# Patient Record
Sex: MALE | Race: WHITE | NOT HISPANIC OR LATINO | URBAN - METROPOLITAN AREA
[De-identification: names, ages, dates, MRNs, and addresses within clinical notes are randomized per-mention and may not be internally consistent; named-entity substitution may affect disease eponyms.]

---

## 2017-09-06 ENCOUNTER — INPATIENT (INPATIENT)
Facility: HOSPITAL | Age: 37
LOS: 4 days | Discharge: HOME | DRG: 439 | End: 2017-09-11
Attending: INTERNAL MEDICINE | Admitting: INTERNAL MEDICINE
Payer: COMMERCIAL

## 2017-09-06 VITALS
WEIGHT: 166.01 LBS | RESPIRATION RATE: 18 BRPM | DIASTOLIC BLOOD PRESSURE: 110 MMHG | HEART RATE: 91 BPM | TEMPERATURE: 98 F | OXYGEN SATURATION: 98 % | SYSTOLIC BLOOD PRESSURE: 162 MMHG

## 2017-09-06 LAB
ALBUMIN SERPL ELPH-MCNC: 5.4 G/DL — HIGH (ref 3.3–5)
ALP SERPL-CCNC: 47 U/L — SIGNIFICANT CHANGE UP (ref 40–120)
ALT FLD-CCNC: 146 U/L RC — HIGH (ref 10–45)
AMYLASE P1 CFR SERPL: 85 U/L — SIGNIFICANT CHANGE UP (ref 25–125)
ANION GAP SERPL CALC-SCNC: 19 MMOL/L — HIGH (ref 5–17)
AST SERPL-CCNC: 94 U/L — HIGH (ref 10–40)
BASOPHILS # BLD AUTO: 0.1 K/UL — SIGNIFICANT CHANGE UP (ref 0–0.2)
BASOPHILS NFR BLD AUTO: 1.3 % — SIGNIFICANT CHANGE UP (ref 0–2)
BILIRUB SERPL-MCNC: 1.5 MG/DL — HIGH (ref 0.2–1.2)
BUN SERPL-MCNC: 18 MG/DL — SIGNIFICANT CHANGE UP (ref 7–23)
CALCIUM SERPL-MCNC: 10.7 MG/DL — HIGH (ref 8.4–10.5)
CHLORIDE SERPL-SCNC: 96 MMOL/L — SIGNIFICANT CHANGE UP (ref 96–108)
CO2 SERPL-SCNC: 27 MMOL/L — SIGNIFICANT CHANGE UP (ref 22–31)
CREAT SERPL-MCNC: 0.79 MG/DL — SIGNIFICANT CHANGE UP (ref 0.5–1.3)
EOSINOPHIL # BLD AUTO: 0.1 K/UL — SIGNIFICANT CHANGE UP (ref 0–0.5)
EOSINOPHIL NFR BLD AUTO: 0.9 % — SIGNIFICANT CHANGE UP (ref 0–6)
ETHANOL SERPL-MCNC: SIGNIFICANT CHANGE UP MG/DL (ref 0–10)
GLUCOSE SERPL-MCNC: 90 MG/DL — SIGNIFICANT CHANGE UP (ref 70–99)
HCT VFR BLD CALC: 52.6 % — HIGH (ref 39–50)
HGB BLD-MCNC: 18.7 G/DL — HIGH (ref 13–17)
INR BLD: 1.12 RATIO — SIGNIFICANT CHANGE UP (ref 0.88–1.16)
LIDOCAIN IGE QN: 197 U/L — HIGH (ref 7–60)
LYMPHOCYTES # BLD AUTO: 1.2 K/UL — SIGNIFICANT CHANGE UP (ref 1–3.3)
LYMPHOCYTES # BLD AUTO: 18.3 % — SIGNIFICANT CHANGE UP (ref 13–44)
MCHC RBC-ENTMCNC: 33.4 PG — SIGNIFICANT CHANGE UP (ref 27–34)
MCHC RBC-ENTMCNC: 35.5 GM/DL — SIGNIFICANT CHANGE UP (ref 32–36)
MCV RBC AUTO: 94.2 FL — SIGNIFICANT CHANGE UP (ref 80–100)
MONOCYTES # BLD AUTO: 0.6 K/UL — SIGNIFICANT CHANGE UP (ref 0–0.9)
MONOCYTES NFR BLD AUTO: 8.9 % — SIGNIFICANT CHANGE UP (ref 2–14)
NEUTROPHILS # BLD AUTO: 4.6 K/UL — SIGNIFICANT CHANGE UP (ref 1.8–7.4)
NEUTROPHILS NFR BLD AUTO: 70.6 % — SIGNIFICANT CHANGE UP (ref 43–77)
PCP SPEC-MCNC: SIGNIFICANT CHANGE UP
PLATELET # BLD AUTO: 189 K/UL — SIGNIFICANT CHANGE UP (ref 150–400)
POTASSIUM SERPL-MCNC: 4.3 MMOL/L — SIGNIFICANT CHANGE UP (ref 3.5–5.3)
POTASSIUM SERPL-SCNC: 4.3 MMOL/L — SIGNIFICANT CHANGE UP (ref 3.5–5.3)
PROT SERPL-MCNC: 8.5 G/DL — HIGH (ref 6–8.3)
PROTHROM AB SERPL-ACNC: 12.2 SEC — SIGNIFICANT CHANGE UP (ref 9.8–12.7)
RBC # BLD: 5.58 M/UL — SIGNIFICANT CHANGE UP (ref 4.2–5.8)
RBC # FLD: 11.3 % — SIGNIFICANT CHANGE UP (ref 10.3–14.5)
SODIUM SERPL-SCNC: 142 MMOL/L — SIGNIFICANT CHANGE UP (ref 135–145)
WBC # BLD: 6.5 K/UL — SIGNIFICANT CHANGE UP (ref 3.8–10.5)
WBC # FLD AUTO: 6.5 K/UL — SIGNIFICANT CHANGE UP (ref 3.8–10.5)

## 2017-09-06 PROCEDURE — 99285 EMERGENCY DEPT VISIT HI MDM: CPT

## 2017-09-06 RX ORDER — HYDROMORPHONE HYDROCHLORIDE 2 MG/ML
2 INJECTION INTRAMUSCULAR; INTRAVENOUS; SUBCUTANEOUS ONCE
Qty: 0 | Refills: 0 | Status: DISCONTINUED | OUTPATIENT
Start: 2017-09-06 | End: 2017-09-07

## 2017-09-06 RX ORDER — SODIUM CHLORIDE 9 MG/ML
1000 INJECTION, SOLUTION INTRAVENOUS ONCE
Qty: 0 | Refills: 0 | Status: COMPLETED | OUTPATIENT
Start: 2017-09-06 | End: 2017-09-06

## 2017-09-06 RX ORDER — HYDROMORPHONE HYDROCHLORIDE 2 MG/ML
2 INJECTION INTRAMUSCULAR; INTRAVENOUS; SUBCUTANEOUS EVERY 4 HOURS
Qty: 0 | Refills: 0 | Status: DISCONTINUED | OUTPATIENT
Start: 2017-09-06 | End: 2017-09-06

## 2017-09-06 RX ORDER — ONDANSETRON 8 MG/1
4 TABLET, FILM COATED ORAL EVERY 4 HOURS
Qty: 0 | Refills: 0 | Status: DISCONTINUED | OUTPATIENT
Start: 2017-09-06 | End: 2017-09-11

## 2017-09-06 RX ORDER — SODIUM CHLORIDE 9 MG/ML
1000 INJECTION, SOLUTION INTRAVENOUS
Qty: 0 | Refills: 0 | Status: DISCONTINUED | OUTPATIENT
Start: 2017-09-06 | End: 2017-09-11

## 2017-09-06 RX ORDER — HYDROMORPHONE HYDROCHLORIDE 2 MG/ML
1 INJECTION INTRAMUSCULAR; INTRAVENOUS; SUBCUTANEOUS ONCE
Qty: 0 | Refills: 0 | Status: DISCONTINUED | OUTPATIENT
Start: 2017-09-06 | End: 2017-09-06

## 2017-09-06 RX ADMIN — HYDROMORPHONE HYDROCHLORIDE 2 MILLIGRAM(S): 2 INJECTION INTRAMUSCULAR; INTRAVENOUS; SUBCUTANEOUS at 23:25

## 2017-09-06 RX ADMIN — ONDANSETRON 4 MILLIGRAM(S): 8 TABLET, FILM COATED ORAL at 23:25

## 2017-09-06 RX ADMIN — SODIUM CHLORIDE 2000 MILLILITER(S): 9 INJECTION, SOLUTION INTRAVENOUS at 23:25

## 2017-09-06 RX ADMIN — SODIUM CHLORIDE 2000 MILLILITER(S): 9 INJECTION, SOLUTION INTRAVENOUS at 23:38

## 2017-09-06 NOTE — ED PROVIDER NOTE - ABDOMINAL EXAM
Was the patient seen in the last year in this department? Yes     Does patient have an active prescription for medications requested? No     Received Request Via: Pharmacy       tender.../soft

## 2017-09-06 NOTE — ED ADULT NURSE NOTE - OBJECTIVE STATEMENT
36 y/o M presents to the ED c/o abdominal pain.  Patient states he has a hx of pancreatitis, and was hospitalized 2 times in May for pancreatitis.  Pt states he is having pain that is the same from May, pt describes it at diffuses abdominal pain that feels like "pressure".  Pt states he drinks "at least a pint of gin per day".  Pt states he was cutting back a little, but since he had off all weekend he drank a lot and that's why he is having the pain again.  Pt also has hx HTN, but has never followed up and takes no medication daily.  Pt endorses + nausea, but denies vomiting.  Pt also mentions he has a lack of appetite.  Patient is A&Ox4. Face is symmetrical. PERRL 3mmB. Speech is clear. Patient is moving all extremities with 5/5 strength and walks with steady gait.

## 2017-09-06 NOTE — ED PROVIDER NOTE - PROGRESS NOTE DETAILS
Chica CORONA: Patient signed out to me by Dr. Hyman. Patient is a 38 yo M with history of etoh pancreatitis p/w similar epigastric pain radiating to the back. Last episode was May 2017 at an outside hospital. + nausea and 1 episode of vomiting yesterday. No fevers. No signs of withdrawal. Last drink was 36 hours ago. Exam significant for ttp in epigastric area. Patient is pending labs/ CT abd/ pelvis.

## 2017-09-06 NOTE — ED ADULT NURSE NOTE - ED STAT RN HANDOFF DETAILS
Report given to LEYDI Summers, Patient is A&Ox4. Face is symmetrical. PERRL 3mmB. Speech is clear. Patient is moving all extremities with 5/5 strength and walks with steady gait.  Pt denies pain.  Patient awaiting med surg bed.

## 2017-09-06 NOTE — ED PROVIDER NOTE - OBJECTIVE STATEMENT
Dr. Hyman Note: 37M with acute ab pain and nausea that feels like his previous pancreatitis, worse with time, better with nothing, last drink 36hrs ago, no feelings for withdrawal.  2 prior episodes like this.  No fever.  Radiating pain to back.

## 2017-09-06 NOTE — ED PROVIDER NOTE - MEDICAL DECISION MAKING DETAILS
Dr. Hyman Note: concern for alcoholic pancreatitis 36 yo M w hx of alcoholism presenting with abdominal pain w radiation to back concerning for alcoholic pancreatitis  will check cbc, cmp, lipase, amylase, utox, start LR bolus, CT A/P   Dr. Hyman Note: concern for alcoholic pancreatitis

## 2017-09-07 DIAGNOSIS — Z98.890 OTHER SPECIFIED POSTPROCEDURAL STATES: Chronic | ICD-10-CM

## 2017-09-07 DIAGNOSIS — I10 ESSENTIAL (PRIMARY) HYPERTENSION: ICD-10-CM

## 2017-09-07 DIAGNOSIS — K85.20 ALCOHOL INDUCED ACUTE PANCREATITIS WITHOUT NECROSIS OR INFECTION: ICD-10-CM

## 2017-09-07 DIAGNOSIS — F10.10 ALCOHOL ABUSE, UNCOMPLICATED: ICD-10-CM

## 2017-09-07 DIAGNOSIS — Z87.09 PERSONAL HISTORY OF OTHER DISEASES OF THE RESPIRATORY SYSTEM: Chronic | ICD-10-CM

## 2017-09-07 LAB
ALBUMIN SERPL ELPH-MCNC: 4.5 G/DL — SIGNIFICANT CHANGE UP (ref 3.3–5)
ALBUMIN SERPL ELPH-MCNC: 4.7 G/DL — SIGNIFICANT CHANGE UP (ref 3.3–5)
ALP SERPL-CCNC: 38 U/L — LOW (ref 40–120)
ALP SERPL-CCNC: 40 U/L — SIGNIFICANT CHANGE UP (ref 40–120)
ALT FLD-CCNC: 115 U/L — HIGH (ref 10–45)
ALT FLD-CCNC: 120 U/L RC — HIGH (ref 10–45)
ANION GAP SERPL CALC-SCNC: 15 MMOL/L — SIGNIFICANT CHANGE UP (ref 5–17)
ANION GAP SERPL CALC-SCNC: 19 MMOL/L — HIGH (ref 5–17)
APAP SERPL-MCNC: <15 UG/ML — SIGNIFICANT CHANGE UP (ref 10–30)
APTT BLD: 34.7 SEC — SIGNIFICANT CHANGE UP (ref 27.5–37.4)
AST SERPL-CCNC: 70 U/L — HIGH (ref 10–40)
AST SERPL-CCNC: 70 U/L — HIGH (ref 10–40)
BILIRUB DIRECT SERPL-MCNC: 0.4 MG/DL — HIGH (ref 0–0.2)
BILIRUB INDIRECT FLD-MCNC: 0.7 MG/DL — SIGNIFICANT CHANGE UP (ref 0.2–1)
BILIRUB SERPL-MCNC: 1.1 MG/DL — SIGNIFICANT CHANGE UP (ref 0.2–1.2)
BILIRUB SERPL-MCNC: 1.1 MG/DL — SIGNIFICANT CHANGE UP (ref 0.2–1.2)
BUN SERPL-MCNC: 13 MG/DL — SIGNIFICANT CHANGE UP (ref 7–23)
BUN SERPL-MCNC: 14 MG/DL — SIGNIFICANT CHANGE UP (ref 7–23)
CALCIUM SERPL-MCNC: 9.7 MG/DL — SIGNIFICANT CHANGE UP (ref 8.4–10.5)
CALCIUM SERPL-MCNC: 9.8 MG/DL — SIGNIFICANT CHANGE UP (ref 8.4–10.5)
CHLORIDE SERPL-SCNC: 95 MMOL/L — LOW (ref 96–108)
CHLORIDE SERPL-SCNC: 98 MMOL/L — SIGNIFICANT CHANGE UP (ref 96–108)
CO2 SERPL-SCNC: 24 MMOL/L — SIGNIFICANT CHANGE UP (ref 22–31)
CO2 SERPL-SCNC: 26 MMOL/L — SIGNIFICANT CHANGE UP (ref 22–31)
CREAT SERPL-MCNC: 0.64 MG/DL — SIGNIFICANT CHANGE UP (ref 0.5–1.3)
CREAT SERPL-MCNC: 0.69 MG/DL — SIGNIFICANT CHANGE UP (ref 0.5–1.3)
GLUCOSE SERPL-MCNC: 94 MG/DL — SIGNIFICANT CHANGE UP (ref 70–99)
GLUCOSE SERPL-MCNC: 97 MG/DL — SIGNIFICANT CHANGE UP (ref 70–99)
HCT VFR BLD CALC: 44 % — SIGNIFICANT CHANGE UP (ref 39–50)
HGB BLD-MCNC: 15.7 G/DL — SIGNIFICANT CHANGE UP (ref 13–17)
LIDOCAIN IGE QN: 246 U/L — HIGH (ref 7–60)
MAGNESIUM SERPL-MCNC: 1.5 MG/DL — LOW (ref 1.6–2.6)
MCHC RBC-ENTMCNC: 31.2 PG — SIGNIFICANT CHANGE UP (ref 27–34)
MCHC RBC-ENTMCNC: 35.7 GM/DL — SIGNIFICANT CHANGE UP (ref 32–36)
MCV RBC AUTO: 87.5 FL — SIGNIFICANT CHANGE UP (ref 80–100)
PHOSPHATE SERPL-MCNC: 3.1 MG/DL — SIGNIFICANT CHANGE UP (ref 2.5–4.5)
PLATELET # BLD AUTO: 160 K/UL — SIGNIFICANT CHANGE UP (ref 150–400)
POTASSIUM SERPL-MCNC: 3.9 MMOL/L — SIGNIFICANT CHANGE UP (ref 3.5–5.3)
POTASSIUM SERPL-MCNC: 4 MMOL/L — SIGNIFICANT CHANGE UP (ref 3.5–5.3)
POTASSIUM SERPL-SCNC: 3.9 MMOL/L — SIGNIFICANT CHANGE UP (ref 3.5–5.3)
POTASSIUM SERPL-SCNC: 4 MMOL/L — SIGNIFICANT CHANGE UP (ref 3.5–5.3)
PROT SERPL-MCNC: 7.1 G/DL — SIGNIFICANT CHANGE UP (ref 6–8.3)
PROT SERPL-MCNC: 7.3 G/DL — SIGNIFICANT CHANGE UP (ref 6–8.3)
RBC # BLD: 5.03 M/UL — SIGNIFICANT CHANGE UP (ref 4.2–5.8)
RBC # FLD: 12.2 % — SIGNIFICANT CHANGE UP (ref 10.3–14.5)
SODIUM SERPL-SCNC: 138 MMOL/L — SIGNIFICANT CHANGE UP (ref 135–145)
SODIUM SERPL-SCNC: 139 MMOL/L — SIGNIFICANT CHANGE UP (ref 135–145)
WBC # BLD: 5.78 K/UL — SIGNIFICANT CHANGE UP (ref 3.8–10.5)
WBC # FLD AUTO: 5.78 K/UL — SIGNIFICANT CHANGE UP (ref 3.8–10.5)

## 2017-09-07 PROCEDURE — 99223 1ST HOSP IP/OBS HIGH 75: CPT

## 2017-09-07 PROCEDURE — 76705 ECHO EXAM OF ABDOMEN: CPT | Mod: 26,RT

## 2017-09-07 PROCEDURE — 74177 CT ABD & PELVIS W/CONTRAST: CPT | Mod: 26

## 2017-09-07 RX ORDER — METOPROLOL TARTRATE 50 MG
25 TABLET ORAL
Qty: 0 | Refills: 0 | Status: DISCONTINUED | OUTPATIENT
Start: 2017-09-07 | End: 2017-09-08

## 2017-09-07 RX ORDER — HYDROMORPHONE HYDROCHLORIDE 2 MG/ML
1 INJECTION INTRAMUSCULAR; INTRAVENOUS; SUBCUTANEOUS
Qty: 0 | Refills: 0 | Status: DISCONTINUED | OUTPATIENT
Start: 2017-09-07 | End: 2017-09-07

## 2017-09-07 RX ORDER — ONDANSETRON 8 MG/1
4 TABLET, FILM COATED ORAL ONCE
Qty: 0 | Refills: 0 | Status: COMPLETED | OUTPATIENT
Start: 2017-09-07 | End: 2017-09-07

## 2017-09-07 RX ORDER — HYDROMORPHONE HYDROCHLORIDE 2 MG/ML
1 INJECTION INTRAMUSCULAR; INTRAVENOUS; SUBCUTANEOUS ONCE
Qty: 0 | Refills: 0 | Status: DISCONTINUED | OUTPATIENT
Start: 2017-09-07 | End: 2017-09-07

## 2017-09-07 RX ORDER — METOPROLOL TARTRATE 50 MG
1 TABLET ORAL
Qty: 0 | Refills: 0 | COMMUNITY

## 2017-09-07 RX ORDER — HYDRALAZINE HCL 50 MG
10 TABLET ORAL ONCE
Qty: 0 | Refills: 0 | Status: COMPLETED | OUTPATIENT
Start: 2017-09-07 | End: 2017-09-07

## 2017-09-07 RX ORDER — MAGNESIUM SULFATE 500 MG/ML
2 VIAL (ML) INJECTION ONCE
Qty: 0 | Refills: 0 | Status: COMPLETED | OUTPATIENT
Start: 2017-09-07 | End: 2017-09-07

## 2017-09-07 RX ORDER — THIAMINE MONONITRATE (VIT B1) 100 MG
100 TABLET ORAL DAILY
Qty: 0 | Refills: 0 | Status: COMPLETED | OUTPATIENT
Start: 2017-09-07 | End: 2017-09-09

## 2017-09-07 RX ORDER — HYDROMORPHONE HYDROCHLORIDE 2 MG/ML
2 INJECTION INTRAMUSCULAR; INTRAVENOUS; SUBCUTANEOUS EVERY 4 HOURS
Qty: 0 | Refills: 0 | Status: DISCONTINUED | OUTPATIENT
Start: 2017-09-07 | End: 2017-09-08

## 2017-09-07 RX ORDER — HYDROMORPHONE HYDROCHLORIDE 2 MG/ML
0.5 INJECTION INTRAMUSCULAR; INTRAVENOUS; SUBCUTANEOUS ONCE
Qty: 0 | Refills: 0 | Status: DISCONTINUED | OUTPATIENT
Start: 2017-09-07 | End: 2017-09-07

## 2017-09-07 RX ORDER — FOLIC ACID 0.8 MG
1 TABLET ORAL DAILY
Qty: 0 | Refills: 0 | Status: DISCONTINUED | OUTPATIENT
Start: 2017-09-07 | End: 2017-09-11

## 2017-09-07 RX ORDER — SODIUM CHLORIDE 9 MG/ML
1000 INJECTION, SOLUTION INTRAVENOUS ONCE
Qty: 0 | Refills: 0 | Status: COMPLETED | OUTPATIENT
Start: 2017-09-07 | End: 2017-09-07

## 2017-09-07 RX ADMIN — ONDANSETRON 4 MILLIGRAM(S): 8 TABLET, FILM COATED ORAL at 07:49

## 2017-09-07 RX ADMIN — Medication 2 MILLIGRAM(S): at 10:44

## 2017-09-07 RX ADMIN — Medication 100 MILLIGRAM(S): at 14:14

## 2017-09-07 RX ADMIN — HYDROMORPHONE HYDROCHLORIDE 2 MILLIGRAM(S): 2 INJECTION INTRAMUSCULAR; INTRAVENOUS; SUBCUTANEOUS at 21:25

## 2017-09-07 RX ADMIN — ONDANSETRON 4 MILLIGRAM(S): 8 TABLET, FILM COATED ORAL at 03:26

## 2017-09-07 RX ADMIN — HYDROMORPHONE HYDROCHLORIDE 1 MILLIGRAM(S): 2 INJECTION INTRAMUSCULAR; INTRAVENOUS; SUBCUTANEOUS at 14:44

## 2017-09-07 RX ADMIN — Medication 25 MILLIGRAM(S): at 14:16

## 2017-09-07 RX ADMIN — Medication 10 MILLIGRAM(S): at 02:02

## 2017-09-07 RX ADMIN — Medication 2 MILLIGRAM(S): at 11:39

## 2017-09-07 RX ADMIN — Medication 2 MILLIGRAM(S): at 14:14

## 2017-09-07 RX ADMIN — HYDROMORPHONE HYDROCHLORIDE 1 MILLIGRAM(S): 2 INJECTION INTRAMUSCULAR; INTRAVENOUS; SUBCUTANEOUS at 03:56

## 2017-09-07 RX ADMIN — Medication 2 MILLIGRAM(S): at 21:20

## 2017-09-07 RX ADMIN — HYDROMORPHONE HYDROCHLORIDE 1 MILLIGRAM(S): 2 INJECTION INTRAMUSCULAR; INTRAVENOUS; SUBCUTANEOUS at 14:14

## 2017-09-07 RX ADMIN — HYDROMORPHONE HYDROCHLORIDE 1 MILLIGRAM(S): 2 INJECTION INTRAMUSCULAR; INTRAVENOUS; SUBCUTANEOUS at 10:49

## 2017-09-07 RX ADMIN — Medication 25 MILLIGRAM(S): at 21:20

## 2017-09-07 RX ADMIN — Medication 10 MILLIGRAM(S): at 06:16

## 2017-09-07 RX ADMIN — HYDROMORPHONE HYDROCHLORIDE 2 MILLIGRAM(S): 2 INJECTION INTRAMUSCULAR; INTRAVENOUS; SUBCUTANEOUS at 00:00

## 2017-09-07 RX ADMIN — ONDANSETRON 4 MILLIGRAM(S): 8 TABLET, FILM COATED ORAL at 21:26

## 2017-09-07 RX ADMIN — Medication 1 TABLET(S): at 11:42

## 2017-09-07 RX ADMIN — SODIUM CHLORIDE 200 MILLILITER(S): 9 INJECTION, SOLUTION INTRAVENOUS at 06:15

## 2017-09-07 RX ADMIN — HYDROMORPHONE HYDROCHLORIDE 2 MILLIGRAM(S): 2 INJECTION INTRAMUSCULAR; INTRAVENOUS; SUBCUTANEOUS at 21:55

## 2017-09-07 RX ADMIN — HYDROMORPHONE HYDROCHLORIDE 1 MILLIGRAM(S): 2 INJECTION INTRAMUSCULAR; INTRAVENOUS; SUBCUTANEOUS at 03:26

## 2017-09-07 RX ADMIN — SODIUM CHLORIDE 2000 MILLILITER(S): 9 INJECTION, SOLUTION INTRAVENOUS at 01:59

## 2017-09-07 RX ADMIN — SODIUM CHLORIDE 200 MILLILITER(S): 9 INJECTION, SOLUTION INTRAVENOUS at 23:34

## 2017-09-07 RX ADMIN — Medication 1 MILLIGRAM(S): at 11:42

## 2017-09-07 RX ADMIN — HYDROMORPHONE HYDROCHLORIDE 0.5 MILLIGRAM(S): 2 INJECTION INTRAMUSCULAR; INTRAVENOUS; SUBCUTANEOUS at 07:49

## 2017-09-07 RX ADMIN — Medication 2 MILLIGRAM(S): at 10:42

## 2017-09-07 RX ADMIN — Medication 2 MILLIGRAM(S): at 17:19

## 2017-09-07 RX ADMIN — HYDROMORPHONE HYDROCHLORIDE 0.5 MILLIGRAM(S): 2 INJECTION INTRAMUSCULAR; INTRAVENOUS; SUBCUTANEOUS at 09:01

## 2017-09-07 RX ADMIN — HYDROMORPHONE HYDROCHLORIDE 1 MILLIGRAM(S): 2 INJECTION INTRAMUSCULAR; INTRAVENOUS; SUBCUTANEOUS at 17:19

## 2017-09-07 RX ADMIN — Medication 50 GRAM(S): at 14:15

## 2017-09-07 NOTE — H&P ADULT - NSHPLABSRESULTS_GEN_ALL_CORE
Labs reviewed: no leukocytosis, labs overall hemoconcentrated on admission, ALP 40, AST 94, , lipase elevated 197, Utox THC+    CT Abdomen personally reviewed: acute pancreatitis

## 2017-09-07 NOTE — H&P ADULT - NSHPSOCIALHISTORY_GEN_ALL_CORE
The patient works as a gym .  Denies smoking tobacco.   Abuses alcohol: Gin, 1 pint, daily  Uses marijuana.

## 2017-09-07 NOTE — H&P ADULT - FAMILY HISTORY
Mother  Still living? Unknown  Family history of hypertension, Age at diagnosis: Age Unknown     Father  Still living? Unknown  Family history of alcoholism, Age at diagnosis: Age Unknown  Family history of lymphoma, Age at diagnosis: Age Unknown

## 2017-09-07 NOTE — H&P ADULT - PROBLEM SELECTOR PLAN 2
CIWA monitoring  Start High dose CIWA  Ativan taper, with breakthrough PRN ativan  Likely explains the mild elevation in LFTs (transaminitis)   Check LFTs in AM.  SW consult
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
## 2017-09-07 NOTE — H&P ADULT - PROBLEM SELECTOR PLAN 3
Was taking metoprolol 25mg PO BID in the recent past, but ran out of the prescription 1-2 months ago. Will resume it and monitor BP.   The BP is elevated on admission partly due to acute abdominal pain, and partly due to alcohol withdrawal.

## 2017-09-08 DIAGNOSIS — R45.1 RESTLESSNESS AND AGITATION: ICD-10-CM

## 2017-09-08 DIAGNOSIS — F10.230 ALCOHOL DEPENDENCE WITH WITHDRAWAL, UNCOMPLICATED: ICD-10-CM

## 2017-09-08 LAB
ALBUMIN SERPL ELPH-MCNC: 4.9 G/DL — SIGNIFICANT CHANGE UP (ref 3.3–5)
ALP SERPL-CCNC: 40 U/L — SIGNIFICANT CHANGE UP (ref 40–120)
ALT FLD-CCNC: 97 U/L RC — HIGH (ref 10–45)
ANION GAP SERPL CALC-SCNC: 16 MMOL/L — SIGNIFICANT CHANGE UP (ref 5–17)
AST SERPL-CCNC: 50 U/L — HIGH (ref 10–40)
BASOPHILS # BLD AUTO: 0 K/UL — SIGNIFICANT CHANGE UP (ref 0–0.2)
BASOPHILS NFR BLD AUTO: 0.2 % — SIGNIFICANT CHANGE UP (ref 0–2)
BILIRUB SERPL-MCNC: 1.8 MG/DL — HIGH (ref 0.2–1.2)
BUN SERPL-MCNC: 12 MG/DL — SIGNIFICANT CHANGE UP (ref 7–23)
CALCIUM SERPL-MCNC: 10.1 MG/DL — SIGNIFICANT CHANGE UP (ref 8.4–10.5)
CHLORIDE SERPL-SCNC: 96 MMOL/L — SIGNIFICANT CHANGE UP (ref 96–108)
CO2 SERPL-SCNC: 24 MMOL/L — SIGNIFICANT CHANGE UP (ref 22–31)
CREAT SERPL-MCNC: 0.66 MG/DL — SIGNIFICANT CHANGE UP (ref 0.5–1.3)
EOSINOPHIL # BLD AUTO: 0.1 K/UL — SIGNIFICANT CHANGE UP (ref 0–0.5)
EOSINOPHIL NFR BLD AUTO: 1 % — SIGNIFICANT CHANGE UP (ref 0–6)
GLUCOSE SERPL-MCNC: 94 MG/DL — SIGNIFICANT CHANGE UP (ref 70–99)
HBA1C BLD-MCNC: 5.2 % — SIGNIFICANT CHANGE UP (ref 4–5.6)
HCT VFR BLD CALC: 45.8 % — SIGNIFICANT CHANGE UP (ref 39–50)
HGB BLD-MCNC: 16.4 G/DL — SIGNIFICANT CHANGE UP (ref 13–17)
HIV 1+2 AB+HIV1 P24 AG SERPL QL IA: SIGNIFICANT CHANGE UP
LYMPHOCYTES # BLD AUTO: 1 K/UL — SIGNIFICANT CHANGE UP (ref 1–3.3)
LYMPHOCYTES # BLD AUTO: 10.6 % — LOW (ref 13–44)
MAGNESIUM SERPL-MCNC: 1.8 MG/DL — SIGNIFICANT CHANGE UP (ref 1.6–2.6)
MCHC RBC-ENTMCNC: 33.4 PG — SIGNIFICANT CHANGE UP (ref 27–34)
MCHC RBC-ENTMCNC: 35.7 GM/DL — SIGNIFICANT CHANGE UP (ref 32–36)
MCV RBC AUTO: 93.6 FL — SIGNIFICANT CHANGE UP (ref 80–100)
MONOCYTES # BLD AUTO: 0.7 K/UL — SIGNIFICANT CHANGE UP (ref 0–0.9)
MONOCYTES NFR BLD AUTO: 7.3 % — SIGNIFICANT CHANGE UP (ref 2–14)
NEUTROPHILS # BLD AUTO: 7.9 K/UL — HIGH (ref 1.8–7.4)
NEUTROPHILS NFR BLD AUTO: 80.9 % — HIGH (ref 43–77)
PHOSPHATE SERPL-MCNC: 3.2 MG/DL — SIGNIFICANT CHANGE UP (ref 2.5–4.5)
PLATELET # BLD AUTO: 166 K/UL — SIGNIFICANT CHANGE UP (ref 150–400)
POTASSIUM SERPL-MCNC: 4.2 MMOL/L — SIGNIFICANT CHANGE UP (ref 3.5–5.3)
POTASSIUM SERPL-SCNC: 4.2 MMOL/L — SIGNIFICANT CHANGE UP (ref 3.5–5.3)
PROT SERPL-MCNC: 7.5 G/DL — SIGNIFICANT CHANGE UP (ref 6–8.3)
RBC # BLD: 4.89 M/UL — SIGNIFICANT CHANGE UP (ref 4.2–5.8)
RBC # FLD: 11.3 % — SIGNIFICANT CHANGE UP (ref 10.3–14.5)
SODIUM SERPL-SCNC: 136 MMOL/L — SIGNIFICANT CHANGE UP (ref 135–145)
TSH SERPL-MCNC: 2.91 UIU/ML — SIGNIFICANT CHANGE UP (ref 0.27–4.2)
WBC # BLD: 9.8 K/UL — SIGNIFICANT CHANGE UP (ref 3.8–10.5)
WBC # FLD AUTO: 9.8 K/UL — SIGNIFICANT CHANGE UP (ref 3.8–10.5)

## 2017-09-08 PROCEDURE — 93010 ELECTROCARDIOGRAM REPORT: CPT

## 2017-09-08 PROCEDURE — 99223 1ST HOSP IP/OBS HIGH 75: CPT

## 2017-09-08 RX ORDER — HYDROMORPHONE HYDROCHLORIDE 2 MG/ML
6 INJECTION INTRAMUSCULAR; INTRAVENOUS; SUBCUTANEOUS
Qty: 0 | Refills: 0 | Status: DISCONTINUED | OUTPATIENT
Start: 2017-09-08 | End: 2017-09-09

## 2017-09-08 RX ORDER — HYDROMORPHONE HYDROCHLORIDE 2 MG/ML
2 INJECTION INTRAMUSCULAR; INTRAVENOUS; SUBCUTANEOUS
Qty: 0 | Refills: 0 | Status: DISCONTINUED | OUTPATIENT
Start: 2017-09-08 | End: 2017-09-09

## 2017-09-08 RX ORDER — METOPROLOL TARTRATE 50 MG
50 TABLET ORAL THREE TIMES A DAY
Qty: 0 | Refills: 0 | Status: DISCONTINUED | OUTPATIENT
Start: 2017-09-08 | End: 2017-09-11

## 2017-09-08 RX ADMIN — HYDROMORPHONE HYDROCHLORIDE 2 MILLIGRAM(S): 2 INJECTION INTRAMUSCULAR; INTRAVENOUS; SUBCUTANEOUS at 06:00

## 2017-09-08 RX ADMIN — Medication 1 MILLIGRAM(S): at 11:20

## 2017-09-08 RX ADMIN — HYDROMORPHONE HYDROCHLORIDE 2 MILLIGRAM(S): 2 INJECTION INTRAMUSCULAR; INTRAVENOUS; SUBCUTANEOUS at 16:37

## 2017-09-08 RX ADMIN — HYDROMORPHONE HYDROCHLORIDE 2 MILLIGRAM(S): 2 INJECTION INTRAMUSCULAR; INTRAVENOUS; SUBCUTANEOUS at 09:57

## 2017-09-08 RX ADMIN — ONDANSETRON 4 MILLIGRAM(S): 8 TABLET, FILM COATED ORAL at 05:26

## 2017-09-08 RX ADMIN — HYDROMORPHONE HYDROCHLORIDE 2 MILLIGRAM(S): 2 INJECTION INTRAMUSCULAR; INTRAVENOUS; SUBCUTANEOUS at 05:25

## 2017-09-08 RX ADMIN — Medication 2 MILLIGRAM(S): at 23:06

## 2017-09-08 RX ADMIN — HYDROMORPHONE HYDROCHLORIDE 2 MILLIGRAM(S): 2 INJECTION INTRAMUSCULAR; INTRAVENOUS; SUBCUTANEOUS at 19:54

## 2017-09-08 RX ADMIN — SODIUM CHLORIDE 200 MILLILITER(S): 9 INJECTION, SOLUTION INTRAVENOUS at 17:31

## 2017-09-08 RX ADMIN — Medication 2 MILLIGRAM(S): at 01:05

## 2017-09-08 RX ADMIN — Medication 50 MILLIGRAM(S): at 16:29

## 2017-09-08 RX ADMIN — Medication 1 MILLIGRAM(S): at 05:04

## 2017-09-08 RX ADMIN — Medication 3 MILLIGRAM(S): at 20:30

## 2017-09-08 RX ADMIN — Medication 100 MILLIGRAM(S): at 11:20

## 2017-09-08 RX ADMIN — HYDROMORPHONE HYDROCHLORIDE 2 MILLIGRAM(S): 2 INJECTION INTRAMUSCULAR; INTRAVENOUS; SUBCUTANEOUS at 01:50

## 2017-09-08 RX ADMIN — Medication 25 MILLIGRAM(S): at 04:58

## 2017-09-08 RX ADMIN — Medication 2 MILLIGRAM(S): at 21:44

## 2017-09-08 RX ADMIN — Medication 3 MILLIGRAM(S): at 17:22

## 2017-09-08 RX ADMIN — HYDROMORPHONE HYDROCHLORIDE 2 MILLIGRAM(S): 2 INJECTION INTRAMUSCULAR; INTRAVENOUS; SUBCUTANEOUS at 02:20

## 2017-09-08 RX ADMIN — Medication 2 MILLIGRAM(S): at 05:25

## 2017-09-08 RX ADMIN — HYDROMORPHONE HYDROCHLORIDE 2 MILLIGRAM(S): 2 INJECTION INTRAMUSCULAR; INTRAVENOUS; SUBCUTANEOUS at 09:26

## 2017-09-08 RX ADMIN — HYDROMORPHONE HYDROCHLORIDE 2 MILLIGRAM(S): 2 INJECTION INTRAMUSCULAR; INTRAVENOUS; SUBCUTANEOUS at 16:18

## 2017-09-08 RX ADMIN — Medication 1.5 MILLIGRAM(S): at 10:07

## 2017-09-08 RX ADMIN — Medication 2 MILLIGRAM(S): at 12:57

## 2017-09-08 RX ADMIN — Medication 1 TABLET(S): at 11:20

## 2017-09-08 NOTE — BEHAVIORAL HEALTH ASSESSMENT NOTE - NSBHCONSULTMEDS_PSY_A_CORE FT
1. Recommend increasing Ativan taper as follows:  Ativan 3mg PO q4h x6 doses, 2mg PO q4h x6 doses, 1mg PO q4h x6 doses, 1mg PO q12h x2 doses, 0.5mg PO q12h x2 dose --> off    2. CIWA, thiamine/MVI/folic acid.  Thiamine 500mg IV tid x9 doses.    3. PRN: Ativan 2mg PO q2h PRN sx-triggered CIWA.  Check EKG; if QTc <500, can use Haldol 2.5mg IV q6h PRN agitation.    4. SW for substance abuse referral resources.  OP psych f/u at [facility]: [phone].    5. At this time, pt has capacity to leave AMA as he understands the risks of both untreated pancreatitis and alcohol withdrawal; however, he is agreeable to stay and present time and no longer requesting to leave. 1. Recommend increasing Ativan taper as follows:  Ativan 3mg PO q4h x6 doses, 2mg PO q4h x6 doses, 1mg PO q4h x6 doses, 1mg PO q12h x2 doses, 0.5mg PO q12h x2 dose --> off    2. CIWA, thiamine/MVI/folic acid.  Thiamine 500mg IV tid x9 doses.    3. PRN: Ativan 2mg PO q2h PRN sx-triggered CIWA.  Check EKG; if QTc <500, can use Haldol 2.5mg IV q6h PRN agitation.    4. SW for substance abuse referral resources.  OP psych f/u at [facility]: [phone].    5. At this time, pt has capacity to leave AMA as he understands the risks of both untreated pancreatitis and alcohol withdrawal; however, he is agreeable to stay at present time and no longer requesting to leave.
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
## 2017-09-08 NOTE — BEHAVIORAL HEALTH ASSESSMENT NOTE - NSBHCHARTREVIEWVS_PSY_A_CORE FT
T(C): 36.9 (09-08-17 @ 09:52), Max: 36.9 (09-08-17 @ 09:52)  HR: 94 (09-08-17 @ 09:52) (78 - 94)  BP: 154/105 (09-08-17 @ 09:52) (146/94 - 170/100)  RR: 18 (09-08-17 @ 09:52) (16 - 18)  SpO2: 95% (09-08-17 @ 09:52) (95% - 98%)  Wt(kg): --    CIWA 8
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
## 2017-09-08 NOTE — BEHAVIORAL HEALTH ASSESSMENT NOTE - NSBHSUICPROTECTFACT_PSY_A_CORE
Fear of death or dying due to pain/suffering/Identifies reasons for living/Engaged in work or school/Future oriented/Supportive social network or family

## 2017-09-08 NOTE — BEHAVIORAL HEALTH ASSESSMENT NOTE - NSBHCHARTREVIEWLAB_PSY_A_CORE FT
16.4   9.8   )-----------( 166      ( 08 Sep 2017 11:20 )             45.8     09-08    136  |  96  |  12  ----------------------------<  94  4.2   |  24  |  0.66    Ca    10.1      08 Sep 2017 11:20  Phos  3.2     09-08  Mg     1.8     09-08    TPro  7.5  /  Alb  4.9  /  TBili  1.8<H>  /  DBili  x   /  AST  50<H>  /  ALT  97<H>  /  AlkPhos  40  09-08
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
## 2017-09-08 NOTE — PROVIDER CONTACT NOTE (OTHER) - BACKGROUND
PT. dx Pancreatitis, on CIWA scale.
PT. admitted with Pancreatitis. Pt. on CIWA scale.
Pt. +Pancreatitis + CIWA protocol.

## 2017-09-09 DIAGNOSIS — K85.20 ALCOHOL INDUCED ACUTE PANCREATITIS WITHOUT NECROSIS OR INFECTION: ICD-10-CM

## 2017-09-09 LAB
ALBUMIN SERPL ELPH-MCNC: 4.5 G/DL — SIGNIFICANT CHANGE UP (ref 3.3–5)
ALP SERPL-CCNC: 38 U/L — LOW (ref 40–120)
ALT FLD-CCNC: 68 U/L — HIGH (ref 10–45)
ANION GAP SERPL CALC-SCNC: 17 MMOL/L — SIGNIFICANT CHANGE UP (ref 5–17)
ANION GAP SERPL CALC-SCNC: 19 MMOL/L — HIGH (ref 5–17)
AST SERPL-CCNC: 38 U/L — SIGNIFICANT CHANGE UP (ref 10–40)
BASOPHILS # BLD AUTO: 0.01 K/UL — SIGNIFICANT CHANGE UP (ref 0–0.2)
BASOPHILS NFR BLD AUTO: 0.1 % — SIGNIFICANT CHANGE UP (ref 0–2)
BILIRUB SERPL-MCNC: 1.6 MG/DL — HIGH (ref 0.2–1.2)
BUN SERPL-MCNC: 12 MG/DL — SIGNIFICANT CHANGE UP (ref 7–23)
BUN SERPL-MCNC: 14 MG/DL — SIGNIFICANT CHANGE UP (ref 7–23)
CALCIUM SERPL-MCNC: 10.1 MG/DL — SIGNIFICANT CHANGE UP (ref 8.4–10.5)
CALCIUM SERPL-MCNC: 9.9 MG/DL — SIGNIFICANT CHANGE UP (ref 8.4–10.5)
CHLORIDE SERPL-SCNC: 96 MMOL/L — SIGNIFICANT CHANGE UP (ref 96–108)
CHLORIDE SERPL-SCNC: 97 MMOL/L — SIGNIFICANT CHANGE UP (ref 96–108)
CO2 SERPL-SCNC: 23 MMOL/L — SIGNIFICANT CHANGE UP (ref 22–31)
CO2 SERPL-SCNC: 23 MMOL/L — SIGNIFICANT CHANGE UP (ref 22–31)
CREAT SERPL-MCNC: 0.76 MG/DL — SIGNIFICANT CHANGE UP (ref 0.5–1.3)
CREAT SERPL-MCNC: 0.77 MG/DL — SIGNIFICANT CHANGE UP (ref 0.5–1.3)
EOSINOPHIL # BLD AUTO: 0.17 K/UL — SIGNIFICANT CHANGE UP (ref 0–0.5)
EOSINOPHIL NFR BLD AUTO: 2.1 % — SIGNIFICANT CHANGE UP (ref 0–6)
GLUCOSE SERPL-MCNC: 68 MG/DL — LOW (ref 70–99)
GLUCOSE SERPL-MCNC: 78 MG/DL — SIGNIFICANT CHANGE UP (ref 70–99)
HCT VFR BLD CALC: 41.3 % — SIGNIFICANT CHANGE UP (ref 39–50)
HGB BLD-MCNC: 14.7 G/DL — SIGNIFICANT CHANGE UP (ref 13–17)
IMM GRANULOCYTES NFR BLD AUTO: 0.2 % — SIGNIFICANT CHANGE UP (ref 0–1.5)
LIDOCAIN IGE QN: 195 U/L — HIGH (ref 7–60)
LYMPHOCYTES # BLD AUTO: 1.31 K/UL — SIGNIFICANT CHANGE UP (ref 1–3.3)
LYMPHOCYTES # BLD AUTO: 15.9 % — SIGNIFICANT CHANGE UP (ref 13–44)
MAGNESIUM SERPL-MCNC: 1.7 MG/DL — SIGNIFICANT CHANGE UP (ref 1.6–2.6)
MCHC RBC-ENTMCNC: 32.2 PG — SIGNIFICANT CHANGE UP (ref 27–34)
MCHC RBC-ENTMCNC: 35.6 GM/DL — SIGNIFICANT CHANGE UP (ref 32–36)
MCV RBC AUTO: 90.6 FL — SIGNIFICANT CHANGE UP (ref 80–100)
MONOCYTES # BLD AUTO: 0.73 K/UL — SIGNIFICANT CHANGE UP (ref 0–0.9)
MONOCYTES NFR BLD AUTO: 8.9 % — SIGNIFICANT CHANGE UP (ref 2–14)
NEUTROPHILS # BLD AUTO: 5.98 K/UL — SIGNIFICANT CHANGE UP (ref 1.8–7.4)
NEUTROPHILS NFR BLD AUTO: 72.8 % — SIGNIFICANT CHANGE UP (ref 43–77)
PHOSPHATE SERPL-MCNC: 3.4 MG/DL — SIGNIFICANT CHANGE UP (ref 2.5–4.5)
PLATELET # BLD AUTO: 147 K/UL — LOW (ref 150–400)
POTASSIUM SERPL-MCNC: 4 MMOL/L — SIGNIFICANT CHANGE UP (ref 3.5–5.3)
POTASSIUM SERPL-MCNC: 4.3 MMOL/L — SIGNIFICANT CHANGE UP (ref 3.5–5.3)
POTASSIUM SERPL-SCNC: 4 MMOL/L — SIGNIFICANT CHANGE UP (ref 3.5–5.3)
POTASSIUM SERPL-SCNC: 4.3 MMOL/L — SIGNIFICANT CHANGE UP (ref 3.5–5.3)
PROT SERPL-MCNC: 7.2 G/DL — SIGNIFICANT CHANGE UP (ref 6–8.3)
RBC # BLD: 4.56 M/UL — SIGNIFICANT CHANGE UP (ref 4.2–5.8)
RBC # FLD: 12.4 % — SIGNIFICANT CHANGE UP (ref 10.3–14.5)
SODIUM SERPL-SCNC: 137 MMOL/L — SIGNIFICANT CHANGE UP (ref 135–145)
SODIUM SERPL-SCNC: 138 MMOL/L — SIGNIFICANT CHANGE UP (ref 135–145)
WBC # BLD: 8.22 K/UL — SIGNIFICANT CHANGE UP (ref 3.8–10.5)
WBC # FLD AUTO: 8.22 K/UL — SIGNIFICANT CHANGE UP (ref 3.8–10.5)

## 2017-09-09 PROCEDURE — 99233 SBSQ HOSP IP/OBS HIGH 50: CPT

## 2017-09-09 RX ORDER — OLANZAPINE 15 MG/1
5 TABLET, FILM COATED ORAL EVERY 8 HOURS
Qty: 0 | Refills: 0 | Status: DISCONTINUED | OUTPATIENT
Start: 2017-09-09 | End: 2017-09-11

## 2017-09-09 RX ORDER — HYDROMORPHONE HYDROCHLORIDE 2 MG/ML
1 INJECTION INTRAMUSCULAR; INTRAVENOUS; SUBCUTANEOUS EVERY 6 HOURS
Qty: 0 | Refills: 0 | Status: DISCONTINUED | OUTPATIENT
Start: 2017-09-09 | End: 2017-09-11

## 2017-09-09 RX ADMIN — Medication 50 MILLIGRAM(S): at 15:44

## 2017-09-09 RX ADMIN — HYDROMORPHONE HYDROCHLORIDE 2 MILLIGRAM(S): 2 INJECTION INTRAMUSCULAR; INTRAVENOUS; SUBCUTANEOUS at 01:48

## 2017-09-09 RX ADMIN — HYDROMORPHONE HYDROCHLORIDE 2 MILLIGRAM(S): 2 INJECTION INTRAMUSCULAR; INTRAVENOUS; SUBCUTANEOUS at 15:56

## 2017-09-09 RX ADMIN — SODIUM CHLORIDE 200 MILLILITER(S): 9 INJECTION, SOLUTION INTRAVENOUS at 21:37

## 2017-09-09 RX ADMIN — HYDROMORPHONE HYDROCHLORIDE 2 MILLIGRAM(S): 2 INJECTION INTRAMUSCULAR; INTRAVENOUS; SUBCUTANEOUS at 06:35

## 2017-09-09 RX ADMIN — Medication 3 MILLIGRAM(S): at 12:39

## 2017-09-09 RX ADMIN — Medication 100 MILLIGRAM(S): at 12:40

## 2017-09-09 RX ADMIN — HYDROMORPHONE HYDROCHLORIDE 2 MILLIGRAM(S): 2 INJECTION INTRAMUSCULAR; INTRAVENOUS; SUBCUTANEOUS at 09:07

## 2017-09-09 RX ADMIN — HYDROMORPHONE HYDROCHLORIDE 2 MILLIGRAM(S): 2 INJECTION INTRAMUSCULAR; INTRAVENOUS; SUBCUTANEOUS at 06:05

## 2017-09-09 RX ADMIN — HYDROMORPHONE HYDROCHLORIDE 2 MILLIGRAM(S): 2 INJECTION INTRAMUSCULAR; INTRAVENOUS; SUBCUTANEOUS at 20:14

## 2017-09-09 RX ADMIN — Medication 3 MILLIGRAM(S): at 06:00

## 2017-09-09 RX ADMIN — Medication 50 MILLIGRAM(S): at 14:40

## 2017-09-09 RX ADMIN — HYDROMORPHONE HYDROCHLORIDE 2 MILLIGRAM(S): 2 INJECTION INTRAMUSCULAR; INTRAVENOUS; SUBCUTANEOUS at 12:55

## 2017-09-09 RX ADMIN — Medication 3 MILLIGRAM(S): at 14:44

## 2017-09-09 RX ADMIN — Medication 3 MILLIGRAM(S): at 01:38

## 2017-09-09 RX ADMIN — HYDROMORPHONE HYDROCHLORIDE 2 MILLIGRAM(S): 2 INJECTION INTRAMUSCULAR; INTRAVENOUS; SUBCUTANEOUS at 12:39

## 2017-09-09 RX ADMIN — Medication 1 TABLET(S): at 12:40

## 2017-09-09 RX ADMIN — HYDROMORPHONE HYDROCHLORIDE 1 MILLIGRAM(S): 2 INJECTION INTRAMUSCULAR; INTRAVENOUS; SUBCUTANEOUS at 23:56

## 2017-09-09 RX ADMIN — HYDROMORPHONE HYDROCHLORIDE 2 MILLIGRAM(S): 2 INJECTION INTRAMUSCULAR; INTRAVENOUS; SUBCUTANEOUS at 19:44

## 2017-09-09 RX ADMIN — HYDROMORPHONE HYDROCHLORIDE 2 MILLIGRAM(S): 2 INJECTION INTRAMUSCULAR; INTRAVENOUS; SUBCUTANEOUS at 03:05

## 2017-09-09 RX ADMIN — Medication 1 MILLIGRAM(S): at 12:40

## 2017-09-09 RX ADMIN — HYDROMORPHONE HYDROCHLORIDE 2 MILLIGRAM(S): 2 INJECTION INTRAMUSCULAR; INTRAVENOUS; SUBCUTANEOUS at 09:22

## 2017-09-09 RX ADMIN — Medication 50 MILLIGRAM(S): at 22:04

## 2017-09-09 RX ADMIN — ONDANSETRON 4 MILLIGRAM(S): 8 TABLET, FILM COATED ORAL at 06:00

## 2017-09-09 RX ADMIN — Medication 2 MILLIGRAM(S): at 22:05

## 2017-09-09 RX ADMIN — ONDANSETRON 4 MILLIGRAM(S): 8 TABLET, FILM COATED ORAL at 12:45

## 2017-09-09 RX ADMIN — SODIUM CHLORIDE 200 MILLILITER(S): 9 INJECTION, SOLUTION INTRAVENOUS at 12:41

## 2017-09-09 RX ADMIN — HYDROMORPHONE HYDROCHLORIDE 2 MILLIGRAM(S): 2 INJECTION INTRAMUSCULAR; INTRAVENOUS; SUBCUTANEOUS at 16:11

## 2017-09-09 RX ADMIN — HYDROMORPHONE HYDROCHLORIDE 2 MILLIGRAM(S): 2 INJECTION INTRAMUSCULAR; INTRAVENOUS; SUBCUTANEOUS at 03:35

## 2017-09-09 RX ADMIN — HYDROMORPHONE HYDROCHLORIDE 2 MILLIGRAM(S): 2 INJECTION INTRAMUSCULAR; INTRAVENOUS; SUBCUTANEOUS at 00:07

## 2017-09-09 RX ADMIN — SODIUM CHLORIDE 200 MILLILITER(S): 9 INJECTION, SOLUTION INTRAVENOUS at 15:45

## 2017-09-09 RX ADMIN — Medication 2 MILLIGRAM(S): at 15:44
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
## 2017-09-09 NOTE — CHART NOTE - NSCHARTNOTEFT_GEN_A_CORE
Discussed with Dr. Menard pain management orders. Per Dr. Menard discontinued standing dilaudid and PO prn. Started dilaudid 1mg IV q6hrs prn for severe pain. Continue with Ativan taper and zyprexa prn.    Jeri Valdivia NP  [id number]
Notified by RN approx 04:30 AM, pt is in hallway requesting to leave so he can speak to his doctors. Pt seen in hallway, spoke with him about his concerns. Pt is worried about why he is here and wants to go to the ED to speak to Dr. Menard. Explained to patient it is unsafe for him to leave and he is not able to leave at this time without us speaking to his doctor. Security was called as precaution but did not have to intervene. Pt was able to be convinced to stay in his room if he is able to speak to Dr. Choi or Dr. Menard. Called Dr. Menard who spoke to patient and he agreed to stay. Pt Ciwa score increased to 6, due to receive PO Ativan but will give IV due to situation. Pt agreed to receiving IV Ativan 1 mg and continuing IVF. Will have primary team in AM call psych consult to see patient in AM per Dr. Menard for further management and mental capacity. Patient is now calmly sitting in bed. Will continue to monitor patient.    Kody Chen PA-C  Department of Medicine  #[id number]
Notified by RN that patient is pacing the hallway, anxious, unsteady gait, already pulled out abdelrahman x2 during the day.  Patient alert and oriented, NAD, no complaints to offer at this time.   Psych following. Continue CIWA protocol. 1: 1 observation ordered overnight for risk of elopement and fall.  Re-evaluate in am.  Follow up with Attending in AM.    Jeri Valdivia NP  #[id number]

## 2017-09-10 LAB — LIDOCAIN IGE QN: 98 U/L — HIGH (ref 7–60)

## 2017-09-10 PROCEDURE — 99233 SBSQ HOSP IP/OBS HIGH 50: CPT

## 2017-09-10 RX ORDER — POLYETHYLENE GLYCOL 3350 17 G/17G
17 POWDER, FOR SOLUTION ORAL DAILY
Qty: 0 | Refills: 0 | Status: DISCONTINUED | OUTPATIENT
Start: 2017-09-10 | End: 2017-09-11

## 2017-09-10 RX ORDER — SENNA PLUS 8.6 MG/1
2 TABLET ORAL AT BEDTIME
Qty: 0 | Refills: 0 | Status: DISCONTINUED | OUTPATIENT
Start: 2017-09-10 | End: 2017-09-11

## 2017-09-10 RX ORDER — DOCUSATE SODIUM 100 MG
100 CAPSULE ORAL THREE TIMES A DAY
Qty: 0 | Refills: 0 | Status: DISCONTINUED | OUTPATIENT
Start: 2017-09-10 | End: 2017-09-11

## 2017-09-10 RX ADMIN — Medication 50 MILLIGRAM(S): at 21:55

## 2017-09-10 RX ADMIN — HYDROMORPHONE HYDROCHLORIDE 1 MILLIGRAM(S): 2 INJECTION INTRAMUSCULAR; INTRAVENOUS; SUBCUTANEOUS at 06:45

## 2017-09-10 RX ADMIN — Medication 2 MILLIGRAM(S): at 06:47

## 2017-09-10 RX ADMIN — Medication 2 MILLIGRAM(S): at 09:54

## 2017-09-10 RX ADMIN — Medication 1 MILLIGRAM(S): at 12:10

## 2017-09-10 RX ADMIN — HYDROMORPHONE HYDROCHLORIDE 1 MILLIGRAM(S): 2 INJECTION INTRAMUSCULAR; INTRAVENOUS; SUBCUTANEOUS at 18:03

## 2017-09-10 RX ADMIN — Medication 1 MILLIGRAM(S): at 18:03

## 2017-09-10 RX ADMIN — Medication 1 MILLIGRAM(S): at 14:19

## 2017-09-10 RX ADMIN — Medication 1 MILLIGRAM(S): at 21:56

## 2017-09-10 RX ADMIN — Medication 50 MILLIGRAM(S): at 14:20

## 2017-09-10 RX ADMIN — Medication 50 MILLIGRAM(S): at 06:01

## 2017-09-10 RX ADMIN — POLYETHYLENE GLYCOL 3350 17 GRAM(S): 17 POWDER, FOR SOLUTION ORAL at 12:12

## 2017-09-10 RX ADMIN — HYDROMORPHONE HYDROCHLORIDE 1 MILLIGRAM(S): 2 INJECTION INTRAMUSCULAR; INTRAVENOUS; SUBCUTANEOUS at 06:16

## 2017-09-10 RX ADMIN — Medication 2 MILLIGRAM(S): at 02:08

## 2017-09-10 RX ADMIN — HYDROMORPHONE HYDROCHLORIDE 1 MILLIGRAM(S): 2 INJECTION INTRAMUSCULAR; INTRAVENOUS; SUBCUTANEOUS at 00:25

## 2017-09-10 RX ADMIN — SODIUM CHLORIDE 200 MILLILITER(S): 9 INJECTION, SOLUTION INTRAVENOUS at 02:08

## 2017-09-10 RX ADMIN — Medication 1 TABLET(S): at 12:11

## 2017-09-10 RX ADMIN — SODIUM CHLORIDE 100 MILLILITER(S): 9 INJECTION, SOLUTION INTRAVENOUS at 21:59

## 2017-09-10 RX ADMIN — ONDANSETRON 4 MILLIGRAM(S): 8 TABLET, FILM COATED ORAL at 06:09

## 2017-09-10 RX ADMIN — Medication 100 MILLIGRAM(S): at 12:11

## 2017-09-10 RX ADMIN — HYDROMORPHONE HYDROCHLORIDE 1 MILLIGRAM(S): 2 INJECTION INTRAMUSCULAR; INTRAVENOUS; SUBCUTANEOUS at 18:45

## 2017-09-10 RX ADMIN — HYDROMORPHONE HYDROCHLORIDE 1 MILLIGRAM(S): 2 INJECTION INTRAMUSCULAR; INTRAVENOUS; SUBCUTANEOUS at 12:12

## 2017-09-10 RX ADMIN — HYDROMORPHONE HYDROCHLORIDE 1 MILLIGRAM(S): 2 INJECTION INTRAMUSCULAR; INTRAVENOUS; SUBCUTANEOUS at 13:26

## 2017-09-10 NOTE — PROGRESS NOTE ADULT - SUBJECTIVE AND OBJECTIVE BOX
Patient is a 37y old  Male who presents with a chief complaint of abdominal pain, nausea. found to have ETOH induced pancreatitis. Stay complicated by ETOH withdrawal. less agitated today        MEDICATIONS  (STANDING):  lactated ringers. 1000 milliLiter(s) (100 mL/Hr) IV Continuous <Continuous>  folic acid 1 milliGRAM(s) Oral daily  multivitamin 1 Tablet(s) Oral daily  LORazepam     Tablet  milliGRAM(s) Oral   LORazepam     Tablet 1 milliGRAM(s) Oral every 4 hours  metoprolol 50 milliGRAM(s) Oral three times a day  senna 2 Tablet(s) Oral at bedtime  docusate sodium 100 milliGRAM(s) Oral three times a day  polyethylene glycol 3350 17 Gram(s) Oral daily    MEDICATIONS  (PRN):  ondansetron Injectable 4 milliGRAM(s) IV Push every 4 hours PRN Nausea and/or Vomiting  LORazepam   Injectable 2 milliGRAM(s) IV Push every 6 hours PRN Symptom-triggered: each CIWA -Ar score 8 or GREATER  LORazepam     Tablet 2 milliGRAM(s) Oral every 2 hours PRN Symptom-triggered 2 point increase in CIWA-Ar  OLANZapine Injectable 5 milliGRAM(s) IntraMuscular every 8 hours PRN severe agitation  HYDROmorphone  Injectable 1 milliGRAM(s) IV Push every 6 hours PRN Severe Pain (7 - 10)  bisacodyl Suppository 10 milliGRAM(s) Rectal daily PRN Constipation          VITALS:   T(C): 36.7 (09-10-17 @ 20:15), Max: 36.8 (09-10-17 @ 02:02)  HR: 79 (09-10-17 @ 20:15) (69 - 81)  BP: 148/108 (09-10-17 @ 20:15) (144/94 - 172/103)  RR: 18 (09-10-17 @ 20:15) (17 - 18)  SpO2: 98% (09-10-17 @ 20:15) (95% - 98%)  Wt(kg): --        LABS:        CBC Full  -  ( 09 Sep 2017 11:32 )  WBC Count : 8.22 K/uL  Hemoglobin : 14.7 g/dL  Hematocrit : 41.3 %  Platelet Count - Automated : 147 K/uL  Mean Cell Volume : 90.6 fl  Mean Cell Hemoglobin : 32.2 pg  Mean Cell Hemoglobin Concentration : 35.6 gm/dL  Auto Neutrophil # : 5.98 K/uL  Auto Lymphocyte # : 1.31 K/uL  Auto Monocyte # : 0.73 K/uL  Auto Eosinophil # : 0.17 K/uL  Auto Basophil # : 0.01 K/uL  Auto Neutrophil % : 72.8 %  Auto Lymphocyte % : 15.9 %  Auto Monocyte % : 8.9 %  Auto Eosinophil % : 2.1 %  Auto Basophil % : 0.1 %    09-09    138  |  96  |  14  ----------------------------<  68<L>  4.0   |  23  |  0.76    Ca    9.9      09 Sep 2017 18:32  Phos  3.4     09-09  Mg     1.7     09-09    TPro  7.2  /  Alb  4.5  /  TBili  1.6<H>  /  DBili  x   /  AST  38  /  ALT  68<H>  /  AlkPhos  38<L>  09-09    LIVER FUNCTIONS - ( 09 Sep 2017 11:32 )  Alb: 4.5 g/dL / Pro: 7.2 g/dL / ALK PHOS: 38 U/L / ALT: 68 U/L / AST: 38 U/L / GGT: x               CAPILLARY BLOOD GLUCOSE          RADIOLOGY & ADDITIONAL TESTS:

## 2017-09-11 ENCOUNTER — TRANSCRIPTION ENCOUNTER (OUTPATIENT)
Age: 37
End: 2017-09-11

## 2017-09-11 VITALS
TEMPERATURE: 98 F | OXYGEN SATURATION: 95 % | RESPIRATION RATE: 20 BRPM | DIASTOLIC BLOOD PRESSURE: 90 MMHG | SYSTOLIC BLOOD PRESSURE: 163 MMHG | HEART RATE: 79 BPM

## 2017-09-11 LAB
ALBUMIN SERPL ELPH-MCNC: 4.7 G/DL — SIGNIFICANT CHANGE UP (ref 3.3–5)
ALP SERPL-CCNC: 38 U/L — LOW (ref 40–120)
ALT FLD-CCNC: 48 U/L RC — HIGH (ref 10–45)
ANION GAP SERPL CALC-SCNC: 13 MMOL/L — SIGNIFICANT CHANGE UP (ref 5–17)
AST SERPL-CCNC: 27 U/L — SIGNIFICANT CHANGE UP (ref 10–40)
BILIRUB SERPL-MCNC: 1.4 MG/DL — HIGH (ref 0.2–1.2)
BUN SERPL-MCNC: 8 MG/DL — SIGNIFICANT CHANGE UP (ref 7–23)
CALCIUM SERPL-MCNC: 10.3 MG/DL — SIGNIFICANT CHANGE UP (ref 8.4–10.5)
CHLORIDE SERPL-SCNC: 96 MMOL/L — SIGNIFICANT CHANGE UP (ref 96–108)
CO2 SERPL-SCNC: 32 MMOL/L — HIGH (ref 22–31)
CREAT SERPL-MCNC: 0.88 MG/DL — SIGNIFICANT CHANGE UP (ref 0.5–1.3)
GLUCOSE SERPL-MCNC: 129 MG/DL — HIGH (ref 70–99)
LIDOCAIN IGE QN: 100 U/L — HIGH (ref 7–60)
POTASSIUM SERPL-MCNC: 4 MMOL/L — SIGNIFICANT CHANGE UP (ref 3.5–5.3)
POTASSIUM SERPL-SCNC: 4 MMOL/L — SIGNIFICANT CHANGE UP (ref 3.5–5.3)
PROT SERPL-MCNC: 7.6 G/DL — SIGNIFICANT CHANGE UP (ref 6–8.3)
SODIUM SERPL-SCNC: 141 MMOL/L — SIGNIFICANT CHANGE UP (ref 135–145)

## 2017-09-11 PROCEDURE — 82150 ASSAY OF AMYLASE: CPT

## 2017-09-11 PROCEDURE — 83735 ASSAY OF MAGNESIUM: CPT

## 2017-09-11 PROCEDURE — 85027 COMPLETE CBC AUTOMATED: CPT

## 2017-09-11 PROCEDURE — 99233 SBSQ HOSP IP/OBS HIGH 50: CPT

## 2017-09-11 PROCEDURE — 80053 COMPREHEN METABOLIC PANEL: CPT

## 2017-09-11 PROCEDURE — 76705 ECHO EXAM OF ABDOMEN: CPT

## 2017-09-11 PROCEDURE — 80307 DRUG TEST PRSMV CHEM ANLYZR: CPT

## 2017-09-11 PROCEDURE — 85610 PROTHROMBIN TIME: CPT

## 2017-09-11 PROCEDURE — 84100 ASSAY OF PHOSPHORUS: CPT

## 2017-09-11 PROCEDURE — 96375 TX/PRO/DX INJ NEW DRUG ADDON: CPT

## 2017-09-11 PROCEDURE — 96374 THER/PROPH/DIAG INJ IV PUSH: CPT | Mod: XU

## 2017-09-11 PROCEDURE — 93005 ELECTROCARDIOGRAM TRACING: CPT

## 2017-09-11 PROCEDURE — 80076 HEPATIC FUNCTION PANEL: CPT

## 2017-09-11 PROCEDURE — 84443 ASSAY THYROID STIM HORMONE: CPT

## 2017-09-11 PROCEDURE — 83036 HEMOGLOBIN GLYCOSYLATED A1C: CPT

## 2017-09-11 PROCEDURE — 87389 HIV-1 AG W/HIV-1&-2 AB AG IA: CPT

## 2017-09-11 PROCEDURE — 74177 CT ABD & PELVIS W/CONTRAST: CPT

## 2017-09-11 PROCEDURE — 80048 BASIC METABOLIC PNL TOTAL CA: CPT

## 2017-09-11 PROCEDURE — 99285 EMERGENCY DEPT VISIT HI MDM: CPT | Mod: 25

## 2017-09-11 PROCEDURE — 83690 ASSAY OF LIPASE: CPT

## 2017-09-11 PROCEDURE — 85730 THROMBOPLASTIN TIME PARTIAL: CPT

## 2017-09-11 RX ORDER — METOPROLOL TARTRATE 50 MG
1 TABLET ORAL
Qty: 90 | Refills: 0 | OUTPATIENT
Start: 2017-09-11 | End: 2017-10-11

## 2017-09-11 RX ORDER — FOLIC ACID 0.8 MG
1 TABLET ORAL
Qty: 30 | Refills: 0
Start: 2017-09-11 | End: 2017-10-11

## 2017-09-11 RX ADMIN — HYDROMORPHONE HYDROCHLORIDE 1 MILLIGRAM(S): 2 INJECTION INTRAMUSCULAR; INTRAVENOUS; SUBCUTANEOUS at 00:09

## 2017-09-11 RX ADMIN — Medication 1 TABLET(S): at 13:21

## 2017-09-11 RX ADMIN — Medication 1 MILLIGRAM(S): at 02:11

## 2017-09-11 RX ADMIN — Medication 1 MILLIGRAM(S): at 06:14

## 2017-09-11 RX ADMIN — SODIUM CHLORIDE 100 MILLILITER(S): 9 INJECTION, SOLUTION INTRAVENOUS at 13:21

## 2017-09-11 RX ADMIN — HYDROMORPHONE HYDROCHLORIDE 1 MILLIGRAM(S): 2 INJECTION INTRAMUSCULAR; INTRAVENOUS; SUBCUTANEOUS at 12:21

## 2017-09-11 RX ADMIN — Medication 100 MILLIGRAM(S): at 05:00

## 2017-09-11 RX ADMIN — Medication 1 MILLIGRAM(S): at 12:22

## 2017-09-11 RX ADMIN — SODIUM CHLORIDE 100 MILLILITER(S): 9 INJECTION, SOLUTION INTRAVENOUS at 00:16

## 2017-09-11 RX ADMIN — Medication 1 MILLIGRAM(S): at 13:21

## 2017-09-11 RX ADMIN — HYDROMORPHONE HYDROCHLORIDE 1 MILLIGRAM(S): 2 INJECTION INTRAMUSCULAR; INTRAVENOUS; SUBCUTANEOUS at 05:10

## 2017-09-11 RX ADMIN — Medication 50 MILLIGRAM(S): at 13:21

## 2017-09-11 RX ADMIN — HYDROMORPHONE HYDROCHLORIDE 1 MILLIGRAM(S): 2 INJECTION INTRAMUSCULAR; INTRAVENOUS; SUBCUTANEOUS at 04:52

## 2017-09-11 RX ADMIN — HYDROMORPHONE HYDROCHLORIDE 1 MILLIGRAM(S): 2 INJECTION INTRAMUSCULAR; INTRAVENOUS; SUBCUTANEOUS at 12:36

## 2017-09-11 RX ADMIN — Medication 50 MILLIGRAM(S): at 05:00

## 2017-09-11 RX ADMIN — HYDROMORPHONE HYDROCHLORIDE 1 MILLIGRAM(S): 2 INJECTION INTRAMUSCULAR; INTRAVENOUS; SUBCUTANEOUS at 00:25

## 2017-09-11 NOTE — DISCHARGE NOTE ADULT - CARE PLAN
Principal Discharge DX:	Alcohol-induced acute pancreatitis without infection or necrosis  Goal:	Resolved.  Instructions for follow-up, activity and diet:	Follow up with your Internist, Dr. Menard in 1 week.  Secondary Diagnosis:	Essential hypertension  Instructions for follow-up, activity and diet:	Take your medication as prescribed.  Follow up with your medical doctor for routine blood pressure monitoring, and to establish long term blood pressure treatment goals.  Low salt diet  Activity as tolerated.  Notify your doctor if you have any of the following symptoms:   Dizziness, Lightheadedness, Blurry vision, Headache, Chest pain, Shortness of breath  Secondary Diagnosis:	Alcohol dependence with uncomplicated withdrawal  Instructions for follow-up, activity and diet:	Follow up with  Secondary Diagnosis:	Agitation requiring sedation protocol  Instructions for follow-up, activity and diet:	Follow up with your Internist, Dr. Menard in 1 week.

## 2017-09-11 NOTE — DISCHARGE NOTE ADULT - PROVIDER TOKENS
TOKEN:'[id number]',FREE:[LAST:[[facility]],PHONE:[phone]],FAX:[(   [phone]    -],ADDRESS:[[facility].]]

## 2017-09-11 NOTE — PROGRESS NOTE BEHAVIORAL HEALTH - NSBHFUPINTERVALCCFT_PSY_A_CORE
I feel better today, I was not very nice yesterday
I would like to leave
"I don't feel like talking."

## 2017-09-11 NOTE — PROGRESS NOTE BEHAVIORAL HEALTH - NSBHCHARTREVIEWLAB_PSY_A_CORE FT
14.7   8.22  )-----------( 147      ( 09 Sep 2017 11:32 )             41.3     09-09    137  |  97  |  12  ----------------------------<  78  4.3   |  23  |  0.77    Ca    10.1      09 Sep 2017 11:32  Phos  3.2     09-08  Mg     1.8     09-08    TPro  7.2  /  Alb  4.5  /  TBili  1.6<H>  /  DBili  x   /  AST  38  /  ALT  68<H>  /  AlkPhos  38<L>  09-09
14.7   8.22  )-----------( 147      ( 09 Sep 2017 11:32 )             41.3     09-09    138  |  96  |  14  ----------------------------<  68<L>  4.0   |  23  |  0.76    Ca    9.9      09 Sep 2017 18:32  Phos  3.4     09-09  Mg     1.7     09-09    TPro  7.2  /  Alb  4.5  /  TBili  1.6<H>  /  DBili  x   /  AST  38  /  ALT  68<H>  /  AlkPhos  38<L>  09-09
09-09    138  |  96  |  14  ----------------------------<  68<L>  4.0   |  23  |  0.76    Ca    9.9      09 Sep 2017 18:32  Phos  3.4     09-09  Mg     1.7     09-09

## 2017-09-11 NOTE — PROGRESS NOTE BEHAVIORAL HEALTH - RISK ASSESSMENT
No e/o suicidality or homicidality

## 2017-09-11 NOTE — DISCHARGE NOTE ADULT - PLAN OF CARE
Resolved. Follow up with your Internist, Dr. Menard in 1 week. Take your medication as prescribed.  Follow up with your medical doctor for routine blood pressure monitoring, and to establish long term blood pressure treatment goals.  Low salt diet  Activity as tolerated.  Notify your doctor if you have any of the following symptoms:   Dizziness, Lightheadedness, Blurry vision, Headache, Chest pain, Shortness of breath Follow up with

## 2017-09-11 NOTE — PROGRESS NOTE BEHAVIORAL HEALTH - NSBHCONSULTFOLLOWAFTERCARE_PSY_A_CORE FT
OP psych f/u at [facility]: [phone].
OP psych f/u at [facility]: [phone].
OP psych f/u at [facility]: [phone].

## 2017-09-11 NOTE — PROGRESS NOTE BEHAVIORAL HEALTH - PRIMARY DX
Alcohol dependence with uncomplicated withdrawal
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
## 2017-09-11 NOTE — PROVIDER CONTACT NOTE (OTHER) - ASSESSMENT
Pt. w/ no c/o SOB, palpitations, or chest pain.
Pt sleepy and on 1:1 observation
PT. not compliant, refusing to listen to RN and MD.
Pt. stable no c/o chest pain no c/o palpitations, no SOB.

## 2017-09-11 NOTE — PROGRESS NOTE ADULT - PROBLEM SELECTOR PLAN 1
continue clear liquids  will advance diet as tolerated  follow amylase and lipase
Patient on CIWA protocol with agitation and acute pancreatitis. Psyc called to see patient
continue clear liquids   follow amylase and lipase
continue clear liquids  will advance diet as tolerated  follow amylase and lipase

## 2017-09-11 NOTE — PROGRESS NOTE BEHAVIORAL HEALTH - NSBHCHARTREVIEWVS_PSY_A_CORE FT
Vital Signs Last 24 Hrs  T(C): 36.4 (08 Sep 2017 21:33), Max: 36.8 (08 Sep 2017 15:38)  T(F): 97.5 (08 Sep 2017 21:33), Max: 98.3 (08 Sep 2017 15:38)  HR: 90 (08 Sep 2017 21:33) (87 - 109)  BP: 150/105 (08 Sep 2017 21:33) (150/105 - 172/107)  BP(mean): --  RR: 18 (08 Sep 2017 21:33) (18 - 18)  SpO2: 96% (08 Sep 2017 20:07) (95% - 97%)
Vital Signs Last 24 Hrs  T(C): 36.6 (10 Sep 2017 05:54), Max: 36.8 (09 Sep 2017 19:17)  T(F): 97.8 (10 Sep 2017 05:54), Max: 98.2 (09 Sep 2017 19:17)  HR: 77 (10 Sep 2017 05:54) (74 - 96)  BP: 168/104 (10 Sep 2017 05:54) (144/94 - 168/104)  BP(mean): --  RR: 18 (10 Sep 2017 05:54) (18 - 18)  SpO2: 98% (10 Sep 2017 05:54) (95% - 98%)
T(C): 36.4 (09-11-17 @ 13:29), Max: 37.1 (09-11-17 @ 08:00)  HR: 71 (09-11-17 @ 13:29) (63 - 79)  BP: 145/97 (09-11-17 @ 13:29) (145/97 - 177/100)  RR: 18 (09-11-17 @ 13:29) (18 - 18)  SpO2: 95% (09-11-17 @ 13:29) (95% - 99%)  Wt(kg): --    CIWA 0

## 2017-09-11 NOTE — PROGRESS NOTE BEHAVIORAL HEALTH - NSBHCONSULTMEDS_PSY_A_CORE FT
1. C/w Ativan taper as currently ordered    2. c/w CIWA, thiamine/MVI/folic acid    3. PRN: Ativan 2mg PO q2h PRN sx-triggered CIWA.  Haldol 2.5mg IV q6h PRN agitation    4. SW for substance abuse referral resources.  OP psych f/u at [facility]: [phone].
1. Recommend increasing Ativan taper as follows:  Ativan 3mg PO q4h x6 doses, 2mg PO q4h x6 doses, 1mg PO q4h x6 doses, 1mg PO q12h x2 doses, 0.5mg PO q12h x2 dose --> off    2. CIWA, thiamine/MVI/folic acid.  Thiamine 500mg IV tid x9 doses.    3. PRN: Ativan 2mg PO q2h PRN sx-triggered CIWA.  Check EKG; if QTc <500, can use Haldol 2.5mg IV q6h PRN agitation.    4. SW for substance abuse referral resources.  OP psych f/u at [facility]: [phone].
1. Complete Ativan taper as recommended:  Ativan 3mg PO q4h x6 doses, 2mg PO q4h x6 doses, 1mg PO q4h x6 doses, 1mg PO q12h x2 doses, 0.5mg PO q12h x2 dose --> off    2. CIWA, thiamine/MVI/folic acid.  Thiamine 500mg IV tid x9 doses.    3. PRN: Ativan 2mg PO q2h PRN sx-triggered CIWA.  Check EKG; if QTc <500, can use Haldol 2.5mg IV q6h PRN agitation.    4. SW for substance abuse referral resources.  OP psych f/u at [facility]: [phone].

## 2017-09-11 NOTE — PROGRESS NOTE ADULT - PROBLEM SELECTOR PLAN 3
appreciate psychiatry's input continue ativan taper with breakthrough doses for agitation
Alprazolam as per CIWA protocol
appreciate psychiatry's input continue ativan taper with breakthrough doses for agitation
appreciate psychiatry's input continue ativan taper with breakthrough doses for agitation

## 2017-09-11 NOTE — PROGRESS NOTE BEHAVIORAL HEALTH - NSBHFUPINTERVALHXFT_PSY_A_CORE
Patient seen and evaluated covering for Dr. Khan. Chart, labs, meds reviewed, original consult appreciated. Staff consulted, mother and step-father interviewed. Patient seen pacing in the hallway, but agrees to have a conversation in his room. He is borderline belligerent, but remains composed throughout the conversation. He once again elaborates he knows why he is admitted to the hospital, what and how is he being treated for, and what are the risks and benefits of tx. and foregoing the same, including the risks should he continue drinking. During the interview, however, patient nods off a few times, closes his eyes and becomes unresponsive for 10-15 seconds. He then comes about, asks "Huh, what did you say?" and, when asked about the event, states "I was just being bored". He appears unable to process information during these episodes or to recall what was said during those times.    Given these multiple, short episodes of AMS during the interview, patient likely has impaired cognition and is unable to refuse critical care at this time, including to leave AMA.    Patient will be re-evaluated in the AM for further determination.
Patient seen and evaluated covering for Dr. Khan. Chart, labs, meds reviewed, staff consulted. Patient seen sitting on his bed, calm, appropriate, collected. He is cooperative, with more pleasant demeanor. He recounts the conversation from yesterday and tells the PCA "I was nodding off while talking to this angela". He again demonstrated the understanding of his condition and how is he being treated for it. He is concerned about his pain and would like to have it resolved before he leaves the hospital. He demonstrated none of the episodes of impaired sensorium that were observed yesterday. He is now much closer to his baseline.    In light of improved, stable mental status and no clouding of sensorium, patient has decisional capacity to make decisions concerning his care, including to leave AMA.    Patient will be re-evaluated in the AM for further determination.
Pt seemed, calm but uncooperative, irritable.  States he is not interested in speaking right now, but denies sx of ETOH w/d, denies SIIP/HIIP, AVH, or paranoia.  States pain is fairly well-controlled.  CIWA 0, VS with normal HR but still with HTN.

## 2017-09-11 NOTE — PROGRESS NOTE ADULT - PROBLEM SELECTOR PROBLEM 1
Alcoholic pancreatitis
Alcohol dependence with uncomplicated withdrawal

## 2017-09-11 NOTE — PROGRESS NOTE ADULT - PROBLEM SELECTOR PROBLEM 3
Agitation requiring sedation protocol
Alcohol abuse

## 2017-09-11 NOTE — DISCHARGE NOTE ADULT - CARE PROVIDER_API CALL
Moe Menard (DO), Medicine  [address] [place], [place] [address]  Phone: [phone]  Fax: [phone]    [facility],   [facility] Patient [facility].  Phone: [phone]  Fax: (   )    -
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
## 2017-09-11 NOTE — PROVIDER CONTACT NOTE (OTHER) - ACTION/TREATMENT ORDERED:
Security notified. Bibi CORONA notified. Dr Menard notified.
Continue to monitor as per CIWA scale.
Pt alseep PA aware. Continue to monitor.
Ativan 2mg IVP to be given. PT. Psych consult to be done. EKG to be done.
Continue to monitor.

## 2017-09-11 NOTE — DISCHARGE NOTE ADULT - PATIENT PORTAL LINK FT
“You can access the Follow[facility] Patient Portal, offered by [facility], by registering with the following website: [email or web address]”

## 2017-09-11 NOTE — PROGRESS NOTE BEHAVIORAL HEALTH - NSBHCHARTREVIEWINVESTIGATE_PSY_A_CORE FT
< from: 12 Lead ECG (09.08.17 @ 22:43) >      Ventricular Rate 84 BPM    Atrial Rate 84 BPM    P-R Interval 136 ms    QRS Duration 98 ms     ms    QTc 418 ms    P Axis 36 degrees    R Axis 26 degrees    T Axis 21 degrees    Diagnosis Line NORMAL SINUS RHYTHM  NORMAL ECG    < end of copied text >
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
## 2017-09-11 NOTE — PROGRESS NOTE ADULT - PROBLEM SELECTOR PROBLEM 4
Essential hypertension
Alcohol-induced acute pancreatitis without infection or necrosis

## 2017-09-11 NOTE — PROGRESS NOTE ADULT - PROBLEM SELECTOR PLAN 4
elevated BP may be due to pain will continue to follow dosing
Follow Lipase  Pain management  - Use Dilaudid 3 mg ivp q3h  Reassess in am If patient pain free decrease to 3 mg q4 hours
elevated BP may be due to pain will continue to follow dosing
elevated BP may be due to pain will continue to follow dosing
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
## 2017-09-11 NOTE — PROVIDER CONTACT NOTE (OTHER) - SITUATION
Pt. refusing Vitals, and EKG.
Pt. B/P elevated. Pulse elevated.
Pt. very aggitated. PT. stating, "he's going to leave the hospital." States, "he's going to call 911."
see above

## 2017-09-11 NOTE — DISCHARGE NOTE ADULT - HOSPITAL COURSE
36 yo male hx of  Hypertension, and ETOH abuse admitted  with pancreatitis. Treated medically for Alcohol withdrawal and  Pancreatitis resolved. Able to tolerate Regular diet, and without  sign of Alcohol withdrawal. Now stable and medically cleared for discharge home.   Pt to follow up with in PMD's Office and [facility] next week for continued management.

## 2017-09-11 NOTE — PROGRESS NOTE ADULT - PROBLEM SELECTOR PLAN 2
continue cwar protocol  continue adivan taper
Increase metoprolol to 50 mg po tid
continue cwar protocol
finished ativan taper

## 2017-10-08 ENCOUNTER — EMERGENCY (EMERGENCY)
Facility: HOSPITAL | Age: 37
LOS: 1 days | Discharge: HOME | End: 2017-10-08
Admitting: EMERGENCY MEDICINE
Payer: COMMERCIAL

## 2017-10-08 VITALS
WEIGHT: 156.97 LBS | HEART RATE: 80 BPM | RESPIRATION RATE: 16 BRPM | HEIGHT: 66 IN | TEMPERATURE: 99 F | DIASTOLIC BLOOD PRESSURE: 85 MMHG | SYSTOLIC BLOOD PRESSURE: 174 MMHG | OXYGEN SATURATION: 97 %

## 2017-10-08 VITALS
TEMPERATURE: 98 F | DIASTOLIC BLOOD PRESSURE: 97 MMHG | OXYGEN SATURATION: 98 % | SYSTOLIC BLOOD PRESSURE: 170 MMHG | HEART RATE: 98 BPM | RESPIRATION RATE: 18 BRPM

## 2017-10-08 DIAGNOSIS — Z87.09 PERSONAL HISTORY OF OTHER DISEASES OF THE RESPIRATORY SYSTEM: Chronic | ICD-10-CM

## 2017-10-08 DIAGNOSIS — Z98.890 OTHER SPECIFIED POSTPROCEDURAL STATES: Chronic | ICD-10-CM

## 2017-10-08 LAB
ALBUMIN SERPL ELPH-MCNC: 5.4 G/DL — HIGH (ref 3.3–5)
ALP SERPL-CCNC: 40 U/L — SIGNIFICANT CHANGE UP (ref 40–120)
ALT FLD-CCNC: 66 U/L RC — HIGH (ref 10–45)
ANION GAP SERPL CALC-SCNC: 16 MMOL/L — SIGNIFICANT CHANGE UP (ref 5–17)
AST SERPL-CCNC: 53 U/L — HIGH (ref 10–40)
BASOPHILS # BLD AUTO: 0.1 K/UL — SIGNIFICANT CHANGE UP (ref 0–0.2)
BASOPHILS NFR BLD AUTO: 0.8 % — SIGNIFICANT CHANGE UP (ref 0–2)
BILIRUB SERPL-MCNC: 1.4 MG/DL — HIGH (ref 0.2–1.2)
BUN SERPL-MCNC: 18 MG/DL — SIGNIFICANT CHANGE UP (ref 7–23)
CALCIUM SERPL-MCNC: 10.6 MG/DL — HIGH (ref 8.4–10.5)
CHLORIDE SERPL-SCNC: 96 MMOL/L — SIGNIFICANT CHANGE UP (ref 96–108)
CO2 SERPL-SCNC: 32 MMOL/L — HIGH (ref 22–31)
CREAT SERPL-MCNC: 0.88 MG/DL — SIGNIFICANT CHANGE UP (ref 0.5–1.3)
EOSINOPHIL # BLD AUTO: 0.1 K/UL — SIGNIFICANT CHANGE UP (ref 0–0.5)
EOSINOPHIL NFR BLD AUTO: 0.7 % — SIGNIFICANT CHANGE UP (ref 0–6)
GLUCOSE SERPL-MCNC: 97 MG/DL — SIGNIFICANT CHANGE UP (ref 70–99)
HCT VFR BLD CALC: 49.5 % — SIGNIFICANT CHANGE UP (ref 39–50)
HGB BLD-MCNC: 17.4 G/DL — HIGH (ref 13–17)
LIDOCAIN IGE QN: 49 U/L — SIGNIFICANT CHANGE UP (ref 7–60)
LYMPHOCYTES # BLD AUTO: 1.2 K/UL — SIGNIFICANT CHANGE UP (ref 1–3.3)
LYMPHOCYTES # BLD AUTO: 15.6 % — SIGNIFICANT CHANGE UP (ref 13–44)
MAGNESIUM SERPL-MCNC: 1.7 MG/DL — SIGNIFICANT CHANGE UP (ref 1.6–2.6)
MCHC RBC-ENTMCNC: 33.8 PG — SIGNIFICANT CHANGE UP (ref 27–34)
MCHC RBC-ENTMCNC: 35.1 GM/DL — SIGNIFICANT CHANGE UP (ref 32–36)
MCV RBC AUTO: 96.2 FL — SIGNIFICANT CHANGE UP (ref 80–100)
MONOCYTES # BLD AUTO: 0.8 K/UL — SIGNIFICANT CHANGE UP (ref 0–0.9)
MONOCYTES NFR BLD AUTO: 11 % — SIGNIFICANT CHANGE UP (ref 2–14)
NEUTROPHILS # BLD AUTO: 5.5 K/UL — SIGNIFICANT CHANGE UP (ref 1.8–7.4)
NEUTROPHILS NFR BLD AUTO: 71.8 % — SIGNIFICANT CHANGE UP (ref 43–77)
PHOSPHATE SERPL-MCNC: 3.4 MG/DL — SIGNIFICANT CHANGE UP (ref 2.5–4.5)
PLATELET # BLD AUTO: 182 K/UL — SIGNIFICANT CHANGE UP (ref 150–400)
POTASSIUM SERPL-MCNC: 4.4 MMOL/L — SIGNIFICANT CHANGE UP (ref 3.5–5.3)
POTASSIUM SERPL-SCNC: 4.4 MMOL/L — SIGNIFICANT CHANGE UP (ref 3.5–5.3)
PROT SERPL-MCNC: 8.4 G/DL — HIGH (ref 6–8.3)
RBC # BLD: 5.15 M/UL — SIGNIFICANT CHANGE UP (ref 4.2–5.8)
RBC # FLD: 12.1 % — SIGNIFICANT CHANGE UP (ref 10.3–14.5)
SODIUM SERPL-SCNC: 144 MMOL/L — SIGNIFICANT CHANGE UP (ref 135–145)
WBC # BLD: 7.6 K/UL — SIGNIFICANT CHANGE UP (ref 3.8–10.5)
WBC # FLD AUTO: 7.6 K/UL — SIGNIFICANT CHANGE UP (ref 3.8–10.5)

## 2017-10-08 PROCEDURE — 71046 X-RAY EXAM CHEST 2 VIEWS: CPT

## 2017-10-08 PROCEDURE — 83690 ASSAY OF LIPASE: CPT

## 2017-10-08 PROCEDURE — 74177 CT ABD & PELVIS W/CONTRAST: CPT | Mod: 26

## 2017-10-08 PROCEDURE — 71020: CPT | Mod: 26

## 2017-10-08 PROCEDURE — 85027 COMPLETE CBC AUTOMATED: CPT

## 2017-10-08 PROCEDURE — 99284 EMERGENCY DEPT VISIT MOD MDM: CPT | Mod: 25

## 2017-10-08 PROCEDURE — 74177 CT ABD & PELVIS W/CONTRAST: CPT

## 2017-10-08 PROCEDURE — 96375 TX/PRO/DX INJ NEW DRUG ADDON: CPT

## 2017-10-08 PROCEDURE — 83735 ASSAY OF MAGNESIUM: CPT

## 2017-10-08 PROCEDURE — 96374 THER/PROPH/DIAG INJ IV PUSH: CPT | Mod: XU

## 2017-10-08 PROCEDURE — 84100 ASSAY OF PHOSPHORUS: CPT

## 2017-10-08 PROCEDURE — 99285 EMERGENCY DEPT VISIT HI MDM: CPT

## 2017-10-08 PROCEDURE — 80053 COMPREHEN METABOLIC PANEL: CPT

## 2017-10-08 RX ORDER — HYDROMORPHONE HYDROCHLORIDE 2 MG/ML
1 INJECTION INTRAMUSCULAR; INTRAVENOUS; SUBCUTANEOUS ONCE
Qty: 0 | Refills: 0 | Status: DISCONTINUED | OUTPATIENT
Start: 2017-10-08 | End: 2017-10-08

## 2017-10-08 RX ORDER — SODIUM CHLORIDE 9 MG/ML
2000 INJECTION INTRAMUSCULAR; INTRAVENOUS; SUBCUTANEOUS ONCE
Qty: 0 | Refills: 0 | Status: COMPLETED | OUTPATIENT
Start: 2017-10-08 | End: 2017-10-08

## 2017-10-08 RX ORDER — ONDANSETRON 8 MG/1
4 TABLET, FILM COATED ORAL ONCE
Qty: 0 | Refills: 0 | Status: COMPLETED | OUTPATIENT
Start: 2017-10-08 | End: 2017-10-08

## 2017-10-08 RX ADMIN — HYDROMORPHONE HYDROCHLORIDE 1 MILLIGRAM(S): 2 INJECTION INTRAMUSCULAR; INTRAVENOUS; SUBCUTANEOUS at 16:17

## 2017-10-08 RX ADMIN — SODIUM CHLORIDE 2000 MILLILITER(S): 9 INJECTION INTRAMUSCULAR; INTRAVENOUS; SUBCUTANEOUS at 16:17

## 2017-10-08 RX ADMIN — ONDANSETRON 4 MILLIGRAM(S): 8 TABLET, FILM COATED ORAL at 16:17

## 2017-10-08 NOTE — ED PROVIDER NOTE - NS ED ROS FT
CONST: no fevers, no chills  EYES: no pain  ENT: no sore throat   CV: no chest pain  RESP: no shortness of breath  ABD: 10/0 abdominal pain   : no dysuria  MSK: no back pain  NEURO: no headache or additional neurologic complaints  HEME: no easy bleeding  SKIN:  no rash

## 2017-10-08 NOTE — ED PROVIDER NOTE - PROGRESS NOTE DETAILS
Patient with improved pain, now minimal , ct with acute pathology, lipase normal, no nausea and vomiting in ED, tolerating PO, abdomen ssnt  Discussed labwork, dstrict return precautions, no signs of alcohol withdrawal, he feels well,

## 2017-10-08 NOTE — ED PROVIDER NOTE - NS ED MD SHIFT CHANGE PLANNED DISPOSITION
pending test results documented on template/clinical impression documented on template/admit to hospital for observation

## 2017-10-08 NOTE — ED PROVIDER NOTE - SHIFT CHANGE DETAILS
Pt w/ history of ETOH pancreatitis, p/w AP consistent with past episodes. No prior history of pseudocyst. Will likely need admit Echo Massey MD - Attending Physician: Pt w/ history of ETOH pancreatitis, p/w AP consistent with past episodes. No prior history of pseudocyst. Labs and dispo pending at time of signout

## 2017-10-08 NOTE — ED ADULT NURSE NOTE - OBJECTIVE STATEMENT
37 y.o male pmh chronic pancreatitis and chronic alcoholism with multiple admissions to hospital for tx presenting to ED accompanied by his family c/o upper abdominal pain and vomiting x 3 days and unable to tolerate PO. pt states his last drink was friday-3 days ago d/t being unable to tolerate PO from a flare up with his pancreatitis. pt. describes the abdominal pain as sharp and stabbing, no vomiting since arrival in the ED, calm and cooperative with no other associated withdrawal s/s present upon exam. VS stable. denies any fever, diarrhea, weakness, diaphoresis, hallucinations, blurred vison, dizziness, cp, or sob. labs and line obtained, fluids infusing with zofran and 1 mg of Dilaudid administered IVP, tolerated well. results pending. pt pending ct scan of abdomen. safety and fall precautions maintained.

## 2017-10-08 NOTE — ED PROVIDER NOTE - PLAN OF CARE
1) Take Tylenol 325mg tablet, take 2 tablets every 6-8 hours as needed for pain   2) Drink plenty of fluids   3) Please follow up with your primary medical doctor in 1-2 days for reevaluation. If you do not have pmd please call the general medicine clinic for an appointment at [phone].   4) Also follow p with GI call [phone] if you cannot see a private doctor   5) You were given a copy of your results, please show them to your doctor for review.   6) Return to the ED for worsening pain, nausea, vomiting, dizziness, lightheadedness, chest pain, shortness of breath, abdominal pain, fever greater than 100.4, or if you have any other new, worsening, or concerning symptoms.

## 2017-10-08 NOTE — ED PROVIDER NOTE - OBJECTIVE STATEMENT
37 year old, presenting with abdominal pain and vomiting, history of pancreatitis 3x admissions since May 2017, etoh abuse history last drink was Friday. denies withdrawal symptoms during previous admissions for pancreatitis. eating makes symptoms worse, difficulty tolerating liquids. yesterday 25x vomit, today only 1x. Patient states some red streaks mixed with bile. pain in abdomen radiates to back. mild dysuria, dark urine.    PMD: Derian/Steph

## 2017-10-08 NOTE — ED PROVIDER NOTE - CARE PLAN
Principal Discharge DX:	Epigastric pain  Instructions for follow-up, activity and diet:	1) Take Tylenol 325mg tablet, take 2 tablets every 6-8 hours as needed for pain   2) Drink plenty of fluids   3) Please follow up with your primary medical doctor in 1-2 days for reevaluation. If you do not have pmd please call the general medicine clinic for an appointment at [phone].   4) Also follow p with GI call [phone] if you cannot see a private doctor   5) You were given a copy of your results, please show them to your doctor for review.   6) Return to the ED for worsening pain, nausea, vomiting, dizziness, lightheadedness, chest pain, shortness of breath, abdominal pain, fever greater than 100.4, or if you have any other new, worsening, or concerning symptoms.

## 2017-10-08 NOTE — ED PROVIDER NOTE - ATTENDING CONTRIBUTION TO CARE
MD El:  patient seen and evaluated with the resident.  I was present for key portions of the History & Physical, and I agree with the Impression & Plan.  MD El: 38 yo M, bib family for upper abdominal pain x 48 hrs; ever since ETOH binge 2d ago.  +MHx ETOH pancreatitis.  No back pain.  +N/V.  Emesis mostly bile with red streaks.  Intensity:  8/10.  Location:  epigastrium/RUQ.  VS - HTN.  Afebrile.  Physical Exam: adult M, anxious, NCAT, PERRL, EOMI, Neck supple, CTA B, RRR, Abd:  s/nd +epigastric TTP.  Ext:  no edema.  Impression:  likely recurrent ETOH-pancreatitis.  Plan:  labs, hydrate, no CT at this time.

## 2017-10-08 NOTE — ED PROVIDER NOTE - PHYSICAL EXAMINATION
Carlyle: A & O x 3, NAD, HEENT WNL and no facial asymmetry; lungs CTAB, heart with reg rhythm; abdomen with voluntary guarding and epigastric tenderness, extremities with no edema; skin with numerous tattoos no rashes, neuro exam non focal with no motor or sensory deficits

## 2017-11-01 NOTE — ED PROVIDER NOTE - NS ED ATTENDING STATEMENT MOD
I have personally seen and examined this patient.  I have fully participated in the care of this patient. I have reviewed all pertinent clinical information, including history, physical exam, plan and the Resident’s note and agree except as noted.
no enlarged lymph nodes/no tender lymph nodes

## 2018-08-17 ENCOUNTER — INPATIENT (INPATIENT)
Facility: HOSPITAL | Age: 38
LOS: 13 days | Discharge: HOME | DRG: 438 | End: 2018-08-31
Attending: INTERNAL MEDICINE | Admitting: INTERNAL MEDICINE
Payer: COMMERCIAL

## 2018-08-17 VITALS
HEART RATE: 105 BPM | SYSTOLIC BLOOD PRESSURE: 159 MMHG | HEIGHT: 67 IN | TEMPERATURE: 98 F | RESPIRATION RATE: 20 BRPM | OXYGEN SATURATION: 98 % | WEIGHT: 160.06 LBS | DIASTOLIC BLOOD PRESSURE: 109 MMHG

## 2018-08-17 DIAGNOSIS — Z98.890 OTHER SPECIFIED POSTPROCEDURAL STATES: Chronic | ICD-10-CM

## 2018-08-17 DIAGNOSIS — K85.20 ALCOHOL INDUCED ACUTE PANCREATITIS WITHOUT NECROSIS OR INFECTION: ICD-10-CM

## 2018-08-17 DIAGNOSIS — I10 ESSENTIAL (PRIMARY) HYPERTENSION: ICD-10-CM

## 2018-08-17 DIAGNOSIS — Z87.09 PERSONAL HISTORY OF OTHER DISEASES OF THE RESPIRATORY SYSTEM: Chronic | ICD-10-CM

## 2018-08-17 DIAGNOSIS — F10.10 ALCOHOL ABUSE, UNCOMPLICATED: ICD-10-CM

## 2018-08-17 LAB
ALBUMIN SERPL ELPH-MCNC: 5.8 G/DL — HIGH (ref 3.3–5)
ALP SERPL-CCNC: 45 U/L — SIGNIFICANT CHANGE UP (ref 40–120)
ALT FLD-CCNC: 45 U/L — SIGNIFICANT CHANGE UP (ref 10–45)
ANION GAP SERPL CALC-SCNC: 22 MMOL/L — HIGH (ref 5–17)
APTT BLD: 30.3 SEC — SIGNIFICANT CHANGE UP (ref 27.5–37.4)
AST SERPL-CCNC: 48 U/L — HIGH (ref 10–40)
BASE EXCESS BLDV CALC-SCNC: 0.1 MMOL/L — SIGNIFICANT CHANGE UP (ref -2–2)
BASE EXCESS BLDV CALC-SCNC: 2.9 MMOL/L — HIGH (ref -2–2)
BASOPHILS # BLD AUTO: 0.1 K/UL — SIGNIFICANT CHANGE UP (ref 0–0.2)
BASOPHILS NFR BLD AUTO: 0.9 % — SIGNIFICANT CHANGE UP (ref 0–2)
BILIRUB SERPL-MCNC: 1.4 MG/DL — HIGH (ref 0.2–1.2)
BUN SERPL-MCNC: 19 MG/DL — SIGNIFICANT CHANGE UP (ref 7–23)
CA-I SERPL-SCNC: 1.04 MMOL/L — LOW (ref 1.12–1.3)
CA-I SERPL-SCNC: 1.21 MMOL/L — SIGNIFICANT CHANGE UP (ref 1.12–1.3)
CALCIUM SERPL-MCNC: 11.1 MG/DL — HIGH (ref 8.4–10.5)
CHLORIDE BLDV-SCNC: 107 MMOL/L — SIGNIFICANT CHANGE UP (ref 96–108)
CHLORIDE BLDV-SCNC: 99 MMOL/L — SIGNIFICANT CHANGE UP (ref 96–108)
CHLORIDE SERPL-SCNC: 91 MMOL/L — LOW (ref 96–108)
CO2 BLDV-SCNC: 27 MMOL/L — SIGNIFICANT CHANGE UP (ref 22–30)
CO2 BLDV-SCNC: 28 MMOL/L — SIGNIFICANT CHANGE UP (ref 22–30)
CO2 SERPL-SCNC: 23 MMOL/L — SIGNIFICANT CHANGE UP (ref 22–31)
CREAT SERPL-MCNC: 0.74 MG/DL — SIGNIFICANT CHANGE UP (ref 0.5–1.3)
EOSINOPHIL # BLD AUTO: 0 K/UL — SIGNIFICANT CHANGE UP (ref 0–0.5)
EOSINOPHIL NFR BLD AUTO: 0.5 % — SIGNIFICANT CHANGE UP (ref 0–6)
GAS PNL BLDV: 135 MMOL/L — LOW (ref 136–145)
GAS PNL BLDV: 137 MMOL/L — SIGNIFICANT CHANGE UP (ref 136–145)
GAS PNL BLDV: SIGNIFICANT CHANGE UP
GLUCOSE BLDV-MCNC: 102 MG/DL — HIGH (ref 70–99)
GLUCOSE BLDV-MCNC: 93 MG/DL — SIGNIFICANT CHANGE UP (ref 70–99)
GLUCOSE SERPL-MCNC: 108 MG/DL — HIGH (ref 70–99)
HCO3 BLDV-SCNC: 25 MMOL/L — SIGNIFICANT CHANGE UP (ref 21–29)
HCO3 BLDV-SCNC: 27 MMOL/L — SIGNIFICANT CHANGE UP (ref 21–29)
HCT VFR BLD CALC: 52.1 % — HIGH (ref 39–50)
HCT VFR BLDA CALC: 47 % — SIGNIFICANT CHANGE UP (ref 39–50)
HCT VFR BLDA CALC: 64 % — HIGH (ref 39–50)
HGB BLD CALC-MCNC: 15.4 G/DL — SIGNIFICANT CHANGE UP (ref 13–17)
HGB BLD CALC-MCNC: 20.9 G/DL — CRITICAL HIGH (ref 13–17)
HGB BLD-MCNC: 18.5 G/DL — HIGH (ref 13–17)
INR BLD: 1.09 RATIO — SIGNIFICANT CHANGE UP (ref 0.88–1.16)
LACTATE BLDV-MCNC: 1.1 MMOL/L — SIGNIFICANT CHANGE UP (ref 0.7–2)
LACTATE BLDV-MCNC: 2.9 MMOL/L — HIGH (ref 0.7–2)
LIDOCAIN IGE QN: 258 U/L — HIGH (ref 7–60)
LYMPHOCYTES # BLD AUTO: 1.1 K/UL — SIGNIFICANT CHANGE UP (ref 1–3.3)
LYMPHOCYTES # BLD AUTO: 12.9 % — LOW (ref 13–44)
MCHC RBC-ENTMCNC: 33.3 PG — SIGNIFICANT CHANGE UP (ref 27–34)
MCHC RBC-ENTMCNC: 35.6 GM/DL — SIGNIFICANT CHANGE UP (ref 32–36)
MCV RBC AUTO: 93.5 FL — SIGNIFICANT CHANGE UP (ref 80–100)
MONOCYTES # BLD AUTO: 0.7 K/UL — SIGNIFICANT CHANGE UP (ref 0–0.9)
MONOCYTES NFR BLD AUTO: 8.4 % — SIGNIFICANT CHANGE UP (ref 2–14)
NEUTROPHILS # BLD AUTO: 6.4 K/UL — SIGNIFICANT CHANGE UP (ref 1.8–7.4)
NEUTROPHILS NFR BLD AUTO: 77.3 % — HIGH (ref 43–77)
PCO2 BLDV: 42 MMHG — SIGNIFICANT CHANGE UP (ref 35–50)
PCO2 BLDV: 45 MMHG — SIGNIFICANT CHANGE UP (ref 35–50)
PH BLDV: 7.37 — SIGNIFICANT CHANGE UP (ref 7.35–7.45)
PH BLDV: 7.43 — SIGNIFICANT CHANGE UP (ref 7.35–7.45)
PLATELET # BLD AUTO: 162 K/UL — SIGNIFICANT CHANGE UP (ref 150–400)
PO2 BLDV: 32 MMHG — SIGNIFICANT CHANGE UP (ref 25–45)
PO2 BLDV: 52 MMHG — HIGH (ref 25–45)
POTASSIUM BLDV-SCNC: 4.3 MMOL/L — SIGNIFICANT CHANGE UP (ref 3.5–5.3)
POTASSIUM BLDV-SCNC: 4.4 MMOL/L — SIGNIFICANT CHANGE UP (ref 3.5–5.3)
POTASSIUM SERPL-MCNC: 4 MMOL/L — SIGNIFICANT CHANGE UP (ref 3.5–5.3)
POTASSIUM SERPL-SCNC: 4 MMOL/L — SIGNIFICANT CHANGE UP (ref 3.5–5.3)
PROT SERPL-MCNC: 8.8 G/DL — HIGH (ref 6–8.3)
PROTHROM AB SERPL-ACNC: 11.8 SEC — SIGNIFICANT CHANGE UP (ref 9.8–12.7)
RBC # BLD: 5.58 M/UL — SIGNIFICANT CHANGE UP (ref 4.2–5.8)
RBC # FLD: 12.7 % — SIGNIFICANT CHANGE UP (ref 10.3–14.5)
SAO2 % BLDV: 52 % — LOW (ref 67–88)
SAO2 % BLDV: 85 % — SIGNIFICANT CHANGE UP (ref 67–88)
SODIUM SERPL-SCNC: 136 MMOL/L — SIGNIFICANT CHANGE UP (ref 135–145)
WBC # BLD: 8.3 K/UL — SIGNIFICANT CHANGE UP (ref 3.8–10.5)
WBC # FLD AUTO: 8.3 K/UL — SIGNIFICANT CHANGE UP (ref 3.8–10.5)

## 2018-08-17 PROCEDURE — 73630 X-RAY EXAM OF FOOT: CPT | Mod: 26,LT

## 2018-08-17 PROCEDURE — 99285 EMERGENCY DEPT VISIT HI MDM: CPT

## 2018-08-17 PROCEDURE — 74177 CT ABD & PELVIS W/CONTRAST: CPT | Mod: 26

## 2018-08-17 RX ORDER — SODIUM CHLORIDE 9 MG/ML
1000 INJECTION INTRAMUSCULAR; INTRAVENOUS; SUBCUTANEOUS ONCE
Qty: 0 | Refills: 0 | Status: COMPLETED | OUTPATIENT
Start: 2018-08-17 | End: 2018-08-17

## 2018-08-17 RX ORDER — FOLIC ACID 0.8 MG
1 TABLET ORAL DAILY
Qty: 0 | Refills: 0 | Status: DISCONTINUED | OUTPATIENT
Start: 2018-08-17 | End: 2018-08-21

## 2018-08-17 RX ORDER — ONDANSETRON 8 MG/1
4 TABLET, FILM COATED ORAL EVERY 8 HOURS
Qty: 0 | Refills: 0 | Status: DISCONTINUED | OUTPATIENT
Start: 2018-08-17 | End: 2018-08-27

## 2018-08-17 RX ORDER — SODIUM CHLORIDE 9 MG/ML
1800 INJECTION, SOLUTION INTRAVENOUS
Qty: 0 | Refills: 0 | Status: DISCONTINUED | OUTPATIENT
Start: 2018-08-17 | End: 2018-08-18

## 2018-08-17 RX ORDER — HYDROMORPHONE HYDROCHLORIDE 2 MG/ML
2 INJECTION INTRAMUSCULAR; INTRAVENOUS; SUBCUTANEOUS EVERY 4 HOURS
Qty: 0 | Refills: 0 | Status: DISCONTINUED | OUTPATIENT
Start: 2018-08-17 | End: 2018-08-18

## 2018-08-17 RX ORDER — HYDRALAZINE HCL 50 MG
5 TABLET ORAL ONCE
Qty: 0 | Refills: 0 | Status: COMPLETED | OUTPATIENT
Start: 2018-08-17 | End: 2018-08-17

## 2018-08-17 RX ORDER — METOPROLOL TARTRATE 50 MG
50 TABLET ORAL THREE TIMES A DAY
Qty: 0 | Refills: 0 | Status: DISCONTINUED | OUTPATIENT
Start: 2018-08-17 | End: 2018-08-20

## 2018-08-17 RX ORDER — MAGNESIUM OXIDE 400 MG ORAL TABLET 241.3 MG
200 TABLET ORAL
Qty: 0 | Refills: 0 | Status: DISCONTINUED | OUTPATIENT
Start: 2018-08-17 | End: 2018-08-31

## 2018-08-17 RX ORDER — ONDANSETRON 8 MG/1
4 TABLET, FILM COATED ORAL ONCE
Qty: 0 | Refills: 0 | Status: DISCONTINUED | OUTPATIENT
Start: 2018-08-17 | End: 2018-08-17

## 2018-08-17 RX ORDER — PANTOPRAZOLE SODIUM 20 MG/1
40 TABLET, DELAYED RELEASE ORAL
Qty: 0 | Refills: 0 | Status: DISCONTINUED | OUTPATIENT
Start: 2018-08-17 | End: 2018-08-26

## 2018-08-17 RX ORDER — ONDANSETRON 8 MG/1
4 TABLET, FILM COATED ORAL ONCE
Qty: 0 | Refills: 0 | Status: COMPLETED | OUTPATIENT
Start: 2018-08-17 | End: 2018-08-17

## 2018-08-17 RX ORDER — HYDROMORPHONE HYDROCHLORIDE 2 MG/ML
0.5 INJECTION INTRAMUSCULAR; INTRAVENOUS; SUBCUTANEOUS ONCE
Qty: 0 | Refills: 0 | Status: DISCONTINUED | OUTPATIENT
Start: 2018-08-17 | End: 2018-08-17

## 2018-08-17 RX ORDER — ACETAMINOPHEN 500 MG
1000 TABLET ORAL ONCE
Qty: 0 | Refills: 0 | Status: COMPLETED | OUTPATIENT
Start: 2018-08-17 | End: 2018-08-17

## 2018-08-17 RX ORDER — FAMOTIDINE 10 MG/ML
20 INJECTION INTRAVENOUS ONCE
Qty: 0 | Refills: 0 | Status: COMPLETED | OUTPATIENT
Start: 2018-08-17 | End: 2018-08-17

## 2018-08-17 RX ORDER — SODIUM CHLORIDE 9 MG/ML
1000 INJECTION, SOLUTION INTRAVENOUS
Qty: 0 | Refills: 0 | Status: DISCONTINUED | OUTPATIENT
Start: 2018-08-17 | End: 2018-08-20

## 2018-08-17 RX ADMIN — ONDANSETRON 4 MILLIGRAM(S): 8 TABLET, FILM COATED ORAL at 16:29

## 2018-08-17 RX ADMIN — SODIUM CHLORIDE 1333.33 MILLILITER(S): 9 INJECTION INTRAMUSCULAR; INTRAVENOUS; SUBCUTANEOUS at 17:24

## 2018-08-17 RX ADMIN — Medication 400 MILLIGRAM(S): at 16:29

## 2018-08-17 RX ADMIN — SODIUM CHLORIDE 100 MILLILITER(S): 9 INJECTION, SOLUTION INTRAVENOUS at 23:06

## 2018-08-17 RX ADMIN — SODIUM CHLORIDE 100 MILLILITER(S): 9 INJECTION, SOLUTION INTRAVENOUS at 23:04

## 2018-08-17 RX ADMIN — HYDROMORPHONE HYDROCHLORIDE 2 MILLIGRAM(S): 2 INJECTION INTRAMUSCULAR; INTRAVENOUS; SUBCUTANEOUS at 23:15

## 2018-08-17 RX ADMIN — MAGNESIUM OXIDE 400 MG ORAL TABLET 200 MILLIGRAM(S): 241.3 TABLET ORAL at 23:06

## 2018-08-17 RX ADMIN — HYDROMORPHONE HYDROCHLORIDE 0.5 MILLIGRAM(S): 2 INJECTION INTRAMUSCULAR; INTRAVENOUS; SUBCUTANEOUS at 20:20

## 2018-08-17 RX ADMIN — HYDROMORPHONE HYDROCHLORIDE 2 MILLIGRAM(S): 2 INJECTION INTRAMUSCULAR; INTRAVENOUS; SUBCUTANEOUS at 21:37

## 2018-08-17 RX ADMIN — Medication 5 MILLIGRAM(S): at 21:37

## 2018-08-17 RX ADMIN — SODIUM CHLORIDE 150 MILLILITER(S): 9 INJECTION, SOLUTION INTRAVENOUS at 21:28

## 2018-08-17 RX ADMIN — Medication 50 MILLIGRAM(S): at 21:37

## 2018-08-17 RX ADMIN — Medication 1000 MILLIGRAM(S): at 17:24

## 2018-08-17 RX ADMIN — SODIUM CHLORIDE 1333.33 MILLILITER(S): 9 INJECTION INTRAMUSCULAR; INTRAVENOUS; SUBCUTANEOUS at 16:29

## 2018-08-17 RX ADMIN — HYDROMORPHONE HYDROCHLORIDE 0.5 MILLIGRAM(S): 2 INJECTION INTRAMUSCULAR; INTRAVENOUS; SUBCUTANEOUS at 20:02

## 2018-08-17 RX ADMIN — HYDROMORPHONE HYDROCHLORIDE 0.5 MILLIGRAM(S): 2 INJECTION INTRAMUSCULAR; INTRAVENOUS; SUBCUTANEOUS at 17:25

## 2018-08-17 RX ADMIN — HYDROMORPHONE HYDROCHLORIDE 0.5 MILLIGRAM(S): 2 INJECTION INTRAMUSCULAR; INTRAVENOUS; SUBCUTANEOUS at 17:26

## 2018-08-17 RX ADMIN — FAMOTIDINE 20 MILLIGRAM(S): 10 INJECTION INTRAVENOUS at 17:23

## 2018-08-17 NOTE — H&P ADULT - FAMILY HISTORY
Family history of hypertension     Family history of alcoholism, father     Father  Still living? Unknown  Family history of lymphoma, Age at diagnosis: Age Unknown

## 2018-08-17 NOTE — H&P ADULT - PMH
Alcohol abuse    Alcohol-induced acute pancreatitis without infection or necrosis    HTN (hypertension)

## 2018-08-17 NOTE — H&P ADULT - NSHPREVIEWOFSYSTEMS_GEN_ALL_CORE
REVIEW OF SYSTEM:  CONSTITUTIONAL: No fever, No change in weight, No fatigue  HEAD: No headache, No dizziness, No recent trauma  EYES: No eye pain, No visual disturbances, No discharge  ENT:  No difficulty hearing, No tinnitus, No vertigo, No sinus pain, No throat pain  NECK: No pain, No stiffness  BREASTS: No pain, No masses, No nipple discharge  RESPIRATORY: No cough, No wheezing, No chills, No hemoptysis, No shortness of breath at rest or exertional shortness of breath  CARDIOVASCULAR: No chest pain, No palpitations, No dizziness, No CHF, No arrhythmia, No cardiomegaly, No leg swelling  GASTROINTESTINAL: (+) abdominal and  epigastric pain.  (+)nausea / vomiting, No hematemesis, No diarrhea, No constipation. No melena, No hematochezia. No GERD  GENITOURINARY: No dysuria, No frequency, No hematuria, No incontinence, No nocturia, No hesitancy,  SKIN: No itching, No burning, No rashes, No lesions   LYMPH NODES: No history of enlarged glands  ENDOCRINE: No heat or cold intolerance, No hair loss. No osteoporosis, No thyroid disease  MUSCULOSKELETAL: No joint pain or swelling, No muscle, back, or extremity pain  PSYCHIATRIC: (+) Depression / mood swings, No difficulty sleeping  HEME/LYMPH: No easy bruising, No anticoagulants, No bleeding disorder, No bleeding gums  ALLERGY AND IMMUNOLOGIC: No hives, No eczema  NEUROLOGICAL: No memory loss, No loss of strength, No numbness, No tremors

## 2018-08-17 NOTE — H&P ADULT - PROBLEM SELECTOR PLAN 3
blood  fairly well controlled blood  fairly well controlled renew old BP med Metoprolol 50 mg po tid

## 2018-08-17 NOTE — H&P ADULT - PROBLEM SELECTOR PLAN 1
IV hydration  Pain control IV hydration -D5 ns at 125 c /hr then down to 100cc/hr  Pain control  Dilaudid 2 mg ivss q 4 hours  GI consult Dr Yu

## 2018-08-17 NOTE — ED PROVIDER NOTE - PHYSICAL EXAMINATION
Gen: NAD, non-toxic, conversational  Eyes: PERRLA, EOMI   HENT: Normocephalic, atraumatic. External ears normal, no rhinorrhea, moist mucous membranes.   CV: tachycardic with regular rhythm, no M/R/G  Resp: CTAB, non-labored, speaking without difficulty on room air  Abd: soft, diffusely tender in the upper quadrants and right lower quadrant, non rigid, no rebound tenderness  Back: No CVAT bilaterally, no midline ttp  Skin: moist, wwp, large well healed midline abdominal laparotomy scar, left volar forearm with fasciotomy scar  Neuro: AOx3, speech is fluent and appropriate  Psych: Mood concerned, affect euthymic

## 2018-08-17 NOTE — ED ADULT NURSE REASSESSMENT NOTE - NS ED NURSE REASSESS COMMENT FT1
alert and orientedx3 Resp even and nonlab No tremors of hand denies auditory or visual hallucinations Denies feeling anxious.
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
## 2018-08-17 NOTE — ED ADULT TRIAGE NOTE - CHIEF COMPLAINT QUOTE
Pt. reports x1 day abdominal pain. Reports history of pancreatitis, states "this feels exactly like when I've had pancreatitis in the past."

## 2018-08-17 NOTE — H&P ADULT - NSHPLABSRESULTS_GEN_ALL_CORE
CBC Full  -  ( 17 Aug 2018 16:38 )  WBC Count : 8.3 K/uL  Hemoglobin : 18.5 g/dL  Hematocrit : 52.1 %  Platelet Count - Automated : 162 K/uL  Mean Cell Volume : 93.5 fl  Mean Cell Hemoglobin : 33.3 pg  Mean Cell Hemoglobin Concentration : 35.6 gm/dL  Auto Neutrophil # : 6.4 K/uL  Auto Lymphocyte # : 1.1 K/uL  Auto Monocyte # : 0.7 K/uL  Auto Eosinophil # : 0.0 K/uL  Auto Basophil # : 0.1 K/uL  Auto Neutrophil % : 77.3 %  Auto Lymphocyte % : 12.9 %  Auto Monocyte % : 8.4 %  Auto Eosinophil % : 0.5 %  Auto Basophil % : 0.9 %    08-17    136  |  91<L>  |  19  ----------------------------<  108<H>  4.0   |  23  |  0.74    Ca    11.1<H>      17 Aug 2018 16:38    TPro  8.8<H>  /  Alb  5.8<H>  /  TBili  1.4<H>  /  DBili  x   /  AST  48<H>  /  ALT  45  /  AlkPhos  45  08-17    LIVER FUNCTIONS - ( 17 Aug 2018 16:38 )  Alb: 5.8 g/dL / Pro: 8.8 g/dL / ALK PHOS: 45 U/L / ALT: 45 U/L / AST: 48 U/L / GGT: x           PT/INR - ( 17 Aug 2018 16:38 )   PT: 11.8 sec;   INR: 1.09 ratio         PTT - ( 17 Aug 2018 16:38 )  PTT:30.3 sec
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
## 2018-08-17 NOTE — ED ADULT NURSE NOTE - OBJECTIVE STATEMENT
38 yr old male male amb to ed c/o abd pain epigastric h/o pancreatitis Feels the same per pt. Chronic alcohol abuse Poor appetitex3 days. Denies diarrhea. 38 yr old male male amb to ed c/o abd pain epigastric h/o pancreatitis Feels the same per pt. Chronic alcohol abuse Poor appetitex3 days. c/o nausea No vomit Had diarrhea last drink Wednesday Denies tremors or feeling 'anxious" at this time .Mother at bedside. Has been hospitalized x2 for pancreatitis last year.

## 2018-08-17 NOTE — H&P ADULT - NSHPPHYSICALEXAM_GEN_ALL_CORE
· Physical Examination: Gen: NAD, non-toxic, conversational  	Eyes: PERRLA, EOMI   	HENT: Normocephalic, atraumatic. External ears normal, no rhinorrhea, moist mucous membranes.   	CV: tachycardic with regular rhythm, no M/R/G  	Resp: CTAB, non-labored, speaking without difficulty on room air  	Abd: soft, diffusely tender in the upper quadrants and right lower quadrant, non rigid, no rebound tenderness  	Back: No CVAT bilaterally, no midline ttp  	Skin: moist, wwp, large well healed midline abdominal laparotomy scar, left volar forearm with fasciotomy scar

## 2018-08-18 LAB
ALBUMIN SERPL ELPH-MCNC: 5 G/DL — SIGNIFICANT CHANGE UP (ref 3.3–5)
ALP SERPL-CCNC: 37 U/L — LOW (ref 40–120)
ALT FLD-CCNC: 33 U/L — SIGNIFICANT CHANGE UP (ref 10–45)
ANION GAP SERPL CALC-SCNC: 13 MMOL/L — SIGNIFICANT CHANGE UP (ref 5–17)
APPEARANCE UR: CLEAR — SIGNIFICANT CHANGE UP
AST SERPL-CCNC: 32 U/L — SIGNIFICANT CHANGE UP (ref 10–40)
BILIRUB DIRECT SERPL-MCNC: 0.3 MG/DL — HIGH (ref 0–0.2)
BILIRUB INDIRECT FLD-MCNC: 0.8 MG/DL — SIGNIFICANT CHANGE UP (ref 0.2–1)
BILIRUB SERPL-MCNC: 1.1 MG/DL — SIGNIFICANT CHANGE UP (ref 0.2–1.2)
BILIRUB UR-MCNC: NEGATIVE — SIGNIFICANT CHANGE UP
BUN SERPL-MCNC: 15 MG/DL — SIGNIFICANT CHANGE UP (ref 7–23)
CALCIUM SERPL-MCNC: 9.5 MG/DL — SIGNIFICANT CHANGE UP (ref 8.4–10.5)
CHLORIDE SERPL-SCNC: 97 MMOL/L — SIGNIFICANT CHANGE UP (ref 96–108)
CO2 SERPL-SCNC: 26 MMOL/L — SIGNIFICANT CHANGE UP (ref 22–31)
COLOR SPEC: YELLOW — SIGNIFICANT CHANGE UP
CREAT SERPL-MCNC: 0.66 MG/DL — SIGNIFICANT CHANGE UP (ref 0.5–1.3)
DIFF PNL FLD: NEGATIVE — SIGNIFICANT CHANGE UP
GLUCOSE SERPL-MCNC: 131 MG/DL — HIGH (ref 70–99)
GLUCOSE UR QL: NEGATIVE — SIGNIFICANT CHANGE UP
KETONES UR-MCNC: ABNORMAL
LEUKOCYTE ESTERASE UR-ACNC: NEGATIVE — SIGNIFICANT CHANGE UP
LIDOCAIN IGE QN: 170 U/L — HIGH (ref 7–60)
MAGNESIUM SERPL-MCNC: 1.8 MG/DL — SIGNIFICANT CHANGE UP (ref 1.6–2.6)
NITRITE UR-MCNC: NEGATIVE — SIGNIFICANT CHANGE UP
PH UR: 6 — SIGNIFICANT CHANGE UP (ref 5–8)
PHOSPHATE SERPL-MCNC: 2.6 MG/DL — SIGNIFICANT CHANGE UP (ref 2.5–4.5)
POTASSIUM SERPL-MCNC: 3.9 MMOL/L — SIGNIFICANT CHANGE UP (ref 3.5–5.3)
POTASSIUM SERPL-SCNC: 3.9 MMOL/L — SIGNIFICANT CHANGE UP (ref 3.5–5.3)
PROT SERPL-MCNC: 7.4 G/DL — SIGNIFICANT CHANGE UP (ref 6–8.3)
PROT UR-MCNC: SIGNIFICANT CHANGE UP
RBC CASTS # UR COMP ASSIST: SIGNIFICANT CHANGE UP /HPF (ref 0–2)
SODIUM SERPL-SCNC: 136 MMOL/L — SIGNIFICANT CHANGE UP (ref 135–145)
SP GR SPEC: >1.03 — HIGH (ref 1.01–1.02)
UROBILINOGEN FLD QL: NEGATIVE — SIGNIFICANT CHANGE UP

## 2018-08-18 PROCEDURE — 99232 SBSQ HOSP IP/OBS MODERATE 35: CPT

## 2018-08-18 RX ORDER — HYDROMORPHONE HYDROCHLORIDE 2 MG/ML
1 INJECTION INTRAMUSCULAR; INTRAVENOUS; SUBCUTANEOUS EVERY 4 HOURS
Qty: 0 | Refills: 0 | Status: DISCONTINUED | OUTPATIENT
Start: 2018-08-18 | End: 2018-08-21

## 2018-08-18 RX ORDER — THIAMINE MONONITRATE (VIT B1) 100 MG
500 TABLET ORAL THREE TIMES A DAY
Qty: 0 | Refills: 0 | Status: COMPLETED | OUTPATIENT
Start: 2018-08-18 | End: 2018-08-21

## 2018-08-18 RX ORDER — HYDRALAZINE HCL 50 MG
10 TABLET ORAL ONCE
Qty: 0 | Refills: 0 | Status: COMPLETED | OUTPATIENT
Start: 2018-08-18 | End: 2018-08-18

## 2018-08-18 RX ORDER — SODIUM CHLORIDE 9 MG/ML
1000 INJECTION, SOLUTION INTRAVENOUS
Qty: 0 | Refills: 0 | Status: DISCONTINUED | OUTPATIENT
Start: 2018-08-18 | End: 2018-08-20

## 2018-08-18 RX ORDER — HYDROMORPHONE HYDROCHLORIDE 2 MG/ML
1 INJECTION INTRAMUSCULAR; INTRAVENOUS; SUBCUTANEOUS ONCE
Qty: 0 | Refills: 0 | Status: DISCONTINUED | OUTPATIENT
Start: 2018-08-18 | End: 2018-08-18

## 2018-08-18 RX ADMIN — PANTOPRAZOLE SODIUM 40 MILLIGRAM(S): 20 TABLET, DELAYED RELEASE ORAL at 17:15

## 2018-08-18 RX ADMIN — Medication 50 MILLIGRAM(S): at 13:03

## 2018-08-18 RX ADMIN — HYDROMORPHONE HYDROCHLORIDE 1 MILLIGRAM(S): 2 INJECTION INTRAMUSCULAR; INTRAVENOUS; SUBCUTANEOUS at 17:15

## 2018-08-18 RX ADMIN — ONDANSETRON 4 MILLIGRAM(S): 8 TABLET, FILM COATED ORAL at 12:31

## 2018-08-18 RX ADMIN — MAGNESIUM OXIDE 400 MG ORAL TABLET 200 MILLIGRAM(S): 241.3 TABLET ORAL at 08:01

## 2018-08-18 RX ADMIN — SODIUM CHLORIDE 100 MILLILITER(S): 9 INJECTION, SOLUTION INTRAVENOUS at 08:01

## 2018-08-18 RX ADMIN — Medication 50 MILLIGRAM(S): at 21:18

## 2018-08-18 RX ADMIN — HYDROMORPHONE HYDROCHLORIDE 1 MILLIGRAM(S): 2 INJECTION INTRAMUSCULAR; INTRAVENOUS; SUBCUTANEOUS at 09:07

## 2018-08-18 RX ADMIN — Medication 10 MILLIGRAM(S): at 02:22

## 2018-08-18 RX ADMIN — Medication 50 MILLIGRAM(S): at 05:25

## 2018-08-18 RX ADMIN — Medication 1 TABLET(S): at 10:37

## 2018-08-18 RX ADMIN — SODIUM CHLORIDE 100 MILLILITER(S): 9 INJECTION, SOLUTION INTRAVENOUS at 10:37

## 2018-08-18 RX ADMIN — Medication 2 MILLIGRAM(S): at 17:57

## 2018-08-18 RX ADMIN — HYDROMORPHONE HYDROCHLORIDE 2 MILLIGRAM(S): 2 INJECTION INTRAMUSCULAR; INTRAVENOUS; SUBCUTANEOUS at 05:23

## 2018-08-18 RX ADMIN — HYDROMORPHONE HYDROCHLORIDE 1 MILLIGRAM(S): 2 INJECTION INTRAMUSCULAR; INTRAVENOUS; SUBCUTANEOUS at 01:00

## 2018-08-18 RX ADMIN — HYDROMORPHONE HYDROCHLORIDE 2 MILLIGRAM(S): 2 INJECTION INTRAMUSCULAR; INTRAVENOUS; SUBCUTANEOUS at 02:20

## 2018-08-18 RX ADMIN — HYDROMORPHONE HYDROCHLORIDE 1 MILLIGRAM(S): 2 INJECTION INTRAMUSCULAR; INTRAVENOUS; SUBCUTANEOUS at 13:54

## 2018-08-18 RX ADMIN — HYDROMORPHONE HYDROCHLORIDE 1 MILLIGRAM(S): 2 INJECTION INTRAMUSCULAR; INTRAVENOUS; SUBCUTANEOUS at 13:06

## 2018-08-18 RX ADMIN — Medication 1 MILLIGRAM(S): at 10:37

## 2018-08-18 RX ADMIN — PANTOPRAZOLE SODIUM 40 MILLIGRAM(S): 20 TABLET, DELAYED RELEASE ORAL at 05:26

## 2018-08-18 RX ADMIN — MAGNESIUM OXIDE 400 MG ORAL TABLET 200 MILLIGRAM(S): 241.3 TABLET ORAL at 17:16

## 2018-08-18 RX ADMIN — Medication 2 MILLIGRAM(S): at 17:46

## 2018-08-18 RX ADMIN — HYDROMORPHONE HYDROCHLORIDE 1 MILLIGRAM(S): 2 INJECTION INTRAMUSCULAR; INTRAVENOUS; SUBCUTANEOUS at 00:40

## 2018-08-18 RX ADMIN — Medication 75 MILLIGRAM(S): at 18:35

## 2018-08-18 RX ADMIN — HYDROMORPHONE HYDROCHLORIDE 1 MILLIGRAM(S): 2 INJECTION INTRAMUSCULAR; INTRAVENOUS; SUBCUTANEOUS at 10:18

## 2018-08-18 RX ADMIN — HYDROMORPHONE HYDROCHLORIDE 2 MILLIGRAM(S): 2 INJECTION INTRAMUSCULAR; INTRAVENOUS; SUBCUTANEOUS at 03:44

## 2018-08-18 RX ADMIN — HYDROMORPHONE HYDROCHLORIDE 1 MILLIGRAM(S): 2 INJECTION INTRAMUSCULAR; INTRAVENOUS; SUBCUTANEOUS at 21:17
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
## 2018-08-18 NOTE — BEHAVIORAL HEALTH ASSESSMENT NOTE - SUMMARY
Patient is a 38 yr old  male, with chronic alcohol abuse, last drink 2 days ago, admitted to medicine service for acute pancreatitis. He was unwilling/ unable to provide history, was unreliable
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
## 2018-08-18 NOTE — BEHAVIORAL HEALTH ASSESSMENT NOTE - HPI (INCLUDE ILLNESS QUALITY, SEVERITY, DURATION, TIMING, CONTEXT, MODIFYING FACTORS, ASSOCIATED SIGNS AND SYMPTOMS)
38 year old single, unemployed  male was admitted to medicine service for acute pancreatitis. Patient has h/o chronic alcohol abuse. Around 6 pm today, code flight was called as patient was agitated and wanted to elope from the hospital. He was medicated with Ativan and transferred to a private room.  At the time of evaluation, patient initially refused interview with the writer, stating that he would "rather not" talk to a psychiatrist. On the second attempt, he was more receptive to the interview. During evaluation, patient was oriented to time and person, however believed he was at home. He was unable to provide most information. Patient reported that he had been in the hospital for 2 months, however on review of chart he had only been admitted 2 days prior. He also made several inappropriate comments during the interview. Per chart, he has long history of alcohol use, however he reported to the writer that his last drink was 2 months ago and he was "about to go get a drink now". He denies currently feeling suicidal/ homicidal, denies auditory/ visual hallucinations, he did exhibit flight of ideas, racing thoughts, easy distractibility. He denies manic or psychotic symptoms however is an unreliable historian. At the time of evaluation, patient did not exhibit tremors, sweating, did not endorse palpitations or other signs of alcohol withdrawal.  He denies previous inpatient psychiatric hospitalizations or suicide attempts.
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
## 2018-08-19 DIAGNOSIS — F10.239 ALCOHOL DEPENDENCE WITH WITHDRAWAL, UNSPECIFIED: ICD-10-CM

## 2018-08-19 DIAGNOSIS — F10.231 ALCOHOL DEPENDENCE WITH WITHDRAWAL DELIRIUM: ICD-10-CM

## 2018-08-19 LAB
ALBUMIN SERPL ELPH-MCNC: 4.6 G/DL — SIGNIFICANT CHANGE UP (ref 3.3–5)
ALP SERPL-CCNC: 35 U/L — LOW (ref 40–120)
ALT FLD-CCNC: 32 U/L — SIGNIFICANT CHANGE UP (ref 10–45)
ANION GAP SERPL CALC-SCNC: 15 MMOL/L — SIGNIFICANT CHANGE UP (ref 5–17)
APTT BLD: 29.6 SEC — SIGNIFICANT CHANGE UP (ref 27.5–37.4)
AST SERPL-CCNC: 41 U/L — HIGH (ref 10–40)
BILIRUB SERPL-MCNC: 1.3 MG/DL — HIGH (ref 0.2–1.2)
BUN SERPL-MCNC: 9 MG/DL — SIGNIFICANT CHANGE UP (ref 7–23)
CALCIUM SERPL-MCNC: 9.3 MG/DL — SIGNIFICANT CHANGE UP (ref 8.4–10.5)
CHLORIDE SERPL-SCNC: 100 MMOL/L — SIGNIFICANT CHANGE UP (ref 96–108)
CK SERPL-CCNC: 495 U/L — HIGH (ref 30–200)
CO2 SERPL-SCNC: 22 MMOL/L — SIGNIFICANT CHANGE UP (ref 22–31)
CREAT SERPL-MCNC: 0.58 MG/DL — SIGNIFICANT CHANGE UP (ref 0.5–1.3)
GLUCOSE BLDC GLUCOMTR-MCNC: 148 MG/DL — HIGH (ref 70–99)
GLUCOSE BLDC GLUCOMTR-MCNC: 82 MG/DL — SIGNIFICANT CHANGE UP (ref 70–99)
GLUCOSE SERPL-MCNC: 100 MG/DL — HIGH (ref 70–99)
HCT VFR BLD CALC: 39.2 % — SIGNIFICANT CHANGE UP (ref 39–50)
HGB BLD-MCNC: 13.8 G/DL — SIGNIFICANT CHANGE UP (ref 13–17)
INR BLD: 1.15 RATIO — SIGNIFICANT CHANGE UP (ref 0.88–1.16)
MAGNESIUM SERPL-MCNC: 1.8 MG/DL — SIGNIFICANT CHANGE UP (ref 1.6–2.6)
MCHC RBC-ENTMCNC: 33.1 PG — SIGNIFICANT CHANGE UP (ref 27–34)
MCHC RBC-ENTMCNC: 35.2 GM/DL — SIGNIFICANT CHANGE UP (ref 32–36)
MCV RBC AUTO: 94.1 FL — SIGNIFICANT CHANGE UP (ref 80–100)
PCP SPEC-MCNC: SIGNIFICANT CHANGE UP
PHOSPHATE SERPL-MCNC: 2.5 MG/DL — SIGNIFICANT CHANGE UP (ref 2.5–4.5)
PLATELET # BLD AUTO: 96 K/UL — LOW (ref 150–400)
POTASSIUM SERPL-MCNC: 3.5 MMOL/L — SIGNIFICANT CHANGE UP (ref 3.5–5.3)
POTASSIUM SERPL-SCNC: 3.5 MMOL/L — SIGNIFICANT CHANGE UP (ref 3.5–5.3)
PROT SERPL-MCNC: 6.9 G/DL — SIGNIFICANT CHANGE UP (ref 6–8.3)
PROTHROM AB SERPL-ACNC: 12.5 SEC — SIGNIFICANT CHANGE UP (ref 9.8–12.7)
RBC # BLD: 4.17 M/UL — LOW (ref 4.2–5.8)
RBC # FLD: 12.2 % — SIGNIFICANT CHANGE UP (ref 10.3–14.5)
SODIUM SERPL-SCNC: 137 MMOL/L — SIGNIFICANT CHANGE UP (ref 135–145)
WBC # BLD: 4.6 K/UL — SIGNIFICANT CHANGE UP (ref 3.8–10.5)
WBC # FLD AUTO: 4.6 K/UL — SIGNIFICANT CHANGE UP (ref 3.8–10.5)

## 2018-08-19 PROCEDURE — 99232 SBSQ HOSP IP/OBS MODERATE 35: CPT

## 2018-08-19 PROCEDURE — 99291 CRITICAL CARE FIRST HOUR: CPT

## 2018-08-19 PROCEDURE — 93010 ELECTROCARDIOGRAM REPORT: CPT

## 2018-08-19 RX ORDER — DEXMEDETOMIDINE HYDROCHLORIDE IN 0.9% SODIUM CHLORIDE 4 UG/ML
0.1 INJECTION INTRAVENOUS
Qty: 200 | Refills: 0 | Status: DISCONTINUED | OUTPATIENT
Start: 2018-08-19 | End: 2018-08-25

## 2018-08-19 RX ORDER — ENOXAPARIN SODIUM 100 MG/ML
40 INJECTION SUBCUTANEOUS EVERY 24 HOURS
Qty: 0 | Refills: 0 | Status: DISCONTINUED | OUTPATIENT
Start: 2018-08-19 | End: 2018-08-20

## 2018-08-19 RX ORDER — HALOPERIDOL DECANOATE 100 MG/ML
5 INJECTION INTRAMUSCULAR ONCE
Qty: 0 | Refills: 0 | Status: COMPLETED | OUTPATIENT
Start: 2018-08-19 | End: 2018-08-19

## 2018-08-19 RX ORDER — PHENOBARBITAL 60 MG
130 TABLET ORAL ONCE
Qty: 0 | Refills: 0 | Status: DISCONTINUED | OUTPATIENT
Start: 2018-08-19 | End: 2018-08-19

## 2018-08-19 RX ORDER — PHENOBARBITAL 60 MG
260 TABLET ORAL ONCE
Qty: 0 | Refills: 0 | Status: DISCONTINUED | OUTPATIENT
Start: 2018-08-19 | End: 2018-08-19

## 2018-08-19 RX ORDER — DEXTROSE 50 % IN WATER 50 %
25 SYRINGE (ML) INTRAVENOUS ONCE
Qty: 0 | Refills: 0 | Status: COMPLETED | OUTPATIENT
Start: 2018-08-19 | End: 2018-08-19

## 2018-08-19 RX ORDER — PHENOBARBITAL 60 MG
130 TABLET ORAL
Qty: 0 | Refills: 0 | Status: DISCONTINUED | OUTPATIENT
Start: 2018-08-19 | End: 2018-08-19

## 2018-08-19 RX ORDER — POTASSIUM PHOSPHATE, MONOBASIC POTASSIUM PHOSPHATE, DIBASIC 236; 224 MG/ML; MG/ML
15 INJECTION, SOLUTION INTRAVENOUS ONCE
Qty: 0 | Refills: 0 | Status: COMPLETED | OUTPATIENT
Start: 2018-08-19 | End: 2018-08-19

## 2018-08-19 RX ORDER — MAGNESIUM SULFATE 500 MG/ML
1 VIAL (ML) INJECTION ONCE
Qty: 0 | Refills: 0 | Status: COMPLETED | OUTPATIENT
Start: 2018-08-19 | End: 2018-08-19

## 2018-08-19 RX ORDER — PHENOBARBITAL 60 MG
130 TABLET ORAL
Qty: 0 | Refills: 0 | Status: DISCONTINUED | OUTPATIENT
Start: 2018-08-19 | End: 2018-08-22

## 2018-08-19 RX ORDER — POTASSIUM CHLORIDE 20 MEQ
10 PACKET (EA) ORAL
Qty: 0 | Refills: 0 | Status: COMPLETED | OUTPATIENT
Start: 2018-08-19 | End: 2018-08-19

## 2018-08-19 RX ADMIN — Medication 75 MILLIGRAM(S): at 06:39

## 2018-08-19 RX ADMIN — Medication 100 MILLIEQUIVALENT(S): at 16:35

## 2018-08-19 RX ADMIN — HYDROMORPHONE HYDROCHLORIDE 1 MILLIGRAM(S): 2 INJECTION INTRAMUSCULAR; INTRAVENOUS; SUBCUTANEOUS at 06:39

## 2018-08-19 RX ADMIN — HYDROMORPHONE HYDROCHLORIDE 1 MILLIGRAM(S): 2 INJECTION INTRAMUSCULAR; INTRAVENOUS; SUBCUTANEOUS at 07:38

## 2018-08-19 RX ADMIN — PANTOPRAZOLE SODIUM 40 MILLIGRAM(S): 20 TABLET, DELAYED RELEASE ORAL at 06:38

## 2018-08-19 RX ADMIN — SODIUM CHLORIDE 100 MILLILITER(S): 9 INJECTION, SOLUTION INTRAVENOUS at 15:13

## 2018-08-19 RX ADMIN — Medication 130 MILLIGRAM(S): at 15:21

## 2018-08-19 RX ADMIN — Medication 260 MILLIGRAM(S): at 08:20

## 2018-08-19 RX ADMIN — Medication 130 MILLIGRAM(S): at 08:30

## 2018-08-19 RX ADMIN — HYDROMORPHONE HYDROCHLORIDE 1 MILLIGRAM(S): 2 INJECTION INTRAMUSCULAR; INTRAVENOUS; SUBCUTANEOUS at 21:37

## 2018-08-19 RX ADMIN — Medication 105 MILLIGRAM(S): at 14:40

## 2018-08-19 RX ADMIN — Medication 50 MILLIGRAM(S): at 06:38

## 2018-08-19 RX ADMIN — PANTOPRAZOLE SODIUM 40 MILLIGRAM(S): 20 TABLET, DELAYED RELEASE ORAL at 17:16

## 2018-08-19 RX ADMIN — Medication 4 MILLIGRAM(S): at 06:37

## 2018-08-19 RX ADMIN — HALOPERIDOL DECANOATE 5 MILLIGRAM(S): 100 INJECTION INTRAMUSCULAR at 08:10

## 2018-08-19 RX ADMIN — Medication 130 MILLIGRAM(S): at 17:15

## 2018-08-19 RX ADMIN — Medication 100 GRAM(S): at 16:35

## 2018-08-19 RX ADMIN — Medication 105 MILLIGRAM(S): at 21:51

## 2018-08-19 RX ADMIN — DEXMEDETOMIDINE HYDROCHLORIDE IN 0.9% SODIUM CHLORIDE 1.81 MICROGRAM(S)/KG/HR: 4 INJECTION INTRAVENOUS at 12:02

## 2018-08-19 RX ADMIN — Medication 4 MILLIGRAM(S): at 08:10

## 2018-08-19 RX ADMIN — Medication 130 MILLIGRAM(S): at 17:24

## 2018-08-19 RX ADMIN — ENOXAPARIN SODIUM 40 MILLIGRAM(S): 100 INJECTION SUBCUTANEOUS at 12:02

## 2018-08-19 RX ADMIN — Medication 25 MILLILITER(S): at 18:05

## 2018-08-19 RX ADMIN — Medication 100 MILLIEQUIVALENT(S): at 20:00

## 2018-08-19 RX ADMIN — Medication 100 MILLIEQUIVALENT(S): at 19:00

## 2018-08-19 RX ADMIN — Medication 105 MILLIGRAM(S): at 06:38

## 2018-08-19 RX ADMIN — HYDROMORPHONE HYDROCHLORIDE 1 MILLIGRAM(S): 2 INJECTION INTRAMUSCULAR; INTRAVENOUS; SUBCUTANEOUS at 20:49

## 2018-08-19 RX ADMIN — POTASSIUM PHOSPHATE, MONOBASIC POTASSIUM PHOSPHATE, DIBASIC 62.5 MILLIMOLE(S): 236; 224 INJECTION, SOLUTION INTRAVENOUS at 16:35
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
## 2018-08-19 NOTE — PROGRESS NOTE BEHAVIORAL HEALTH - NSBHFUPINTERVALHXFT_PSY_A_CORE
Was paged at 8am about patient, who was very agitated. RRT called - MICU at bedside - pt received Haldol 5mg and Ativan 4mg at 0810hrs, was also given rounds of phenobarbital and moved to MICU. Now on Precedex - not interviewable.
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
## 2018-08-19 NOTE — CHART NOTE - NSCHARTNOTEFT_GEN_A_CORE
Second RRT called for agitation. Security in the room and patient disoriented, yelling and extremely agitated.    MICU called with fellow, resident, and MICU attending in the room within minutes. Patient held down by 6 security and medical staff members and given a total of 5 mg of IM Haldol, 4 IM/2 mg IV Ativan, and 4 rounds fo 130 mg IV phenobarbital. Patient restrained and plan for transfer to MICU. MICU resident to transport patient.   Plan discussed staff and MICU.     Bonilla Odom MD  MAR [id number].

## 2018-08-19 NOTE — CHART NOTE - NSCHARTNOTEFT_GEN_A_CORE
Code gray called by RN, pt seen in pt room with security guards in room. pt observed agitated, delirious and violent, walking around whole unit looking hor his room. Ativan 2 mg IV X1 given with security guards and PCA holding pt down for IV injection. MICU and Psych called. RRT called by RN. Haldol 5 mg IM, Ativan 2 mg IM, Phenobarb given by MICU and RRT team.   pt accepted by MICU. To be transferred to MICU  Dr. Choi made aware.     Carolin Melgar NP-C  #[id number] Code gray called by RN, pt seen in pt room with security guards in room. pt observed agitated, delirious and violent, walking around whole unit looking hor his room. Ativan 2 mg IV X1 given with security guards and PCA holding pt down for IV injection. MICU and Psych called. RRT called by RN. Haldol 5 mg IM, Ativan 2 mg IM, Phenobarb given by MICU and RRT team.   pt accepted by MICU. To be transferred to MICU  Dr. Choi made aware.     Carolin SHANEC  #[id number]    @ 08:45  pt transferred to MICU. Mother Kateryna ( [phone] ) called and aware, updated on current condition     Carolin SHANEC  #[id number]
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
## 2018-08-19 NOTE — CHART NOTE - NSCHARTNOTEFT_GEN_A_CORE
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 Ativan 8mg, haldol 5mg, phenobarb total 360mg, on precedex  - neuro checks q4  -Phenobarb PRN for agitation  -F/u utox  - banana bag qd  - cont MTV and folic acid    Respiratory - Breathing well on Ra  - monitor SpO2 continuously  - VSq4    CV - hx of HTN on metoprolol 50mg TID  - hold home metoprolol  - monitor VS q4  - restart BP med as necessary    GI: Acute pancreatitis  -NPO  -Fluids as needed      Heme: No acute issues  -Monitor CBCs      Renal: No acute issues  -Trend Cr  -Monitor electrolytes  -Strict I/O    Endo: No known history of DM  -FSG q6h, ISS  -F/u A1c    Ppx: DVT: Lovenox    MICU Attending Addendum    The patient was seen and examined. I agree with the resident history, assessment and plan with my changes below.    Severe ETOH withdrawl CIWA 22 with possible progression towards DT's  Acute ETOH replated pancreatitis  ETOH abuse    Admit to MICU  Haldol IV prn along precedex. The patient received phenobarbital during a rapid response, cautious use of benzodiazipines to prevent respiratory distress  Continue IV hydration , H/H appropriate diluted after hydration, no clinical evidence of significant third spacing  Early enterlal feeds as tolerated, currently too sedated to eat orally, make require an NG tube depending on withdrawl sx  Monitor and replace electrolytes  Continue thiamine/Folate  Seizure/aspiration precautions  DVT prophylaxis    30 minutes of critical care time was spent evaluating and managing this patient
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
## 2018-08-19 NOTE — CONSULT NOTE ADULT - PROBLEM SELECTOR RECOMMENDATION 9
Admitted with alcoholic pancreatitis and with hx of ETOH abuse. CIWAs initially 0-1s and increased to 22 today. Pt Currently AOx3 with no complaints, and hyperactive speech on exam.  - Admitted with alcoholic pancreatitis and with hx of ETOH abuse. CIWAs initially 0-1s and increased to 22 today. Pt Currently AOx3 with no complaints, and hyperactive speech on exam. Currently on Librium taper and refusing PRN ativan. Last drink apprx 4 days ago, less concern for w/d hallucinations or seizures assuming he has not had ETOH since admit. Pt is still in window for DTs but currently VS stable and redirectable.  - Cont Librium taper  - f/u Psych recs, d/w psych for capacity to refuse treatment  - cont to monitor CIWAs  - monitor for delirium and DTs(hypertension, tachycardia, sweats)  - cont 1 to 1    Not a MICU candidate at this time. Please call back if symptoms worsen.    Holger Howard MD  Internal Medicine, PGY2  Pager: [phone]  Spectra [phone]

## 2018-08-20 LAB
ALBUMIN SERPL ELPH-MCNC: 3.9 G/DL — SIGNIFICANT CHANGE UP (ref 3.3–5)
ALP SERPL-CCNC: 36 U/L — LOW (ref 40–120)
ALT FLD-CCNC: 32 U/L — SIGNIFICANT CHANGE UP (ref 10–45)
ANION GAP SERPL CALC-SCNC: 12 MMOL/L — SIGNIFICANT CHANGE UP (ref 5–17)
APTT BLD: 27.2 SEC — LOW (ref 27.5–37.4)
AST SERPL-CCNC: 40 U/L — SIGNIFICANT CHANGE UP (ref 10–40)
BASOPHILS # BLD AUTO: 0 K/UL — SIGNIFICANT CHANGE UP (ref 0–0.2)
BASOPHILS NFR BLD AUTO: 0.3 % — SIGNIFICANT CHANGE UP (ref 0–2)
BILIRUB SERPL-MCNC: 1.2 MG/DL — SIGNIFICANT CHANGE UP (ref 0.2–1.2)
BUN SERPL-MCNC: 6 MG/DL — LOW (ref 7–23)
CALCIUM SERPL-MCNC: 8.9 MG/DL — SIGNIFICANT CHANGE UP (ref 8.4–10.5)
CHLORIDE SERPL-SCNC: 105 MMOL/L — SIGNIFICANT CHANGE UP (ref 96–108)
CK SERPL-CCNC: 496 U/L — HIGH (ref 30–200)
CO2 SERPL-SCNC: 21 MMOL/L — LOW (ref 22–31)
CREAT SERPL-MCNC: 0.68 MG/DL — SIGNIFICANT CHANGE UP (ref 0.5–1.3)
EOSINOPHIL # BLD AUTO: 0.2 K/UL — SIGNIFICANT CHANGE UP (ref 0–0.5)
EOSINOPHIL NFR BLD AUTO: 3.2 % — SIGNIFICANT CHANGE UP (ref 0–6)
GAS PNL BLDV: SIGNIFICANT CHANGE UP
GLUCOSE BLDC GLUCOMTR-MCNC: 102 MG/DL — HIGH (ref 70–99)
GLUCOSE BLDC GLUCOMTR-MCNC: 178 MG/DL — HIGH (ref 70–99)
GLUCOSE SERPL-MCNC: 89 MG/DL — SIGNIFICANT CHANGE UP (ref 70–99)
HCT VFR BLD CALC: 44 % — SIGNIFICANT CHANGE UP (ref 39–50)
HGB BLD-MCNC: 15.3 G/DL — SIGNIFICANT CHANGE UP (ref 13–17)
INR BLD: 1.14 RATIO — SIGNIFICANT CHANGE UP (ref 0.88–1.16)
LYMPHOCYTES # BLD AUTO: 0.6 K/UL — LOW (ref 1–3.3)
LYMPHOCYTES # BLD AUTO: 11.7 % — LOW (ref 13–44)
MAGNESIUM SERPL-MCNC: 2 MG/DL — SIGNIFICANT CHANGE UP (ref 1.6–2.6)
MCHC RBC-ENTMCNC: 33.2 PG — SIGNIFICANT CHANGE UP (ref 27–34)
MCHC RBC-ENTMCNC: 34.8 GM/DL — SIGNIFICANT CHANGE UP (ref 32–36)
MCV RBC AUTO: 95.5 FL — SIGNIFICANT CHANGE UP (ref 80–100)
MONOCYTES # BLD AUTO: 0.4 K/UL — SIGNIFICANT CHANGE UP (ref 0–0.9)
MONOCYTES NFR BLD AUTO: 6.8 % — SIGNIFICANT CHANGE UP (ref 2–14)
NEUTROPHILS # BLD AUTO: 4.2 K/UL — SIGNIFICANT CHANGE UP (ref 1.8–7.4)
NEUTROPHILS NFR BLD AUTO: 78.1 % — HIGH (ref 43–77)
PHOSPHATE SERPL-MCNC: 2.4 MG/DL — LOW (ref 2.5–4.5)
PLATELET # BLD AUTO: 74 K/UL — LOW (ref 150–400)
POTASSIUM SERPL-MCNC: 3.9 MMOL/L — SIGNIFICANT CHANGE UP (ref 3.5–5.3)
POTASSIUM SERPL-SCNC: 3.9 MMOL/L — SIGNIFICANT CHANGE UP (ref 3.5–5.3)
PROT SERPL-MCNC: 6.4 G/DL — SIGNIFICANT CHANGE UP (ref 6–8.3)
PROTHROM AB SERPL-ACNC: 12.3 SEC — SIGNIFICANT CHANGE UP (ref 9.8–12.7)
RBC # BLD: 4.61 M/UL — SIGNIFICANT CHANGE UP (ref 4.2–5.8)
RBC # FLD: 12.4 % — SIGNIFICANT CHANGE UP (ref 10.3–14.5)
SODIUM SERPL-SCNC: 138 MMOL/L — SIGNIFICANT CHANGE UP (ref 135–145)
WBC # BLD: 5.4 K/UL — SIGNIFICANT CHANGE UP (ref 3.8–10.5)
WBC # FLD AUTO: 5.4 K/UL — SIGNIFICANT CHANGE UP (ref 3.8–10.5)

## 2018-08-20 PROCEDURE — 99291 CRITICAL CARE FIRST HOUR: CPT

## 2018-08-20 PROCEDURE — 99232 SBSQ HOSP IP/OBS MODERATE 35: CPT

## 2018-08-20 PROCEDURE — 93010 ELECTROCARDIOGRAM REPORT: CPT

## 2018-08-20 RX ORDER — SODIUM CHLORIDE 9 MG/ML
1000 INJECTION, SOLUTION INTRAVENOUS
Qty: 0 | Refills: 0 | Status: DISCONTINUED | OUTPATIENT
Start: 2018-08-20 | End: 2018-08-21

## 2018-08-20 RX ORDER — METOPROLOL TARTRATE 50 MG
5 TABLET ORAL EVERY 6 HOURS
Qty: 0 | Refills: 0 | Status: DISCONTINUED | OUTPATIENT
Start: 2018-08-20 | End: 2018-08-25

## 2018-08-20 RX ORDER — HALOPERIDOL DECANOATE 100 MG/ML
5 INJECTION INTRAMUSCULAR EVERY 4 HOURS
Qty: 0 | Refills: 0 | Status: DISCONTINUED | OUTPATIENT
Start: 2018-08-20 | End: 2018-08-22

## 2018-08-20 RX ORDER — METOPROLOL TARTRATE 50 MG
5 TABLET ORAL ONCE
Qty: 0 | Refills: 0 | Status: COMPLETED | OUTPATIENT
Start: 2018-08-20 | End: 2018-08-20

## 2018-08-20 RX ORDER — SODIUM CHLORIDE 9 MG/ML
1000 INJECTION, SOLUTION INTRAVENOUS
Qty: 0 | Refills: 0 | Status: DISCONTINUED | OUTPATIENT
Start: 2018-08-20 | End: 2018-08-22

## 2018-08-20 RX ORDER — HALOPERIDOL DECANOATE 100 MG/ML
5 INJECTION INTRAMUSCULAR ONCE
Qty: 0 | Refills: 0 | Status: COMPLETED | OUTPATIENT
Start: 2018-08-20 | End: 2018-08-20

## 2018-08-20 RX ORDER — POTASSIUM CHLORIDE 20 MEQ
10 PACKET (EA) ORAL ONCE
Qty: 0 | Refills: 0 | Status: COMPLETED | OUTPATIENT
Start: 2018-08-20 | End: 2018-08-20

## 2018-08-20 RX ORDER — PHENOBARBITAL 60 MG
130 TABLET ORAL EVERY 6 HOURS
Qty: 0 | Refills: 0 | Status: DISCONTINUED | OUTPATIENT
Start: 2018-08-20 | End: 2018-08-20

## 2018-08-20 RX ORDER — OLANZAPINE 15 MG/1
5 TABLET, FILM COATED ORAL ONCE
Qty: 0 | Refills: 0 | Status: COMPLETED | OUTPATIENT
Start: 2018-08-20 | End: 2018-08-20

## 2018-08-20 RX ORDER — POTASSIUM PHOSPHATE, MONOBASIC POTASSIUM PHOSPHATE, DIBASIC 236; 224 MG/ML; MG/ML
15 INJECTION, SOLUTION INTRAVENOUS ONCE
Qty: 0 | Refills: 0 | Status: COMPLETED | OUTPATIENT
Start: 2018-08-20 | End: 2018-08-20

## 2018-08-20 RX ORDER — PHENOBARBITAL 60 MG
130 TABLET ORAL EVERY 4 HOURS
Qty: 0 | Refills: 0 | Status: DISCONTINUED | OUTPATIENT
Start: 2018-08-20 | End: 2018-08-21

## 2018-08-20 RX ADMIN — Medication 130 MILLIGRAM(S): at 00:20

## 2018-08-20 RX ADMIN — HALOPERIDOL DECANOATE 5 MILLIGRAM(S): 100 INJECTION INTRAMUSCULAR at 17:27

## 2018-08-20 RX ADMIN — Medication 5 MILLIGRAM(S): at 17:25

## 2018-08-20 RX ADMIN — HALOPERIDOL DECANOATE 5 MILLIGRAM(S): 100 INJECTION INTRAMUSCULAR at 22:25

## 2018-08-20 RX ADMIN — Medication 100 MILLIEQUIVALENT(S): at 11:43

## 2018-08-20 RX ADMIN — Medication 130 MILLIGRAM(S): at 11:00

## 2018-08-20 RX ADMIN — Medication 130 MILLIGRAM(S): at 17:27

## 2018-08-20 RX ADMIN — PANTOPRAZOLE SODIUM 40 MILLIGRAM(S): 20 TABLET, DELAYED RELEASE ORAL at 17:26

## 2018-08-20 RX ADMIN — HYDROMORPHONE HYDROCHLORIDE 1 MILLIGRAM(S): 2 INJECTION INTRAMUSCULAR; INTRAVENOUS; SUBCUTANEOUS at 10:00

## 2018-08-20 RX ADMIN — Medication 130 MILLIGRAM(S): at 17:00

## 2018-08-20 RX ADMIN — HALOPERIDOL DECANOATE 5 MILLIGRAM(S): 100 INJECTION INTRAMUSCULAR at 11:30

## 2018-08-20 RX ADMIN — Medication 130 MILLIGRAM(S): at 20:00

## 2018-08-20 RX ADMIN — POTASSIUM PHOSPHATE, MONOBASIC POTASSIUM PHOSPHATE, DIBASIC 62.5 MILLIMOLE(S): 236; 224 INJECTION, SOLUTION INTRAVENOUS at 01:27

## 2018-08-20 RX ADMIN — Medication 130 MILLIGRAM(S): at 20:30

## 2018-08-20 RX ADMIN — Medication 130 MILLIGRAM(S): at 20:15

## 2018-08-20 RX ADMIN — Medication 130 MILLIGRAM(S): at 09:30

## 2018-08-20 RX ADMIN — Medication 130 MILLIGRAM(S): at 03:45

## 2018-08-20 RX ADMIN — OLANZAPINE 5 MILLIGRAM(S): 15 TABLET, FILM COATED ORAL at 23:45

## 2018-08-20 RX ADMIN — OLANZAPINE 5 MILLIGRAM(S): 15 TABLET, FILM COATED ORAL at 22:45

## 2018-08-20 RX ADMIN — HALOPERIDOL DECANOATE 5 MILLIGRAM(S): 100 INJECTION INTRAMUSCULAR at 20:52

## 2018-08-20 RX ADMIN — Medication 130 MILLIGRAM(S): at 21:30

## 2018-08-20 RX ADMIN — Medication 130 MILLIGRAM(S): at 15:12

## 2018-08-20 RX ADMIN — PANTOPRAZOLE SODIUM 40 MILLIGRAM(S): 20 TABLET, DELAYED RELEASE ORAL at 05:19

## 2018-08-20 RX ADMIN — Medication 130 MILLIGRAM(S): at 12:44

## 2018-08-20 RX ADMIN — Medication 130 MILLIGRAM(S): at 22:30

## 2018-08-20 RX ADMIN — Medication 130 MILLIGRAM(S): at 14:03

## 2018-08-20 RX ADMIN — Medication 105 MILLIGRAM(S): at 05:19

## 2018-08-20 RX ADMIN — HYDROMORPHONE HYDROCHLORIDE 1 MILLIGRAM(S): 2 INJECTION INTRAMUSCULAR; INTRAVENOUS; SUBCUTANEOUS at 09:30

## 2018-08-20 RX ADMIN — Medication 105 MILLIGRAM(S): at 14:03

## 2018-08-20 RX ADMIN — Medication 130 MILLIGRAM(S): at 06:13

## 2018-08-20 RX ADMIN — Medication 130 MILLIGRAM(S): at 08:15

## 2018-08-20 RX ADMIN — SODIUM CHLORIDE 100 MILLILITER(S): 9 INJECTION, SOLUTION INTRAVENOUS at 09:32

## 2018-08-20 RX ADMIN — Medication 130 MILLIGRAM(S): at 22:15

## 2018-08-20 RX ADMIN — Medication 105 MILLIGRAM(S): at 21:41

## 2018-08-20 RX ADMIN — Medication 5 MILLIGRAM(S): at 06:13

## 2018-08-20 RX ADMIN — Medication 130 MILLIGRAM(S): at 21:15
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
## 2018-08-20 NOTE — PROGRESS NOTE ADULT - PROBLEM SELECTOR PLAN 1
abstain from alcohol  use   trend lipsdr [facility] smylsfr abstain from alcohol  use
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
## 2018-08-20 NOTE — PROGRESS NOTE BEHAVIORAL HEALTH - NSBHCONSULTMEDS_PSY_A_CORE FT
c/w phenobarb per MICU    PRN: Haldol 5mg PO/IM q6h PRN agitation, Benadryl 50mg PO/IM q6h PRN EPS ppx.  Monitor EKG for QTc<500.
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
## 2018-08-20 NOTE — PROGRESS NOTE BEHAVIORAL HEALTH - NSBHCHARTREVIEWLAB_PSY_A_CORE FT
15.3   5.4   )-----------( 74       ( 20 Aug 2018 00:16 )             44.0     08-20    138  |  105  |  6<L>  ----------------------------<  89  3.9   |  21<L>  |  0.68    Ca    8.9      20 Aug 2018 00:16  Phos  2.4     08-20  Mg     2.0     08-20    TPro  6.4  /  Alb  3.9  /  TBili  1.2  /  DBili  x   /  AST  40  /  ALT  32  /  AlkPhos  36<L>  08-20
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
## 2018-08-20 NOTE — PROGRESS NOTE BEHAVIORAL HEALTH - NSBHCHARTREVIEWVS_PSY_A_CORE FT
T(C): 37.2 (08-20-18 @ 11:00), Max: 37.2 (08-19-18 @ 16:00)  HR: 88 (08-20-18 @ 13:00) (74 - 89)  BP: 147/94 (08-20-18 @ 13:00) (115/98 - 191/111)  RR: 22 (08-20-18 @ 13:00) (18 - 36)  SpO2: 93% (08-20-18 @ 13:00) (92% - 98%)  Wt(kg): --
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
## 2018-08-21 LAB
ALBUMIN SERPL ELPH-MCNC: 3.7 G/DL — SIGNIFICANT CHANGE UP (ref 3.3–5)
ALP SERPL-CCNC: 37 U/L — LOW (ref 40–120)
ALT FLD-CCNC: 26 U/L — SIGNIFICANT CHANGE UP (ref 10–45)
AMYLASE P1 CFR SERPL: 33 U/L — SIGNIFICANT CHANGE UP (ref 25–125)
ANION GAP SERPL CALC-SCNC: 16 MMOL/L — SIGNIFICANT CHANGE UP (ref 5–17)
APPEARANCE UR: ABNORMAL
APTT BLD: 23.7 SEC — LOW (ref 27.5–37.4)
AST SERPL-CCNC: 30 U/L — SIGNIFICANT CHANGE UP (ref 10–40)
BILIRUB SERPL-MCNC: 1.8 MG/DL — HIGH (ref 0.2–1.2)
BILIRUB UR-MCNC: NEGATIVE — SIGNIFICANT CHANGE UP
BUN SERPL-MCNC: 5 MG/DL — LOW (ref 7–23)
CALCIUM SERPL-MCNC: 8.8 MG/DL — SIGNIFICANT CHANGE UP (ref 8.4–10.5)
CHLORIDE SERPL-SCNC: 103 MMOL/L — SIGNIFICANT CHANGE UP (ref 96–108)
CK SERPL-CCNC: 568 U/L — HIGH (ref 30–200)
CO2 SERPL-SCNC: 21 MMOL/L — LOW (ref 22–31)
COLOR SPEC: YELLOW — SIGNIFICANT CHANGE UP
CREAT SERPL-MCNC: 0.82 MG/DL — SIGNIFICANT CHANGE UP (ref 0.5–1.3)
DIFF PNL FLD: ABNORMAL
GAS PNL BLDV: SIGNIFICANT CHANGE UP
GLUCOSE BLDC GLUCOMTR-MCNC: 119 MG/DL — HIGH (ref 70–99)
GLUCOSE BLDC GLUCOMTR-MCNC: 151 MG/DL — HIGH (ref 70–99)
GLUCOSE SERPL-MCNC: 127 MG/DL — HIGH (ref 70–99)
GLUCOSE UR QL: NEGATIVE — SIGNIFICANT CHANGE UP
HAV IGM SER-ACNC: SIGNIFICANT CHANGE UP
HBV CORE IGM SER-ACNC: SIGNIFICANT CHANGE UP
HBV SURFACE AG SER-ACNC: SIGNIFICANT CHANGE UP
HCT VFR BLD CALC: 40.4 % — SIGNIFICANT CHANGE UP (ref 39–50)
HCV AB S/CO SERPL IA: 0.11 S/CO — SIGNIFICANT CHANGE UP
HCV AB SERPL-IMP: SIGNIFICANT CHANGE UP
HGB BLD-MCNC: 14.3 G/DL — SIGNIFICANT CHANGE UP (ref 13–17)
INR BLD: 1.43 RATIO — HIGH (ref 0.88–1.16)
KETONES UR-MCNC: ABNORMAL
LEUKOCYTE ESTERASE UR-ACNC: ABNORMAL
LIDOCAIN IGE QN: 16 U/L — SIGNIFICANT CHANGE UP (ref 7–60)
MAGNESIUM SERPL-MCNC: 1.5 MG/DL — LOW (ref 1.6–2.6)
MCHC RBC-ENTMCNC: 32.6 PG — SIGNIFICANT CHANGE UP (ref 27–34)
MCHC RBC-ENTMCNC: 35.3 GM/DL — SIGNIFICANT CHANGE UP (ref 32–36)
MCV RBC AUTO: 92.2 FL — SIGNIFICANT CHANGE UP (ref 80–100)
NITRITE UR-MCNC: NEGATIVE — SIGNIFICANT CHANGE UP
PH UR: 6 — SIGNIFICANT CHANGE UP (ref 5–8)
PHENOBARB SERPL-MCNC: >55 UG/ML — CRITICAL HIGH (ref 15–40)
PHOSPHATE SERPL-MCNC: 2.1 MG/DL — LOW (ref 2.5–4.5)
PLATELET # BLD AUTO: 96 K/UL — LOW (ref 150–400)
POTASSIUM SERPL-MCNC: 3.6 MMOL/L — SIGNIFICANT CHANGE UP (ref 3.5–5.3)
POTASSIUM SERPL-SCNC: 3.6 MMOL/L — SIGNIFICANT CHANGE UP (ref 3.5–5.3)
PROT SERPL-MCNC: 6.3 G/DL — SIGNIFICANT CHANGE UP (ref 6–8.3)
PROT UR-MCNC: SIGNIFICANT CHANGE UP
PROTHROM AB SERPL-ACNC: 15.7 SEC — HIGH (ref 9.8–12.7)
RBC # BLD: 4.38 M/UL — SIGNIFICANT CHANGE UP (ref 4.2–5.8)
RBC # FLD: 11.5 % — SIGNIFICANT CHANGE UP (ref 10.3–14.5)
SODIUM SERPL-SCNC: 140 MMOL/L — SIGNIFICANT CHANGE UP (ref 135–145)
SP GR SPEC: 1.01 — SIGNIFICANT CHANGE UP (ref 1.01–1.02)
UROBILINOGEN FLD QL: NEGATIVE — SIGNIFICANT CHANGE UP
WBC # BLD: 6.3 K/UL — SIGNIFICANT CHANGE UP (ref 3.8–10.5)
WBC # FLD AUTO: 6.3 K/UL — SIGNIFICANT CHANGE UP (ref 3.8–10.5)

## 2018-08-21 PROCEDURE — 99291 CRITICAL CARE FIRST HOUR: CPT

## 2018-08-21 PROCEDURE — 93010 ELECTROCARDIOGRAM REPORT: CPT

## 2018-08-21 PROCEDURE — 93010 ELECTROCARDIOGRAM REPORT: CPT | Mod: 77

## 2018-08-21 PROCEDURE — 71045 X-RAY EXAM CHEST 1 VIEW: CPT | Mod: 26

## 2018-08-21 RX ORDER — CEFTRIAXONE 500 MG/1
INJECTION, POWDER, FOR SOLUTION INTRAMUSCULAR; INTRAVENOUS
Qty: 0 | Refills: 0 | Status: DISCONTINUED | OUTPATIENT
Start: 2018-08-21 | End: 2018-08-23

## 2018-08-21 RX ORDER — THIAMINE MONONITRATE (VIT B1) 100 MG
100 TABLET ORAL DAILY
Qty: 0 | Refills: 0 | Status: DISCONTINUED | OUTPATIENT
Start: 2018-08-22 | End: 2018-08-26

## 2018-08-21 RX ORDER — POTASSIUM PHOSPHATE, MONOBASIC POTASSIUM PHOSPHATE, DIBASIC 236; 224 MG/ML; MG/ML
15 INJECTION, SOLUTION INTRAVENOUS ONCE
Qty: 0 | Refills: 0 | Status: COMPLETED | OUTPATIENT
Start: 2018-08-21 | End: 2018-08-21

## 2018-08-21 RX ORDER — HALOPERIDOL DECANOATE 100 MG/ML
5 INJECTION INTRAMUSCULAR ONCE
Qty: 0 | Refills: 0 | Status: COMPLETED | OUTPATIENT
Start: 2018-08-21 | End: 2018-08-21

## 2018-08-21 RX ORDER — MAGNESIUM SULFATE 500 MG/ML
2 VIAL (ML) INJECTION ONCE
Qty: 0 | Refills: 0 | Status: COMPLETED | OUTPATIENT
Start: 2018-08-21 | End: 2018-08-21

## 2018-08-21 RX ORDER — CEFTRIAXONE 500 MG/1
1 INJECTION, POWDER, FOR SOLUTION INTRAMUSCULAR; INTRAVENOUS EVERY 24 HOURS
Qty: 0 | Refills: 0 | Status: DISCONTINUED | OUTPATIENT
Start: 2018-08-22 | End: 2018-08-23

## 2018-08-21 RX ORDER — OLANZAPINE 15 MG/1
5 TABLET, FILM COATED ORAL ONCE
Qty: 0 | Refills: 0 | Status: COMPLETED | OUTPATIENT
Start: 2018-08-21 | End: 2018-08-20

## 2018-08-21 RX ORDER — OLANZAPINE 15 MG/1
7.5 TABLET, FILM COATED ORAL EVERY 8 HOURS
Qty: 0 | Refills: 0 | Status: DISCONTINUED | OUTPATIENT
Start: 2018-08-21 | End: 2018-08-22

## 2018-08-21 RX ORDER — CEFTRIAXONE 500 MG/1
1 INJECTION, POWDER, FOR SOLUTION INTRAMUSCULAR; INTRAVENOUS ONCE
Qty: 0 | Refills: 0 | Status: COMPLETED | OUTPATIENT
Start: 2018-08-21 | End: 2018-08-21

## 2018-08-21 RX ORDER — POLYETHYLENE GLYCOL 3350 17 G/17G
17 POWDER, FOR SOLUTION ORAL DAILY
Qty: 0 | Refills: 0 | Status: DISCONTINUED | OUTPATIENT
Start: 2018-08-21 | End: 2018-08-26

## 2018-08-21 RX ORDER — AZITHROMYCIN 500 MG/1
TABLET, FILM COATED ORAL
Qty: 0 | Refills: 0 | Status: DISCONTINUED | OUTPATIENT
Start: 2018-08-21 | End: 2018-08-21

## 2018-08-21 RX ORDER — SENNA PLUS 8.6 MG/1
10 TABLET ORAL AT BEDTIME
Qty: 0 | Refills: 0 | Status: DISCONTINUED | OUTPATIENT
Start: 2018-08-21 | End: 2018-08-26

## 2018-08-21 RX ORDER — FOLIC ACID 0.8 MG
1 TABLET ORAL DAILY
Qty: 0 | Refills: 0 | Status: DISCONTINUED | OUTPATIENT
Start: 2018-08-21 | End: 2018-08-26

## 2018-08-21 RX ORDER — AZITHROMYCIN 500 MG/1
500 TABLET, FILM COATED ORAL ONCE
Qty: 0 | Refills: 0 | Status: COMPLETED | OUTPATIENT
Start: 2018-08-21 | End: 2018-08-21

## 2018-08-21 RX ORDER — PHENOBARBITAL 60 MG
130 TABLET ORAL EVERY 6 HOURS
Qty: 0 | Refills: 0 | Status: DISCONTINUED | OUTPATIENT
Start: 2018-08-21 | End: 2018-08-21

## 2018-08-21 RX ADMIN — Medication 1 MILLIGRAM(S): at 14:44

## 2018-08-21 RX ADMIN — OLANZAPINE 7.5 MILLIGRAM(S): 15 TABLET, FILM COATED ORAL at 13:44

## 2018-08-21 RX ADMIN — Medication 130 MILLIGRAM(S): at 03:40

## 2018-08-21 RX ADMIN — Medication 50 GRAM(S): at 06:03

## 2018-08-21 RX ADMIN — AZITHROMYCIN 250 MILLIGRAM(S): 500 TABLET, FILM COATED ORAL at 02:19

## 2018-08-21 RX ADMIN — Medication 130 MILLIGRAM(S): at 02:24

## 2018-08-21 RX ADMIN — PANTOPRAZOLE SODIUM 40 MILLIGRAM(S): 20 TABLET, DELAYED RELEASE ORAL at 05:03

## 2018-08-21 RX ADMIN — Medication 1 TABLET(S): at 13:51

## 2018-08-21 RX ADMIN — MAGNESIUM OXIDE 400 MG ORAL TABLET 200 MILLIGRAM(S): 241.3 TABLET ORAL at 13:51

## 2018-08-21 RX ADMIN — Medication 5 MILLIGRAM(S): at 17:54

## 2018-08-21 RX ADMIN — Medication 130 MILLIGRAM(S): at 02:10

## 2018-08-21 RX ADMIN — Medication 130 MILLIGRAM(S): at 01:10

## 2018-08-21 RX ADMIN — CEFTRIAXONE 100 GRAM(S): 500 INJECTION, POWDER, FOR SOLUTION INTRAMUSCULAR; INTRAVENOUS at 01:45

## 2018-08-21 RX ADMIN — DEXMEDETOMIDINE HYDROCHLORIDE IN 0.9% SODIUM CHLORIDE 1.81 MICROGRAM(S)/KG/HR: 4 INJECTION INTRAVENOUS at 17:55

## 2018-08-21 RX ADMIN — Medication 105 MILLIGRAM(S): at 13:43

## 2018-08-21 RX ADMIN — POTASSIUM PHOSPHATE, MONOBASIC POTASSIUM PHOSPHATE, DIBASIC 62.5 MILLIMOLE(S): 236; 224 INJECTION, SOLUTION INTRAVENOUS at 06:21

## 2018-08-21 RX ADMIN — Medication 130 MILLIGRAM(S): at 12:00

## 2018-08-21 RX ADMIN — Medication 5 MILLIGRAM(S): at 12:14

## 2018-08-21 RX ADMIN — Medication 105 MILLIGRAM(S): at 05:03

## 2018-08-21 RX ADMIN — SENNA PLUS 10 MILLILITER(S): 8.6 TABLET ORAL at 21:02

## 2018-08-21 RX ADMIN — Medication 130 MILLIGRAM(S): at 22:07

## 2018-08-21 RX ADMIN — Medication 130 MILLIGRAM(S): at 22:27

## 2018-08-21 RX ADMIN — SODIUM CHLORIDE 100 MILLILITER(S): 9 INJECTION, SOLUTION INTRAVENOUS at 09:05

## 2018-08-21 RX ADMIN — Medication 130 MILLIGRAM(S): at 00:30

## 2018-08-21 RX ADMIN — Medication 130 MILLIGRAM(S): at 08:20

## 2018-08-21 RX ADMIN — OLANZAPINE 7.5 MILLIGRAM(S): 15 TABLET, FILM COATED ORAL at 21:02

## 2018-08-21 RX ADMIN — Medication 130 MILLIGRAM(S): at 03:19

## 2018-08-21 RX ADMIN — Medication 130 MILLIGRAM(S): at 05:03

## 2018-08-21 RX ADMIN — POLYETHYLENE GLYCOL 3350 17 GRAM(S): 17 POWDER, FOR SOLUTION ORAL at 21:02

## 2018-08-21 RX ADMIN — HALOPERIDOL DECANOATE 5 MILLIGRAM(S): 100 INJECTION INTRAMUSCULAR at 12:20

## 2018-08-21 RX ADMIN — PANTOPRAZOLE SODIUM 40 MILLIGRAM(S): 20 TABLET, DELAYED RELEASE ORAL at 17:55
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
## 2018-08-21 NOTE — PROGRESS NOTE ADULT - ASSESSMENT
resolving pancreatitis, fever can be due to pancreatitis, LFTs also improving  supportive care, follow labs, treating for EtOH withdrawal.   can start clears once MS allows for PO intake
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
## 2018-08-21 NOTE — PROGRESS NOTE ADULT - ATTENDING COMMENTS
38 M with hx alcohol abuse initially admitted with alcoholic pancreatitis and then developed DTs requiring high dose phenobarb and multiple restraints for control. He developed fever overnight and was started on empiric abx for suspected infection (pna vs UTI), fever could also be due to Precedex. He was sedated during morning rounds and phenobarb level >55, will d/c phenobarb. Continue Zyprexa and wean Precedex as tolerated. Psych f/u. Continue 1:1 observation. Insert NGT and start TF as he has been NPO for 4-5 days. Trend LFTs, may need RUQ US. He remain in critical condition, 35mins spent.
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
## 2018-08-22 LAB
ALBUMIN SERPL ELPH-MCNC: 3 G/DL — LOW (ref 3.3–5)
ALP SERPL-CCNC: 38 U/L — LOW (ref 40–120)
ALT FLD-CCNC: 21 U/L — SIGNIFICANT CHANGE UP (ref 10–45)
AMYLASE P1 CFR SERPL: 34 U/L — SIGNIFICANT CHANGE UP (ref 25–125)
ANION GAP SERPL CALC-SCNC: 12 MMOL/L — SIGNIFICANT CHANGE UP (ref 5–17)
APTT BLD: 29.7 SEC — SIGNIFICANT CHANGE UP (ref 27.5–37.4)
AST SERPL-CCNC: 20 U/L — SIGNIFICANT CHANGE UP (ref 10–40)
BASOPHILS # BLD AUTO: 0 K/UL — SIGNIFICANT CHANGE UP (ref 0–0.2)
BASOPHILS NFR BLD AUTO: 0.8 % — SIGNIFICANT CHANGE UP (ref 0–2)
BILIRUB SERPL-MCNC: 0.7 MG/DL — SIGNIFICANT CHANGE UP (ref 0.2–1.2)
BUN SERPL-MCNC: 4 MG/DL — LOW (ref 7–23)
CALCIUM SERPL-MCNC: 8.7 MG/DL — SIGNIFICANT CHANGE UP (ref 8.4–10.5)
CHLORIDE SERPL-SCNC: 110 MMOL/L — HIGH (ref 96–108)
CK SERPL-CCNC: 233 U/L — HIGH (ref 30–200)
CO2 SERPL-SCNC: 24 MMOL/L — SIGNIFICANT CHANGE UP (ref 22–31)
CREAT SERPL-MCNC: 0.69 MG/DL — SIGNIFICANT CHANGE UP (ref 0.5–1.3)
CULTURE RESULTS: NO GROWTH — SIGNIFICANT CHANGE UP
EOSINOPHIL # BLD AUTO: 0.1 K/UL — SIGNIFICANT CHANGE UP (ref 0–0.5)
EOSINOPHIL NFR BLD AUTO: 3 % — SIGNIFICANT CHANGE UP (ref 0–6)
GLUCOSE BLDC GLUCOMTR-MCNC: 132 MG/DL — HIGH (ref 70–99)
GLUCOSE BLDC GLUCOMTR-MCNC: 138 MG/DL — HIGH (ref 70–99)
GLUCOSE SERPL-MCNC: 124 MG/DL — HIGH (ref 70–99)
HCT VFR BLD CALC: 40 % — SIGNIFICANT CHANGE UP (ref 39–50)
HGB BLD-MCNC: 12.9 G/DL — LOW (ref 13–17)
INR BLD: 1.32 RATIO — HIGH (ref 0.88–1.16)
LIDOCAIN IGE QN: 17 U/L — SIGNIFICANT CHANGE UP (ref 7–60)
LYMPHOCYTES # BLD AUTO: 0.6 K/UL — LOW (ref 1–3.3)
LYMPHOCYTES # BLD AUTO: 13.8 % — SIGNIFICANT CHANGE UP (ref 13–44)
MAGNESIUM SERPL-MCNC: 2 MG/DL — SIGNIFICANT CHANGE UP (ref 1.6–2.6)
MCHC RBC-ENTMCNC: 30.7 PG — SIGNIFICANT CHANGE UP (ref 27–34)
MCHC RBC-ENTMCNC: 32.3 GM/DL — SIGNIFICANT CHANGE UP (ref 32–36)
MCV RBC AUTO: 95 FL — SIGNIFICANT CHANGE UP (ref 80–100)
MONOCYTES # BLD AUTO: 0.8 K/UL — SIGNIFICANT CHANGE UP (ref 0–0.9)
MONOCYTES NFR BLD AUTO: 17.6 % — HIGH (ref 2–14)
NEUTROPHILS # BLD AUTO: 2.9 K/UL — SIGNIFICANT CHANGE UP (ref 1.8–7.4)
NEUTROPHILS NFR BLD AUTO: 64.8 % — SIGNIFICANT CHANGE UP (ref 43–77)
PHENOBARB SERPL-MCNC: >55 UG/ML — CRITICAL HIGH (ref 15–40)
PHOSPHATE SERPL-MCNC: 1.9 MG/DL — LOW (ref 2.5–4.5)
PLATELET # BLD AUTO: 110 K/UL — LOW (ref 150–400)
POTASSIUM SERPL-MCNC: 3.5 MMOL/L — SIGNIFICANT CHANGE UP (ref 3.5–5.3)
POTASSIUM SERPL-SCNC: 3.5 MMOL/L — SIGNIFICANT CHANGE UP (ref 3.5–5.3)
PROT SERPL-MCNC: 5.8 G/DL — LOW (ref 6–8.3)
PROTHROM AB SERPL-ACNC: 14.3 SEC — HIGH (ref 9.8–12.7)
RBC # BLD: 4.21 M/UL — SIGNIFICANT CHANGE UP (ref 4.2–5.8)
RBC # FLD: 11.9 % — SIGNIFICANT CHANGE UP (ref 10.3–14.5)
SODIUM SERPL-SCNC: 146 MMOL/L — HIGH (ref 135–145)
SPECIMEN SOURCE: SIGNIFICANT CHANGE UP
WBC # BLD: 4.5 K/UL — SIGNIFICANT CHANGE UP (ref 3.8–10.5)
WBC # FLD AUTO: 4.5 K/UL — SIGNIFICANT CHANGE UP (ref 3.8–10.5)

## 2018-08-22 PROCEDURE — 99291 CRITICAL CARE FIRST HOUR: CPT

## 2018-08-22 PROCEDURE — 99232 SBSQ HOSP IP/OBS MODERATE 35: CPT

## 2018-08-22 PROCEDURE — 93010 ELECTROCARDIOGRAM REPORT: CPT

## 2018-08-22 RX ORDER — ENOXAPARIN SODIUM 100 MG/ML
40 INJECTION SUBCUTANEOUS DAILY
Qty: 0 | Refills: 0 | Status: DISCONTINUED | OUTPATIENT
Start: 2018-08-22 | End: 2018-08-28

## 2018-08-22 RX ORDER — HALOPERIDOL DECANOATE 100 MG/ML
5 INJECTION INTRAMUSCULAR EVERY 6 HOURS
Qty: 0 | Refills: 0 | Status: DISCONTINUED | OUTPATIENT
Start: 2018-08-22 | End: 2018-08-24

## 2018-08-22 RX ORDER — POTASSIUM PHOSPHATE, MONOBASIC POTASSIUM PHOSPHATE, DIBASIC 236; 224 MG/ML; MG/ML
15 INJECTION, SOLUTION INTRAVENOUS ONCE
Qty: 0 | Refills: 0 | Status: COMPLETED | OUTPATIENT
Start: 2018-08-22 | End: 2018-08-22

## 2018-08-22 RX ORDER — POTASSIUM CHLORIDE 20 MEQ
40 PACKET (EA) ORAL ONCE
Qty: 0 | Refills: 0 | Status: COMPLETED | OUTPATIENT
Start: 2018-08-22 | End: 2018-08-22

## 2018-08-22 RX ORDER — OLANZAPINE 15 MG/1
5 TABLET, FILM COATED ORAL EVERY 6 HOURS
Qty: 0 | Refills: 0 | Status: DISCONTINUED | OUTPATIENT
Start: 2018-08-22 | End: 2018-08-27

## 2018-08-22 RX ORDER — OLANZAPINE 15 MG/1
10 TABLET, FILM COATED ORAL EVERY 8 HOURS
Qty: 0 | Refills: 0 | Status: DISCONTINUED | OUTPATIENT
Start: 2018-08-22 | End: 2018-08-22

## 2018-08-22 RX ADMIN — ENOXAPARIN SODIUM 40 MILLIGRAM(S): 100 INJECTION SUBCUTANEOUS at 13:43

## 2018-08-22 RX ADMIN — Medication 5 MILLIGRAM(S): at 05:04

## 2018-08-22 RX ADMIN — HALOPERIDOL DECANOATE 5 MILLIGRAM(S): 100 INJECTION INTRAMUSCULAR at 13:40

## 2018-08-22 RX ADMIN — HALOPERIDOL DECANOATE 5 MILLIGRAM(S): 100 INJECTION INTRAMUSCULAR at 18:10

## 2018-08-22 RX ADMIN — DEXMEDETOMIDINE HYDROCHLORIDE IN 0.9% SODIUM CHLORIDE 1.81 MICROGRAM(S)/KG/HR: 4 INJECTION INTRAVENOUS at 18:10

## 2018-08-22 RX ADMIN — SENNA PLUS 10 MILLILITER(S): 8.6 TABLET ORAL at 21:09

## 2018-08-22 RX ADMIN — Medication 1 MILLIGRAM(S): at 13:42

## 2018-08-22 RX ADMIN — MAGNESIUM OXIDE 400 MG ORAL TABLET 200 MILLIGRAM(S): 241.3 TABLET ORAL at 13:45

## 2018-08-22 RX ADMIN — PANTOPRAZOLE SODIUM 40 MILLIGRAM(S): 20 TABLET, DELAYED RELEASE ORAL at 18:10

## 2018-08-22 RX ADMIN — CEFTRIAXONE 100 GRAM(S): 500 INJECTION, POWDER, FOR SOLUTION INTRAMUSCULAR; INTRAVENOUS at 01:25

## 2018-08-22 RX ADMIN — Medication 130 MILLIGRAM(S): at 01:40

## 2018-08-22 RX ADMIN — Medication 100 MILLIGRAM(S): at 13:42

## 2018-08-22 RX ADMIN — Medication 5 MILLIGRAM(S): at 13:39

## 2018-08-22 RX ADMIN — Medication 1 TABLET(S): at 13:42

## 2018-08-22 RX ADMIN — Medication 5 MILLIGRAM(S): at 00:24

## 2018-08-22 RX ADMIN — DEXMEDETOMIDINE HYDROCHLORIDE IN 0.9% SODIUM CHLORIDE 1.81 MICROGRAM(S)/KG/HR: 4 INJECTION INTRAVENOUS at 21:10

## 2018-08-22 RX ADMIN — Medication 40 MILLIEQUIVALENT(S): at 01:42

## 2018-08-22 RX ADMIN — SODIUM CHLORIDE 100 MILLILITER(S): 9 INJECTION, SOLUTION INTRAVENOUS at 05:04

## 2018-08-22 RX ADMIN — Medication 5 MILLIGRAM(S): at 18:10

## 2018-08-22 RX ADMIN — MAGNESIUM OXIDE 400 MG ORAL TABLET 200 MILLIGRAM(S): 241.3 TABLET ORAL at 21:09

## 2018-08-22 RX ADMIN — POTASSIUM PHOSPHATE, MONOBASIC POTASSIUM PHOSPHATE, DIBASIC 62.5 MILLIMOLE(S): 236; 224 INJECTION, SOLUTION INTRAVENOUS at 01:52

## 2018-08-22 RX ADMIN — HALOPERIDOL DECANOATE 5 MILLIGRAM(S): 100 INJECTION INTRAMUSCULAR at 01:28

## 2018-08-22 RX ADMIN — PANTOPRAZOLE SODIUM 40 MILLIGRAM(S): 20 TABLET, DELAYED RELEASE ORAL at 05:04

## 2018-08-22 RX ADMIN — POLYETHYLENE GLYCOL 3350 17 GRAM(S): 17 POWDER, FOR SOLUTION ORAL at 13:41

## 2018-08-22 RX ADMIN — DEXMEDETOMIDINE HYDROCHLORIDE IN 0.9% SODIUM CHLORIDE 1.81 MICROGRAM(S)/KG/HR: 4 INJECTION INTRAVENOUS at 00:24

## 2018-08-22 RX ADMIN — OLANZAPINE 10 MILLIGRAM(S): 15 TABLET, FILM COATED ORAL at 05:04

## 2018-08-22 RX ADMIN — Medication 130 MILLIGRAM(S): at 04:45

## 2018-08-22 NOTE — DIETITIAN INITIAL EVALUATION ADULT. - NS AS NUTRI INTERV ENTERAL NUTRITION
Recommend Jevity 1.2 at 85cc/hr x 18 hrs provides 1836 kcals, 85 gm protein, 1235 cc free water  meets 26 Kcal/Kg, 1.2Gm/kg dosing wt 70kg
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
## 2018-08-22 NOTE — PROGRESS NOTE BEHAVIORAL HEALTH - NSBHFUPINTERVALHXFT_PSY_A_CORE
Pt remains sedated despite attempt to wean Precedex.  Given Zyprexa 10mg IM this morning by primary team, also additional doses of phenobarbital and sedation with Precedex.  Per team was very agitated overnight.  Mother states pt was able to squeeze her hand and follow simple commands earlier today.
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
## 2018-08-22 NOTE — PROGRESS NOTE BEHAVIORAL HEALTH - NSBHCONSULTMEDS_PSY_A_CORE FT
1. c/w phenobarb per MICU    2. Start Haldol 5mg PO/IV q6h standing.  F/u daily EKG for QTc<500    3. PRN: Zyprexa 5mg PO/IM q6h PRN agitation.    4. CL psych will f/u.

## 2018-08-22 NOTE — DIETITIAN INITIAL EVALUATION ADULT. - OTHER INFO
Pt seen for: MICU Length Of Stay   Adm dx:  ETOH withdrawal, pancreatitis (resolving)   GI issues: no N/V/D  Last BM: none since adm     Food allergies: NKFA     Vit/supplement PTA:  folic acid, mvi, cranberry, protein powder

## 2018-08-22 NOTE — PROGRESS NOTE BEHAVIORAL HEALTH - NSBHCHARTREVIEWVS_PSY_A_CORE FT
T(C): 36.8 (08-22-18 @ 12:00), Max: 36.9 (08-21-18 @ 20:00)  HR: 81 (08-22-18 @ 16:00) (69 - 104)  BP: 111/67 (08-22-18 @ 16:00) (102/60 - 156/103)  RR: 28 (08-22-18 @ 16:00) (17 - 28)  SpO2: 100% (08-22-18 @ 16:00) (96% - 100%)  Wt(kg): --
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
## 2018-08-22 NOTE — DIETITIAN INITIAL EVALUATION ADULT. - ENERGY NEEDS
Ht: 67"  Wt: 154  BMI: 24.2  kg/m2   IBW: 148 (+/-10%)     within IBW  Edema: 1+ B/L hand   Skin: no pressure injuries

## 2018-08-22 NOTE — PROGRESS NOTE ADULT - ASSESSMENT
unable to assess abdomin secondary to sedation. on feeds.  check axr. willl follow with you.  r/o ileus, follow residuals closely.

## 2018-08-22 NOTE — PROGRESS NOTE ADULT - ASSESSMENT
38 M with hx alcohol abuse initially admitted with alcoholic pancreatitis and then developed DTs requiring high dose phenobarb, precedex gtt, zyprexa OTC and restraints for control. He was afebrile overnight and is being treated for suspected infection (pna vs UTI).   Continue Zyprexa and wean Precedex as tolerated. Psych f/u. Continue 1:1 observation. Insert NGT and start TF as he has been NPO for 4-5 days. Trend LFTs, may need RUQ US. He remain in critical condition, 35mins spent.  C          #Neuro  - On Precedex gtt added standing meds to try and titrate off gtt  - Phenobarb PRN  - Haldol PRN  - Increased  Zyprexa 10mg q 8hrs    #Psy  - Psych f/u.   - Continue 1:1  - restraints     #CV  - Stable, continue to monitor   - Hypertension continue Lopressor IV    #Pulm:  No acute distress  On RA, keep sats greater the 92%    #GI  - Acute Pancreatitis  - Trend Lipase trending down,  - Dilaudid PRN Pain  - Started tube feeds Jevity     #Renal  - Rhabdomyolysis, resolving,  -  follow cpk. - monitor UO  - BMP daily    #  -Taylor cath DCed yesterday  -Voiding freely in condom cath  -Bladder scan q 6 PRN    #Hem  - Platelet count decreased by less then 30% after starting LOvenox  -  4Ts score 2< 5% change  - Hold Lovenox for today  - PAS     #ID  Febrile overnight  FU Cultures  UA positive FU C&S  Started antibiotic last night Ceftriaxone        # DVT ppx:  - PAS  - restarted Lovenox

## 2018-08-22 NOTE — DIETITIAN INITIAL EVALUATION ADULT. - PROBLEM SELECTOR PLAN 1
IV hydration -D5 ns at 125 c /hr then down to 100cc/hr  Pain control  Dilaudid 2 mg ivss q 4 hours  GI consult Dr Yu
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
## 2018-08-22 NOTE — DIETITIAN INITIAL EVALUATION ADULT. - NUTRITION INTERVENTION
Enteral Nutrition Enteral Nutrition/Vitamin Collaboration and Referral of Nutrition Care/Enteral Nutrition/Vitamin

## 2018-08-22 NOTE — PROGRESS NOTE BEHAVIORAL HEALTH - NSBHCHARTREVIEWLAB_PSY_A_CORE FT
12.9   4.5   )-----------( 110      ( 22 Aug 2018 00:35 )             40.0     -    146<H>  |  110<H>  |  4<L>  ----------------------------<  124<H>  3.5   |  24  |  0.69    Ca    8.7      22 Aug 2018 00:37  Phos  1.9       Mg     2.0         TPro  5.8<L>  /  Alb  3.0<L>  /  TBili  0.7  /  DBili  x   /  AST  20  /  ALT  21  /  AlkPhos  38<L>      Urinalysis Basic - ( 21 Aug 2018 02:17 )    Color: Yellow / Appearance: SL Turbid / S.010 / pH: x  Gluc: x / Ketone: Moderate  / Bili: Negative / Urobili: Negative   Blood: x / Protein: Trace / Nitrite: Negative   Leuk Esterase: Large / RBC: 3-5 /HPF / WBC 26-50 /HPF   Sq Epi: x / Non Sq Epi: OCC /HPF / Bacteria: Few /HPF
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
## 2018-08-23 LAB
ALBUMIN SERPL ELPH-MCNC: 3.4 G/DL — SIGNIFICANT CHANGE UP (ref 3.3–5)
ALP SERPL-CCNC: 39 U/L — LOW (ref 40–120)
ALT FLD-CCNC: 21 U/L — SIGNIFICANT CHANGE UP (ref 10–45)
ANION GAP SERPL CALC-SCNC: 11 MMOL/L — SIGNIFICANT CHANGE UP (ref 5–17)
APTT BLD: 30.8 SEC — SIGNIFICANT CHANGE UP (ref 27.5–37.4)
AST SERPL-CCNC: 22 U/L — SIGNIFICANT CHANGE UP (ref 10–40)
BASOPHILS # BLD AUTO: 0 K/UL — SIGNIFICANT CHANGE UP (ref 0–0.2)
BASOPHILS NFR BLD AUTO: 0.9 % — SIGNIFICANT CHANGE UP (ref 0–2)
BILIRUB SERPL-MCNC: 0.4 MG/DL — SIGNIFICANT CHANGE UP (ref 0.2–1.2)
BUN SERPL-MCNC: 4 MG/DL — LOW (ref 7–23)
CALCIUM SERPL-MCNC: 9.2 MG/DL — SIGNIFICANT CHANGE UP (ref 8.4–10.5)
CHLORIDE SERPL-SCNC: 104 MMOL/L — SIGNIFICANT CHANGE UP (ref 96–108)
CK SERPL-CCNC: 153 U/L — SIGNIFICANT CHANGE UP (ref 30–200)
CO2 SERPL-SCNC: 25 MMOL/L — SIGNIFICANT CHANGE UP (ref 22–31)
CREAT SERPL-MCNC: 0.61 MG/DL — SIGNIFICANT CHANGE UP (ref 0.5–1.3)
EOSINOPHIL # BLD AUTO: 0.2 K/UL — SIGNIFICANT CHANGE UP (ref 0–0.5)
EOSINOPHIL NFR BLD AUTO: 5 % — SIGNIFICANT CHANGE UP (ref 0–6)
GLUCOSE BLDC GLUCOMTR-MCNC: 105 MG/DL — HIGH (ref 70–99)
GLUCOSE BLDC GLUCOMTR-MCNC: 118 MG/DL — HIGH (ref 70–99)
GLUCOSE BLDC GLUCOMTR-MCNC: 120 MG/DL — HIGH (ref 70–99)
GLUCOSE SERPL-MCNC: 147 MG/DL — HIGH (ref 70–99)
HCT VFR BLD CALC: 38.8 % — LOW (ref 39–50)
HGB BLD-MCNC: 13.3 G/DL — SIGNIFICANT CHANGE UP (ref 13–17)
INR BLD: 1.24 RATIO — HIGH (ref 0.88–1.16)
LYMPHOCYTES # BLD AUTO: 0.6 K/UL — LOW (ref 1–3.3)
LYMPHOCYTES # BLD AUTO: 17.1 % — SIGNIFICANT CHANGE UP (ref 13–44)
MAGNESIUM SERPL-MCNC: 1.9 MG/DL — SIGNIFICANT CHANGE UP (ref 1.6–2.6)
MCHC RBC-ENTMCNC: 32.4 PG — SIGNIFICANT CHANGE UP (ref 27–34)
MCHC RBC-ENTMCNC: 34.3 GM/DL — SIGNIFICANT CHANGE UP (ref 32–36)
MCV RBC AUTO: 94.6 FL — SIGNIFICANT CHANGE UP (ref 80–100)
MONOCYTES # BLD AUTO: 0.6 K/UL — SIGNIFICANT CHANGE UP (ref 0–0.9)
MONOCYTES NFR BLD AUTO: 15.6 % — HIGH (ref 2–14)
NEUTROPHILS # BLD AUTO: 2.3 K/UL — SIGNIFICANT CHANGE UP (ref 1.8–7.4)
NEUTROPHILS NFR BLD AUTO: 61.4 % — SIGNIFICANT CHANGE UP (ref 43–77)
PHOSPHATE SERPL-MCNC: 3.6 MG/DL — SIGNIFICANT CHANGE UP (ref 2.5–4.5)
PLATELET # BLD AUTO: 119 K/UL — LOW (ref 150–400)
POTASSIUM SERPL-MCNC: 3.4 MMOL/L — LOW (ref 3.5–5.3)
POTASSIUM SERPL-SCNC: 3.4 MMOL/L — LOW (ref 3.5–5.3)
PROT SERPL-MCNC: 6.2 G/DL — SIGNIFICANT CHANGE UP (ref 6–8.3)
PROTHROM AB SERPL-ACNC: 13.6 SEC — HIGH (ref 9.8–12.7)
RBC # BLD: 4.11 M/UL — LOW (ref 4.2–5.8)
RBC # FLD: 12.2 % — SIGNIFICANT CHANGE UP (ref 10.3–14.5)
SODIUM SERPL-SCNC: 140 MMOL/L — SIGNIFICANT CHANGE UP (ref 135–145)
WBC # BLD: 3.7 K/UL — LOW (ref 3.8–10.5)
WBC # FLD AUTO: 3.7 K/UL — LOW (ref 3.8–10.5)

## 2018-08-23 PROCEDURE — 99233 SBSQ HOSP IP/OBS HIGH 50: CPT | Mod: GC

## 2018-08-23 PROCEDURE — 93010 ELECTROCARDIOGRAM REPORT: CPT

## 2018-08-23 PROCEDURE — 71045 X-RAY EXAM CHEST 1 VIEW: CPT | Mod: 26

## 2018-08-23 PROCEDURE — 99232 SBSQ HOSP IP/OBS MODERATE 35: CPT

## 2018-08-23 RX ORDER — SODIUM CHLORIDE 9 MG/ML
500 INJECTION INTRAMUSCULAR; INTRAVENOUS; SUBCUTANEOUS ONCE
Qty: 0 | Refills: 0 | Status: COMPLETED | OUTPATIENT
Start: 2018-08-23 | End: 2018-08-23

## 2018-08-23 RX ORDER — VALPROIC ACID (AS SODIUM SALT) 250 MG/5ML
750 SOLUTION, ORAL ORAL EVERY 8 HOURS
Qty: 0 | Refills: 0 | Status: DISCONTINUED | OUTPATIENT
Start: 2018-08-23 | End: 2018-08-26

## 2018-08-23 RX ORDER — ACETAMINOPHEN 500 MG
1000 TABLET ORAL ONCE
Qty: 0 | Refills: 0 | Status: COMPLETED | OUTPATIENT
Start: 2018-08-23 | End: 2018-08-23

## 2018-08-23 RX ORDER — POTASSIUM CHLORIDE 20 MEQ
40 PACKET (EA) ORAL ONCE
Qty: 0 | Refills: 0 | Status: COMPLETED | OUTPATIENT
Start: 2018-08-23 | End: 2018-08-23

## 2018-08-23 RX ORDER — IPRATROPIUM/ALBUTEROL SULFATE 18-103MCG
3 AEROSOL WITH ADAPTER (GRAM) INHALATION EVERY 6 HOURS
Qty: 0 | Refills: 0 | Status: DISCONTINUED | OUTPATIENT
Start: 2018-08-23 | End: 2018-08-24

## 2018-08-23 RX ADMIN — Medication 750 MILLIGRAM(S): at 14:02

## 2018-08-23 RX ADMIN — Medication 40 MILLIEQUIVALENT(S): at 01:22

## 2018-08-23 RX ADMIN — Medication 100 MILLIGRAM(S): at 11:55

## 2018-08-23 RX ADMIN — Medication 3 MILLILITER(S): at 23:39

## 2018-08-23 RX ADMIN — PANTOPRAZOLE SODIUM 40 MILLIGRAM(S): 20 TABLET, DELAYED RELEASE ORAL at 05:18

## 2018-08-23 RX ADMIN — Medication 5 MILLIGRAM(S): at 17:40

## 2018-08-23 RX ADMIN — Medication 5 MILLIGRAM(S): at 23:51

## 2018-08-23 RX ADMIN — Medication 5 MILLIGRAM(S): at 11:52

## 2018-08-23 RX ADMIN — DEXMEDETOMIDINE HYDROCHLORIDE IN 0.9% SODIUM CHLORIDE 1.81 MICROGRAM(S)/KG/HR: 4 INJECTION INTRAVENOUS at 09:02

## 2018-08-23 RX ADMIN — PANTOPRAZOLE SODIUM 40 MILLIGRAM(S): 20 TABLET, DELAYED RELEASE ORAL at 17:40

## 2018-08-23 RX ADMIN — MAGNESIUM OXIDE 400 MG ORAL TABLET 200 MILLIGRAM(S): 241.3 TABLET ORAL at 17:39

## 2018-08-23 RX ADMIN — HALOPERIDOL DECANOATE 5 MILLIGRAM(S): 100 INJECTION INTRAMUSCULAR at 05:17

## 2018-08-23 RX ADMIN — Medication 400 MILLIGRAM(S): at 23:07

## 2018-08-23 RX ADMIN — Medication 1 MILLIGRAM(S): at 11:56

## 2018-08-23 RX ADMIN — POLYETHYLENE GLYCOL 3350 17 GRAM(S): 17 POWDER, FOR SOLUTION ORAL at 11:51

## 2018-08-23 RX ADMIN — Medication 400 MILLIGRAM(S): at 16:12

## 2018-08-23 RX ADMIN — HALOPERIDOL DECANOATE 5 MILLIGRAM(S): 100 INJECTION INTRAMUSCULAR at 23:52

## 2018-08-23 RX ADMIN — Medication 1 TABLET(S): at 11:54

## 2018-08-23 RX ADMIN — Medication 5 MILLIGRAM(S): at 00:15

## 2018-08-23 RX ADMIN — MAGNESIUM OXIDE 400 MG ORAL TABLET 200 MILLIGRAM(S): 241.3 TABLET ORAL at 09:01

## 2018-08-23 RX ADMIN — SODIUM CHLORIDE 1000 MILLILITER(S): 9 INJECTION INTRAMUSCULAR; INTRAVENOUS; SUBCUTANEOUS at 20:01

## 2018-08-23 RX ADMIN — Medication 2 MILLIGRAM(S): at 06:26

## 2018-08-23 RX ADMIN — CEFTRIAXONE 100 GRAM(S): 500 INJECTION, POWDER, FOR SOLUTION INTRAMUSCULAR; INTRAVENOUS at 01:19

## 2018-08-23 RX ADMIN — HALOPERIDOL DECANOATE 5 MILLIGRAM(S): 100 INJECTION INTRAMUSCULAR at 17:40

## 2018-08-23 RX ADMIN — HALOPERIDOL DECANOATE 5 MILLIGRAM(S): 100 INJECTION INTRAMUSCULAR at 11:56

## 2018-08-23 RX ADMIN — OLANZAPINE 5 MILLIGRAM(S): 15 TABLET, FILM COATED ORAL at 20:27

## 2018-08-23 RX ADMIN — ENOXAPARIN SODIUM 40 MILLIGRAM(S): 100 INJECTION SUBCUTANEOUS at 11:55

## 2018-08-23 RX ADMIN — Medication 750 MILLIGRAM(S): at 21:24

## 2018-08-23 RX ADMIN — Medication 5 MILLIGRAM(S): at 05:18

## 2018-08-23 NOTE — PROGRESS NOTE BEHAVIORAL HEALTH - NSBHCONSULTMEDS_PSY_A_CORE FT
1. C/w Haldol 5mg PO/IV q6h standing.  F/u daily EKG for QTc<500.  Can consider starting VPA if delirium/agitation persists.    2. PRN: Zyprexa 5mg PO/IM q6h PRN agitation.    3. Would attempt to minimize use of BDZs for now.    4. CL psych will f/u.

## 2018-08-23 NOTE — PROGRESS NOTE BEHAVIORAL HEALTH - NSBHCHARTREVIEWLAB_PSY_A_CORE FT
13.3   3.7   )-----------( 119      ( 23 Aug 2018 00:14 )             38.8     08-23    140  |  104  |  4<L>  ----------------------------<  147<H>  3.4<L>   |  25  |  0.61    Ca    9.2      23 Aug 2018 00:14  Phos  3.6     08-23  Mg     1.9     08-23    TPro  6.2  /  Alb  3.4  /  TBili  0.4  /  DBili  x   /  AST  22  /  ALT  21  /  AlkPhos  39<L>  08-23

## 2018-08-23 NOTE — PROGRESS NOTE ADULT - ATTENDING COMMENTS
Persistent encephalopathy however doubt going through withdrawal reaction at this time  Responds appropriately when awake   Gets agitated intermittently but not confused  Will use more antipsychotics  Avoid benzos, barbiturates, d/c Precedex  Consider valproate if agitation uncontrolled with standing Haldol and prn Zyprexa  UCx neg, d/c abx  Afebrile and HD stable  Mental status not appropriate for po diet, continue TF  Aspiration precautions, add BDs, continue chest PT  Continue 1:1 observation

## 2018-08-23 NOTE — PROGRESS NOTE BEHAVIORAL HEALTH - NSBHCHARTREVIEWVS_PSY_A_CORE FT
T(C): 36.2 (08-23-18 @ 12:00), Max: 36.8 (08-22-18 @ 20:00)  HR: 83 (08-23-18 @ 12:00) (69 - 104)  BP: 102/59 (08-23-18 @ 11:00) (93/50 - 174/105)  RR: 22 (08-23-18 @ 12:00) (12 - 28)  SpO2: 100% (08-23-18 @ 12:00) (95% - 100%)  Wt(kg): --
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
## 2018-08-23 NOTE — PROGRESS NOTE BEHAVIORAL HEALTH - NSBHFUPINTERVALHXFT_PSY_A_CORE
Pt remains sedated, on Precedex, s/p Ativan PRN this morning for agitation per RN.  Upon holding Precedex, pt becomes slightly more responsive but still not meaningfully engaging in conversation, intermittently can follow a simple command.
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
## 2018-08-23 NOTE — PROGRESS NOTE ADULT - MENTAL STATUS
Lethargic once treated for agitation with episodes of aggressive outbursts. +PERRL, no neuro deficit, +facial symmetry, TORRES with bilaterally equal strength
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
## 2018-08-23 NOTE — PROGRESS NOTE ADULT - ASSESSMENT
Assessment and Plan:   		  38 M with hx alcohol abuse initially admitted with alcoholic pancreatitis and then developed DTs requiring high dose phenobarb, maximum doses of Precedex gtt, Haldol ATC, PRN Ativan, Zyprexa IM PRN, and Zyprexa.  He was afebrile the night before last, had a +UA and now being treated empirically for urosepsis vs / and pna.     #Neuro: PTSD, ETOH Abuse, paranoia, violent outbursts  - remains on a Precedex gtt- start titrating off as tolerated  -c/w Haldol ATC   -c/w Zyprexa and Ativan PRN  -consider starting Depakote  - f/u by psyche- pt needs a plan for continued treatment that is sustainable.  - c/w 1:1 Observation  - c/w restraints as pt is a danger to himself and others    #CV- Hx HTN, Remains hemodynamically stable  - C/W Lopressor IV   - c/w daily EKG- f/u QTc in the setting of the pt being treated with haldol and zyprexa    #Pulm: Respiratory status stable  -O2 Sat's remain stable of R/A- c/w to monitor  -chest PT if pt permits without physically striking staff member  -DB&C - if pt can control himself from trying to spit on staff members  -aspiration precautions    #GI- Acute Pancreatitis resolved / Lipase & Amylase WNL  -Dilaudid PRN if pt c/o pain  -c/w Protonix   -c/w Zofran PRN  -c/w tube feeds  -c/w daily trending of LFT's, Lipase, Amylase, LDH, consider rechecking triglycerides     #Renal: Rhabdomyolysis resolving  - f/u CK's  - BMP daily  -trend u/o- moon d/c yesterday- pt c/w voiding freely      #Hem   - Platelet count decreased by less then 30% after starting LOvenox  -  4Ts score 2< 5% change  - Hold Lovenox for today  - PAS     #ID- +U/A, afebrile overnight  - f/u Ucx  -started on empirically on Ceftriaxone 8/21 for suspected UTI/and/or PNA        # DVT ppx:  - PAS  - restarted Lovenox Assessment and Plan:   		  38 M with hx alcohol abuse initially admitted with alcoholic pancreatitis and then developed DTs requiring high dose phenobarb, maximum doses of Precedex gtt, Haldol ATC, PRN Ativan, Zyprexa IM PRN, and Zyprexa.  He was afebrile the night before last, had a +UA and now being treated empirically for urosepsis vs / and pna.     #Neuro: PTSD, ETOH Abuse, paranoia, violent outbursts  - remains on a Precedex gtt- start titrating off as tolerated  -c/w Haldol ATC   -c/w Zyprexa and Ativan PRN  -consider starting Depakote  - f/u by psyche- pt needs a plan for continued treatment that is sustainable.  - c/w 1:1 Observation  - c/w restraints as pt is a danger to himself and others    #CV- Hx HTN, Remains hemodynamically stable  - C/W Lopressor IV   - c/w daily EKG- f/u QTc in the setting of the pt being treated with haldol and zyprexa    #Pulm: Respiratory status stable  -O2 Sat's remain stable of R/A- c/w to monitor  -chest PT if pt permits without physically striking staff member  -DB&C - if pt can control himself from trying to spit on staff members  -aspiration precautions    #GI- Acute Pancreatitis resolved / Lipase & Amylase WNL  -Dilaudid PRN if pt c/o pain  -c/w Protonix   -c/w Zofran PRN  -c/w tube feeds  -c/w daily trending of LFT's, Lipase, Amylase, LDH, consider rechecking triglycerides     #Renal: Rhabdomyolysis resolving  - f/u CK's  - BMP daily  -trend u/o- moon d/c yesterday- pt c/w voiding freely    #Hem - Thrombocytopenia resolving  -c/w Lovenox for DVT prophylaxis  -PAS BLE    #ID- +U/A, afebrile overnight  - f/u Ucx  -started on empirically on Ceftriaxone 8/21 for suspected UTI/and/or PNA Assessment and Plan:   		  38 M with hx alcohol abuse initially admitted with alcoholic pancreatitis and then developed DTs requiring high dose phenobarb, maximum doses of Precedex gtt, Haldol ATC, PRN Ativan, Zyprexa IM PRN, and Zyprexa.  He was afebrile the night before last, had a +UA and now being treated empirically for urosepsis vs / and pna.     #Neuro: PTSD, ETOH Abuse, paranoia, violent outbursts  - remains on a Precedex gtt- start titrating off as tolerated  -c/w Haldol ATC   -c/w Zyprexa and Ativan PRN  -consider starting Depakote  - f/u by psyche- pt needs a plan for continued treatment that is sustainable.  - c/w 1:1 Observation  - c/w restraints as pt is a danger to himself and others    #CV- Hx HTN, Remains hemodynamically stable  - C/W Lopressor IV   - c/w daily EKG- f/u QTc in the setting of the pt being treated with haldol and zyprexa    #Pulm: Respiratory status stable  -O2 Sat's remain stable of R/A- c/w to monitor  -chest PT if pt permits without physically striking staff member  -DB&C - if pt can control himself from trying to spit on staff members  -aspiration precautions    #GI- Acute Pancreatitis resolved / Lipase & Amylase WNL  -Dilaudid PRN if pt c/o pain  -c/w Protonix   -c/w Zofran PRN  -c/w tube feeds  -c/w daily trending of LFT's, Lipase, Amylase, LDH, consider rechecking triglycerides     #Renal: Rhabdomyolysis resolving  - f/u CK's  - BMP daily  -trend u/o- moon d/c yesterday- pt c/w voiding freely    #Hem - Thrombocytopenia resolving  -c/w Lovenox for DVT prophylaxis  -PAS BLE    #ID- +U/A / Ucx and BC x2 neg- started empirically on Ceftriaxone 8/21 for PNA and suspicion of UTI  -started on empirically on Ceftriaxone 8/21 for suspected UTI/and/or PNA- c/w for a 5 day course  -c/w moon in the setting of urinary retention Assessment and Plan:   		  38 M with hx alcohol abuse initially admitted with alcoholic pancreatitis and then developed DTs requiring high dose phenobarb, maximum doses of Precedex gtt, Haldol ATC, PRN Ativan, Zyprexa IM PRN, and Zyprexa.  He was afebrile the night before last, had a +UA and now being treated empirically for urosepsis vs / and pna.     #Neuro: PTSD, ETOH Abuse, paranoia, violent outbursts  - remains on a Precedex gtt- start titrating off as tolerated  -c/w Haldol ATC   -c/w Zyprexa PRN  -d/c Ativan for now  -consider starting Depakote      Trend LFT's in the setting of depakote  - f/u by psyche- pt needs a plan for continued treatment that is sustainable.  - c/w 1:1 Observation  - c/w restraints as pt is a danger to himself and others    #CV- Hx HTN, Remains hemodynamically stable  - C/W Lopressor IV   - c/w daily EKG- f/u QTc in the setting of the pt being treated with haldol and zyprexa    #Pulm: Respiratory status stable  -O2 Sat's remain stable of R/A- c/w to monitor  -chest PT if pt permits without physically striking staff member  -DB&C - if pt can control himself from trying to spit on staff members  -aspiration precautions    #GI- Acute Pancreatitis resolved / Lipase & Amylase WNL  -Dilaudid PRN if pt c/o pain  -c/w Protonix   -c/w Zofran PRN  -c/w tube feeds  -c/w daily trending of LFT's, Lipase, Amylase, LDH, consider rechecking triglycerides     #Renal: Rhabdomyolysis resolving  - f/u CK's  - BMP daily  -trend u/o- moon d/c yesterday- pt c/w voiding freely    #Hem - Thrombocytopenia resolving  -c/w Lovenox for DVT prophylaxis  -PAS BLE    #ID- +U/A / Ucx and BC x2 neg- started empirically on Ceftriaxone 8/21   -D/C Ceftriaxone started on empirically on Ceftriaxone 8/21 for suspected UTI   -c/w moon in the setting of urinary retention- strict moon care  -will start empiric coverage for aspiration PNA if the pt exhibits any indication of infection

## 2018-08-24 DIAGNOSIS — R50.9 FEVER, UNSPECIFIED: ICD-10-CM

## 2018-08-24 LAB
ALBUMIN SERPL ELPH-MCNC: 3.8 G/DL — SIGNIFICANT CHANGE UP (ref 3.3–5)
ALP SERPL-CCNC: 62 U/L — SIGNIFICANT CHANGE UP (ref 40–120)
ALT FLD-CCNC: 33 U/L — SIGNIFICANT CHANGE UP (ref 10–45)
ANION GAP SERPL CALC-SCNC: 16 MMOL/L — SIGNIFICANT CHANGE UP (ref 5–17)
APPEARANCE UR: CLEAR — SIGNIFICANT CHANGE UP
APTT BLD: 32.6 SEC — SIGNIFICANT CHANGE UP (ref 27.5–37.4)
AST SERPL-CCNC: 37 U/L — SIGNIFICANT CHANGE UP (ref 10–40)
BACTERIA # UR AUTO: ABNORMAL /HPF
BILIRUB SERPL-MCNC: 0.4 MG/DL — SIGNIFICANT CHANGE UP (ref 0.2–1.2)
BILIRUB UR-MCNC: NEGATIVE — SIGNIFICANT CHANGE UP
BUN SERPL-MCNC: <4 MG/DL — LOW (ref 7–23)
CALCIUM SERPL-MCNC: 9.8 MG/DL — SIGNIFICANT CHANGE UP (ref 8.4–10.5)
CHLORIDE SERPL-SCNC: 103 MMOL/L — SIGNIFICANT CHANGE UP (ref 96–108)
CK SERPL-CCNC: 121 U/L — SIGNIFICANT CHANGE UP (ref 30–200)
CO2 SERPL-SCNC: 24 MMOL/L — SIGNIFICANT CHANGE UP (ref 22–31)
COLOR SPEC: YELLOW — SIGNIFICANT CHANGE UP
CREAT SERPL-MCNC: 0.74 MG/DL — SIGNIFICANT CHANGE UP (ref 0.5–1.3)
DIFF PNL FLD: ABNORMAL
GAS PNL BLDA: SIGNIFICANT CHANGE UP
GLUCOSE BLDC GLUCOMTR-MCNC: 107 MG/DL — HIGH (ref 70–99)
GLUCOSE BLDC GLUCOMTR-MCNC: 142 MG/DL — HIGH (ref 70–99)
GLUCOSE BLDC GLUCOMTR-MCNC: 70 MG/DL — SIGNIFICANT CHANGE UP (ref 70–99)
GLUCOSE BLDC GLUCOMTR-MCNC: 97 MG/DL — SIGNIFICANT CHANGE UP (ref 70–99)
GLUCOSE SERPL-MCNC: 106 MG/DL — HIGH (ref 70–99)
GLUCOSE UR QL: NEGATIVE — SIGNIFICANT CHANGE UP
GRAM STN FLD: SIGNIFICANT CHANGE UP
HCT VFR BLD CALC: 42.2 % — SIGNIFICANT CHANGE UP (ref 39–50)
HGB BLD-MCNC: 14.2 G/DL — SIGNIFICANT CHANGE UP (ref 13–17)
INR BLD: 1.3 RATIO — HIGH (ref 0.88–1.16)
KETONES UR-MCNC: ABNORMAL
LEUKOCYTE ESTERASE UR-ACNC: NEGATIVE — SIGNIFICANT CHANGE UP
MAGNESIUM SERPL-MCNC: 1.9 MG/DL — SIGNIFICANT CHANGE UP (ref 1.6–2.6)
MCHC RBC-ENTMCNC: 31.6 PG — SIGNIFICANT CHANGE UP (ref 27–34)
MCHC RBC-ENTMCNC: 33.6 GM/DL — SIGNIFICANT CHANGE UP (ref 32–36)
MCV RBC AUTO: 94 FL — SIGNIFICANT CHANGE UP (ref 80–100)
NITRITE UR-MCNC: NEGATIVE — SIGNIFICANT CHANGE UP
PH UR: 8 — SIGNIFICANT CHANGE UP (ref 5–8)
PHOSPHATE SERPL-MCNC: 3.3 MG/DL — SIGNIFICANT CHANGE UP (ref 2.5–4.5)
PLATELET # BLD AUTO: 175 K/UL — SIGNIFICANT CHANGE UP (ref 150–400)
POTASSIUM SERPL-MCNC: 3.6 MMOL/L — SIGNIFICANT CHANGE UP (ref 3.5–5.3)
POTASSIUM SERPL-SCNC: 3.6 MMOL/L — SIGNIFICANT CHANGE UP (ref 3.5–5.3)
PROT SERPL-MCNC: 7 G/DL — SIGNIFICANT CHANGE UP (ref 6–8.3)
PROT UR-MCNC: SIGNIFICANT CHANGE UP
PROTHROM AB SERPL-ACNC: 14.1 SEC — HIGH (ref 9.8–12.7)
RBC # BLD: 4.49 M/UL — SIGNIFICANT CHANGE UP (ref 4.2–5.8)
RBC # FLD: 12.2 % — SIGNIFICANT CHANGE UP (ref 10.3–14.5)
RBC CASTS # UR COMP ASSIST: ABNORMAL /HPF (ref 0–2)
SODIUM SERPL-SCNC: 143 MMOL/L — SIGNIFICANT CHANGE UP (ref 135–145)
SP GR SPEC: 1.02 — SIGNIFICANT CHANGE UP (ref 1.01–1.02)
SPECIMEN SOURCE: SIGNIFICANT CHANGE UP
UROBILINOGEN FLD QL: NEGATIVE — SIGNIFICANT CHANGE UP
WBC # BLD: 5.1 K/UL — SIGNIFICANT CHANGE UP (ref 3.8–10.5)
WBC # FLD AUTO: 5.1 K/UL — SIGNIFICANT CHANGE UP (ref 3.8–10.5)
WBC UR QL: ABNORMAL /HPF (ref 0–5)

## 2018-08-24 PROCEDURE — 93010 ELECTROCARDIOGRAM REPORT: CPT

## 2018-08-24 PROCEDURE — 99291 CRITICAL CARE FIRST HOUR: CPT

## 2018-08-24 PROCEDURE — 99232 SBSQ HOSP IP/OBS MODERATE 35: CPT

## 2018-08-24 RX ORDER — DEXTROSE 50 % IN WATER 50 %
25 SYRINGE (ML) INTRAVENOUS ONCE
Qty: 0 | Refills: 0 | Status: COMPLETED | OUTPATIENT
Start: 2018-08-24 | End: 2018-08-24

## 2018-08-24 RX ORDER — PIPERACILLIN AND TAZOBACTAM 4; .5 G/20ML; G/20ML
3.38 INJECTION, POWDER, LYOPHILIZED, FOR SOLUTION INTRAVENOUS EVERY 8 HOURS
Qty: 0 | Refills: 0 | Status: DISCONTINUED | OUTPATIENT
Start: 2018-08-24 | End: 2018-08-25

## 2018-08-24 RX ORDER — IPRATROPIUM/ALBUTEROL SULFATE 18-103MCG
3 AEROSOL WITH ADAPTER (GRAM) INHALATION EVERY 6 HOURS
Qty: 0 | Refills: 0 | Status: DISCONTINUED | OUTPATIENT
Start: 2018-08-24 | End: 2018-08-31

## 2018-08-24 RX ORDER — ACETAMINOPHEN 500 MG
1000 TABLET ORAL ONCE
Qty: 0 | Refills: 0 | Status: COMPLETED | OUTPATIENT
Start: 2018-08-24 | End: 2018-08-24

## 2018-08-24 RX ORDER — HALOPERIDOL DECANOATE 100 MG/ML
5 INJECTION INTRAMUSCULAR ONCE
Qty: 0 | Refills: 0 | Status: COMPLETED | OUTPATIENT
Start: 2018-08-24 | End: 2018-08-24

## 2018-08-24 RX ORDER — HALOPERIDOL DECANOATE 100 MG/ML
5 INJECTION INTRAMUSCULAR EVERY 12 HOURS
Qty: 0 | Refills: 0 | Status: DISCONTINUED | OUTPATIENT
Start: 2018-08-24 | End: 2018-08-26

## 2018-08-24 RX ORDER — PIPERACILLIN AND TAZOBACTAM 4; .5 G/20ML; G/20ML
3.38 INJECTION, POWDER, LYOPHILIZED, FOR SOLUTION INTRAVENOUS ONCE
Qty: 0 | Refills: 0 | Status: COMPLETED | OUTPATIENT
Start: 2018-08-24 | End: 2018-08-24

## 2018-08-24 RX ADMIN — OLANZAPINE 5 MILLIGRAM(S): 15 TABLET, FILM COATED ORAL at 02:38

## 2018-08-24 RX ADMIN — HALOPERIDOL DECANOATE 5 MILLIGRAM(S): 100 INJECTION INTRAMUSCULAR at 03:10

## 2018-08-24 RX ADMIN — HALOPERIDOL DECANOATE 5 MILLIGRAM(S): 100 INJECTION INTRAMUSCULAR at 18:24

## 2018-08-24 RX ADMIN — PIPERACILLIN AND TAZOBACTAM 25 GRAM(S): 4; .5 INJECTION, POWDER, LYOPHILIZED, FOR SOLUTION INTRAVENOUS at 16:30

## 2018-08-24 RX ADMIN — Medication 1 MILLIGRAM(S): at 04:56

## 2018-08-24 RX ADMIN — PANTOPRAZOLE SODIUM 40 MILLIGRAM(S): 20 TABLET, DELAYED RELEASE ORAL at 05:34

## 2018-08-24 RX ADMIN — POLYETHYLENE GLYCOL 3350 17 GRAM(S): 17 POWDER, FOR SOLUTION ORAL at 11:49

## 2018-08-24 RX ADMIN — Medication 25 MILLILITER(S): at 18:40

## 2018-08-24 RX ADMIN — Medication 5 MILLIGRAM(S): at 18:34

## 2018-08-24 RX ADMIN — ENOXAPARIN SODIUM 40 MILLIGRAM(S): 100 INJECTION SUBCUTANEOUS at 11:49

## 2018-08-24 RX ADMIN — Medication 750 MILLIGRAM(S): at 21:34

## 2018-08-24 RX ADMIN — Medication 750 MILLIGRAM(S): at 05:34

## 2018-08-24 RX ADMIN — Medication 3 MILLILITER(S): at 05:44

## 2018-08-24 RX ADMIN — PANTOPRAZOLE SODIUM 40 MILLIGRAM(S): 20 TABLET, DELAYED RELEASE ORAL at 18:24

## 2018-08-24 RX ADMIN — Medication 100 MILLIGRAM(S): at 11:49

## 2018-08-24 RX ADMIN — Medication 5 MILLIGRAM(S): at 11:51

## 2018-08-24 RX ADMIN — SENNA PLUS 10 MILLILITER(S): 8.6 TABLET ORAL at 21:34

## 2018-08-24 RX ADMIN — PIPERACILLIN AND TAZOBACTAM 200 GRAM(S): 4; .5 INJECTION, POWDER, LYOPHILIZED, FOR SOLUTION INTRAVENOUS at 08:15

## 2018-08-24 RX ADMIN — Medication 750 MILLIGRAM(S): at 13:16

## 2018-08-24 RX ADMIN — HALOPERIDOL DECANOATE 5 MILLIGRAM(S): 100 INJECTION INTRAMUSCULAR at 05:55

## 2018-08-24 RX ADMIN — MAGNESIUM OXIDE 400 MG ORAL TABLET 200 MILLIGRAM(S): 241.3 TABLET ORAL at 18:34

## 2018-08-24 RX ADMIN — MAGNESIUM OXIDE 400 MG ORAL TABLET 200 MILLIGRAM(S): 241.3 TABLET ORAL at 08:18

## 2018-08-24 RX ADMIN — Medication 5 MILLIGRAM(S): at 05:34

## 2018-08-24 RX ADMIN — Medication 1 TABLET(S): at 11:49

## 2018-08-24 RX ADMIN — Medication 1 MILLIGRAM(S): at 11:49

## 2018-08-24 RX ADMIN — Medication 400 MILLIGRAM(S): at 07:45
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
## 2018-08-24 NOTE — PROGRESS NOTE ADULT - SUBJECTIVE AND OBJECTIVE BOX
INTERVAL HPI/OVERNIGHT EVENTS: remains agitated, having significant sputum production, low grade temps, on NG feeds, LFTs amylase/lipase normal    MEDICATIONS  (STANDING):  ALBUTerol/ipratropium for Nebulization 3 milliLiter(s) Nebulizer every 6 hours  dexmedetomidine Infusion 0.1 MICROgram(s)/kG/Hr (1.815 mL/Hr) IV Continuous <Continuous>  enoxaparin Injectable 40 milliGRAM(s) SubCutaneous daily  folic acid 1 milliGRAM(s) Oral daily  haloperidol    Injectable 5 milliGRAM(s) IV Push every 6 hours  magnesium oxide 200 milliGRAM(s) Oral two times a day with meals  metoprolol tartrate Injectable 5 milliGRAM(s) IV Push every 6 hours  multivitamin 1 Tablet(s) Oral daily  pantoprazole  Injectable 40 milliGRAM(s) IV Push two times a day  piperacillin/tazobactam IVPB. 3.375 Gram(s) IV Intermittent every 8 hours  polyethylene glycol 3350 17 Gram(s) Oral daily  senna Syrup 10 milliLiter(s) Oral at bedtime  thiamine 100 milliGRAM(s) Oral daily  valproic  acid Syrup 750 milliGRAM(s) Oral every 8 hours    MEDICATIONS  (PRN):  OLANZapine Injectable 5 milliGRAM(s) IntraMuscular every 6 hours PRN Agitaition  ondansetron Injectable 4 milliGRAM(s) IV Push every 8 hours PRN Nausea      Allergies    No Known Allergies    Intolerances            PHYSICAL EXAM:   Vital Signs:  Vital Signs Last 24 Hrs  T(C): 37.6 (24 Aug 2018 08:24), Max: 38.3 (23 Aug 2018 17:30)  T(F): 99.6 (24 Aug 2018 08:24), Max: 101 (23 Aug 2018 17:30)  HR: 110 (24 Aug 2018 08:24) (70 - 128)  BP: 164/90 (24 Aug 2018 08:00) (93/50 - 181/85)  BP(mean): 116 (24 Aug 2018 08:00) (64 - 128)  RR: 26 (24 Aug 2018 08:24) (15 - 36)  SpO2: 94% (24 Aug 2018 08:24) (86% - 100%)  Daily     Daily Weight in k.4 (24 Aug 2018 04:00)    GENERAL:  no distress  HEENT:  NC/AT,  anicteric  CHEST:   no increased effort, breath sounds clear  HEART:  Regular rhythm  ABDOMEN:  Soft, mild epigastric tenderness, non-distended, normoactive bowel sounds,  no masses ,no hepato-splenomegaly,  EXTEREMITIES:  no cyanosis      LABS:                        14.2   5.1   )-----------( 175      ( 24 Aug 2018 00:38 )             42.2     08-    143  |  103  |  <4<L>  ----------------------------<  106<H>  3.6   |  24  |  0.74    Ca    9.8      24 Aug 2018 00:38  Phos  3.3     -  Mg     1.9     -    TPro  7.0  /  Alb  3.8  /  TBili  0.4  /  DBili  x   /  AST  37  /  ALT  33  /  AlkPhos  62  08-24    PT/INR - ( 24 Aug 2018 00:37 )   PT: 14.1 sec;   INR: 1.30 ratio         PTT - ( 24 Aug 2018 00:37 )  PTT:32.6 sec  Urinalysis Basic - ( 24 Aug 2018 00:37 )    Color: Yellow / Appearance: Clear / S.024 / pH: x  Gluc: x / Ketone: Trace  / Bili: Negative / Urobili: Negative   Blood: x / Protein: Trace / Nitrite: Negative   Leuk Esterase: Negative / RBC: 10-25 /HPF / WBC 5-10 /HPF   Sq Epi: x / Non Sq Epi: x / Bacteria: Few /HPF        RADIOLOGY & ADDITIONAL TESTS:
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
## 2018-08-24 NOTE — PROGRESS NOTE BEHAVIORAL HEALTH - NSBHFUPINTERVALHXFT_PSY_A_CORE
Pt slightly more responsive as compared to yesterday, although remains lethargic, required PRN Zyprexa/Ativan this morning, started on VPA, also receiving standing Haldol.  Can give his name, knows he is in a hospital, and gives the month/year.  Too drowsy to sustain prolonged interview.  Per RN, pt was awake last night, oriented x4, was showing pictures on his phone.

## 2018-08-24 NOTE — PROGRESS NOTE BEHAVIORAL HEALTH - NSBHCHARTREVIEWLAB_PSY_A_CORE FT
14.2   5.1   )-----------( 175      ( 24 Aug 2018 00:38 )             42.2         143  |  103  |  <4<L>  ----------------------------<  106<H>  3.6   |  24  |  0.74    Ca    9.8      24 Aug 2018 00:38  Phos  3.3       Mg     1.9         TPro  7.0  /  Alb  3.8  /  TBili  0.4  /  DBili  x   /  AST  37  /  ALT  33  /  AlkPhos  62      Urinalysis Basic - ( 24 Aug 2018 00:37 )    Color: Yellow / Appearance: Clear / S.024 / pH: x  Gluc: x / Ketone: Trace  / Bili: Negative / Urobili: Negative   Blood: x / Protein: Trace / Nitrite: Negative   Leuk Esterase: Negative / RBC: 10-25 /HPF / WBC 5-10 /HPF   Sq Epi: x / Non Sq Epi: x / Bacteria: Few /HPF
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
## 2018-08-24 NOTE — PROGRESS NOTE ADULT - ATTENDING COMMENTS
Improving encephalopathy, less sedated, intermittently agitated however don't think he is going through the withdrawal reaction  Likely developed aspiration pneumonia (fever, hypoxia, cough)     Minimize sedatives, will decrease Haldol to q12, use prn Zyprexa for uncontrolled agitation, continue valproate  Avoid benzos, phenobarb  Continue zosyn, f/u cx, HD stable, afebrile now  Add BDs, chest PT  Tolerating TF, hopefully we can start po diet if stays more awake  Restraints as needed  Continue 1:1    Patient critically ill, 35mins spent
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
## 2018-08-24 NOTE — PROGRESS NOTE ADULT - ASSESSMENT
resolved EtOH induced pancreatitis and hepatitis  fevers likely respiratory in origin  if no source found and fevers persist then would repeat abd/pelvis  Ct scan with contrast to r/o pancreatic necrosis although low clinical suspicion
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
## 2018-08-24 NOTE — PROGRESS NOTE BEHAVIORAL HEALTH - NSBHCHARTREVIEWVS_PSY_A_CORE FT
T(C): 37.6 (08-24-18 @ 08:24), Max: 38.3 (08-23-18 @ 17:30)  HR: 102 (08-24-18 @ 09:00) (83 - 128)  BP: 116/56 (08-24-18 @ 09:00) (115/72 - 181/85)  RR: 24 (08-24-18 @ 09:00) (15 - 36)  SpO2: 97% (08-24-18 @ 09:00) (86% - 100%)  Wt(kg): --

## 2018-08-24 NOTE — PROGRESS NOTE BEHAVIORAL HEALTH - NSBHCONSULTOBSREASON_PSY_A_CORE FT
1:1 per primary team for agitation/withdrawal
ENC while on Precedex, CO once pt coming off.  As pt has received loading dose of phenobarb 520mg today, would give 130mg bid for next two days - or defer to MICU.   No benzos
1:1 per primary team for agitation/withdrawal

## 2018-08-25 DIAGNOSIS — F10.231 ALCOHOL DEPENDENCE WITH WITHDRAWAL DELIRIUM: ICD-10-CM

## 2018-08-25 LAB
ALBUMIN SERPL ELPH-MCNC: 4 G/DL — SIGNIFICANT CHANGE UP (ref 3.3–5)
ALP SERPL-CCNC: 57 U/L — SIGNIFICANT CHANGE UP (ref 40–120)
ALT FLD-CCNC: 28 U/L — SIGNIFICANT CHANGE UP (ref 10–45)
AMYLASE P1 CFR SERPL: 133 U/L — HIGH (ref 25–125)
ANION GAP SERPL CALC-SCNC: 13 MMOL/L — SIGNIFICANT CHANGE UP (ref 5–17)
APTT BLD: 35.9 SEC — SIGNIFICANT CHANGE UP (ref 27.5–37.4)
AST SERPL-CCNC: 21 U/L — SIGNIFICANT CHANGE UP (ref 10–40)
BILIRUB SERPL-MCNC: 0.4 MG/DL — SIGNIFICANT CHANGE UP (ref 0.2–1.2)
BUN SERPL-MCNC: 8 MG/DL — SIGNIFICANT CHANGE UP (ref 7–23)
CALCIUM SERPL-MCNC: 9.8 MG/DL — SIGNIFICANT CHANGE UP (ref 8.4–10.5)
CHLORIDE SERPL-SCNC: 99 MMOL/L — SIGNIFICANT CHANGE UP (ref 96–108)
CK SERPL-CCNC: 60 U/L — SIGNIFICANT CHANGE UP (ref 30–200)
CO2 SERPL-SCNC: 29 MMOL/L — SIGNIFICANT CHANGE UP (ref 22–31)
CREAT SERPL-MCNC: 0.84 MG/DL — SIGNIFICANT CHANGE UP (ref 0.5–1.3)
GLUCOSE BLDC GLUCOMTR-MCNC: 100 MG/DL — HIGH (ref 70–99)
GLUCOSE BLDC GLUCOMTR-MCNC: 117 MG/DL — HIGH (ref 70–99)
GLUCOSE BLDC GLUCOMTR-MCNC: 77 MG/DL — SIGNIFICANT CHANGE UP (ref 70–99)
GLUCOSE SERPL-MCNC: 120 MG/DL — HIGH (ref 70–99)
HCT VFR BLD CALC: 42.7 % — SIGNIFICANT CHANGE UP (ref 39–50)
HGB BLD-MCNC: 14.5 G/DL — SIGNIFICANT CHANGE UP (ref 13–17)
INR BLD: 1.27 RATIO — HIGH (ref 0.88–1.16)
LIDOCAIN IGE QN: 121 U/L — HIGH (ref 7–60)
MAGNESIUM SERPL-MCNC: 2.3 MG/DL — SIGNIFICANT CHANGE UP (ref 1.6–2.6)
MCHC RBC-ENTMCNC: 32.1 PG — SIGNIFICANT CHANGE UP (ref 27–34)
MCHC RBC-ENTMCNC: 33.8 GM/DL — SIGNIFICANT CHANGE UP (ref 32–36)
MCV RBC AUTO: 95 FL — SIGNIFICANT CHANGE UP (ref 80–100)
PHOSPHATE SERPL-MCNC: 3.9 MG/DL — SIGNIFICANT CHANGE UP (ref 2.5–4.5)
PLATELET # BLD AUTO: 192 K/UL — SIGNIFICANT CHANGE UP (ref 150–400)
POTASSIUM SERPL-MCNC: 3.7 MMOL/L — SIGNIFICANT CHANGE UP (ref 3.5–5.3)
POTASSIUM SERPL-SCNC: 3.7 MMOL/L — SIGNIFICANT CHANGE UP (ref 3.5–5.3)
PROT SERPL-MCNC: 7.1 G/DL — SIGNIFICANT CHANGE UP (ref 6–8.3)
PROTHROM AB SERPL-ACNC: 13.9 SEC — HIGH (ref 9.8–12.7)
RBC # BLD: 4.5 M/UL — SIGNIFICANT CHANGE UP (ref 4.2–5.8)
RBC # FLD: 12.6 % — SIGNIFICANT CHANGE UP (ref 10.3–14.5)
SODIUM SERPL-SCNC: 141 MMOL/L — SIGNIFICANT CHANGE UP (ref 135–145)
WBC # BLD: 6.9 K/UL — SIGNIFICANT CHANGE UP (ref 3.8–10.5)
WBC # FLD AUTO: 6.9 K/UL — SIGNIFICANT CHANGE UP (ref 3.8–10.5)

## 2018-08-25 PROCEDURE — 93010 ELECTROCARDIOGRAM REPORT: CPT

## 2018-08-25 PROCEDURE — 99232 SBSQ HOSP IP/OBS MODERATE 35: CPT

## 2018-08-25 PROCEDURE — 99233 SBSQ HOSP IP/OBS HIGH 50: CPT | Mod: GC

## 2018-08-25 RX ORDER — METOPROLOL TARTRATE 50 MG
25 TABLET ORAL EVERY 12 HOURS
Qty: 0 | Refills: 0 | Status: DISCONTINUED | OUTPATIENT
Start: 2018-08-25 | End: 2018-08-31

## 2018-08-25 RX ADMIN — Medication 25 MILLIGRAM(S): at 17:19

## 2018-08-25 RX ADMIN — Medication 1 TABLET(S): at 11:16

## 2018-08-25 RX ADMIN — Medication 1 MILLIGRAM(S): at 11:16

## 2018-08-25 RX ADMIN — MAGNESIUM OXIDE 400 MG ORAL TABLET 200 MILLIGRAM(S): 241.3 TABLET ORAL at 17:55

## 2018-08-25 RX ADMIN — HALOPERIDOL DECANOATE 5 MILLIGRAM(S): 100 INJECTION INTRAMUSCULAR at 17:56

## 2018-08-25 RX ADMIN — Medication 5 MILLIGRAM(S): at 05:53

## 2018-08-25 RX ADMIN — ENOXAPARIN SODIUM 40 MILLIGRAM(S): 100 INJECTION SUBCUTANEOUS at 11:17

## 2018-08-25 RX ADMIN — HALOPERIDOL DECANOATE 5 MILLIGRAM(S): 100 INJECTION INTRAMUSCULAR at 05:53

## 2018-08-25 RX ADMIN — Medication 750 MILLIGRAM(S): at 14:02

## 2018-08-25 RX ADMIN — Medication 1 TABLET(S): at 17:19

## 2018-08-25 RX ADMIN — Medication 750 MILLIGRAM(S): at 05:53

## 2018-08-25 RX ADMIN — Medication 5 MILLIGRAM(S): at 11:16

## 2018-08-25 RX ADMIN — Medication 5 MILLIGRAM(S): at 00:07

## 2018-08-25 RX ADMIN — PIPERACILLIN AND TAZOBACTAM 25 GRAM(S): 4; .5 INJECTION, POWDER, LYOPHILIZED, FOR SOLUTION INTRAVENOUS at 08:34

## 2018-08-25 RX ADMIN — MAGNESIUM OXIDE 400 MG ORAL TABLET 200 MILLIGRAM(S): 241.3 TABLET ORAL at 08:34

## 2018-08-25 RX ADMIN — Medication 750 MILLIGRAM(S): at 22:27

## 2018-08-25 RX ADMIN — PANTOPRAZOLE SODIUM 40 MILLIGRAM(S): 20 TABLET, DELAYED RELEASE ORAL at 05:53

## 2018-08-25 RX ADMIN — Medication 100 MILLIGRAM(S): at 11:17

## 2018-08-25 RX ADMIN — PIPERACILLIN AND TAZOBACTAM 25 GRAM(S): 4; .5 INJECTION, POWDER, LYOPHILIZED, FOR SOLUTION INTRAVENOUS at 00:07

## 2018-08-25 NOTE — PROGRESS NOTE BEHAVIORAL HEALTH - NSBHCHARTREVIEWLAB_PSY_A_CORE FT
14.5   6.9   )-----------( 192      ( 25 Aug 2018 07:00 )             42.7         143  |  103  |  <4<L>  ----------------------------<  106<H>  3.6   |  24  |  0.74    Ca    9.8      24 Aug 2018 00:38  Phos  3.3       Mg     1.9         TPro  7.0  /  Alb  3.8  /  TBili  0.4  /  DBili  x   /  AST  37  /  ALT  33  /  AlkPhos  62      Urinalysis Basic - ( 24 Aug 2018 00:37 )    Color: Yellow / Appearance: Clear / S.024 / pH: x  Gluc: x / Ketone: Trace  / Bili: Negative / Urobili: Negative   Blood: x / Protein: Trace / Nitrite: Negative   Leuk Esterase: Negative / RBC: 10-25 /HPF / WBC 5-10 /HPF   Sq Epi: x / Non Sq Epi: x / Bacteria: Few /HPF

## 2018-08-25 NOTE — PROGRESS NOTE ADULT - SUBJECTIVE AND OBJECTIVE BOX
INTERVAL HPI/OVERNIGHT EVENTS: OOB in chair, awake, conversant, significantly improved    MEDICATIONS  (STANDING):  enoxaparin Injectable 40 milliGRAM(s) SubCutaneous daily  folic acid 1 milliGRAM(s) Oral daily  haloperidol    Injectable 5 milliGRAM(s) IV Push every 12 hours  magnesium oxide 200 milliGRAM(s) Oral two times a day with meals  metoprolol tartrate Injectable 5 milliGRAM(s) IV Push every 6 hours  multivitamin 1 Tablet(s) Oral daily  pantoprazole  Injectable 40 milliGRAM(s) IV Push two times a day  piperacillin/tazobactam IVPB. 3.375 Gram(s) IV Intermittent every 8 hours  polyethylene glycol 3350 17 Gram(s) Oral daily  senna Syrup 10 milliLiter(s) Oral at bedtime  thiamine 100 milliGRAM(s) Oral daily  valproic  acid Syrup 750 milliGRAM(s) Oral every 8 hours    MEDICATIONS  (PRN):  ALBUTerol/ipratropium for Nebulization 3 milliLiter(s) Nebulizer every 6 hours PRN pna  OLANZapine Injectable 5 milliGRAM(s) IntraMuscular every 6 hours PRN Agitaition  ondansetron Injectable 4 milliGRAM(s) IV Push every 8 hours PRN Nausea      Allergies    No Known Allergies    Intolerances            PHYSICAL EXAM:   Vital Signs:  Vital Signs Last 24 Hrs  T(C): 37.1 (25 Aug 2018 11:00), Max: 37.3 (25 Aug 2018 00:00)  T(F): 98.7 (25 Aug 2018 11:00), Max: 99.1 (25 Aug 2018 00:00)  HR: 104 (25 Aug 2018 12:25) (90 - 114)  BP: 126/81 (25 Aug 2018 11:25) (109/66 - 194/132)  BP(mean): 99 (25 Aug 2018 11:25) (82 - 152)  RR: 23 (25 Aug 2018 12:25) (15 - 39)  SpO2: 99% (25 Aug 2018 12:25) (74% - 100%)  Daily     Daily Weight in k.9 (25 Aug 2018 04:00)    GENERAL:  no distress  HEENT:  NC/AT,  anicteric  CHEST:   no increased effort, breath sounds clear  HEART:  Regular rhythm  ABDOMEN:  Soft, non-tender, non-distended, normoactive bowel sounds,  no masses ,no hepato-splenomegaly, no signs of chronic liver disease  EXTEREMITIES:  no cyanosis      LABS:                        14.5   6.9   )-----------( 192      ( 25 Aug 2018 07:00 )             42.7         143  |  103  |  <4<L>  ----------------------------<  106<H>  3.6   |  24  |  0.74    Ca    9.8      24 Aug 2018 00:38  Phos  3.3       Mg     1.9         TPro  7.0  /  Alb  3.8  /  TBili  0.4  /  DBili  x   /  AST  37  /  ALT  33  /  AlkPhos  62      PT/INR - ( 24 Aug 2018 00:37 )   PT: 14.1 sec;   INR: 1.30 ratio         PTT - ( 24 Aug 2018 00:37 )  PTT:32.6 sec  Urinalysis Basic - ( 24 Aug 2018 00:37 )    Color: Yellow / Appearance: Clear / S.024 / pH: x  Gluc: x / Ketone: Trace  / Bili: Negative / Urobili: Negative   Blood: x / Protein: Trace / Nitrite: Negative   Leuk Esterase: Negative / RBC: 10-25 /HPF / WBC 5-10 /HPF   Sq Epi: x / Non Sq Epi: x / Bacteria: Few /HPF        RADIOLOGY & ADDITIONAL TESTS:

## 2018-08-25 NOTE — PROGRESS NOTE ADULT - ATTENDING COMMENTS
1. Aspiration pneumonia. Continue Zosyn.  2. Delirium . Initially from ETOH with drawl but now ? psych issues. Pt better on standing haldol and prn Zyprexa.
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
## 2018-08-25 NOTE — PROGRESS NOTE ADULT - PROBLEM SELECTOR PLAN 1
abstain from alcohol  use  psych follow up for further care  continue sedation as needed  weaning off sedation as tolerated

## 2018-08-25 NOTE — PROGRESS NOTE BEHAVIORAL HEALTH - NSBHCHARTREVIEWVS_PSY_A_CORE FT
T(C): 36.8 (08-25-18 @ 07:00), Max: 37.3 (08-25-18 @ 00:00)  HR: 104 (08-25-18 @ 11:00) (90 - 114)  BP: 113/79 (08-25-18 @ 09:00) (109/66 - 194/132)  RR: 27 (08-25-18 @ 11:00) (15 - 39)  SpO2: 97% (08-25-18 @ 09:00) (74% - 100%)  Wt(kg): --

## 2018-08-25 NOTE — PROGRESS NOTE BEHAVIORAL HEALTH - NSBHCONSULTMEDS_PSY_A_CORE FT
1. C/w Haldol 5mg PO q12h standing.  F/u daily EKG for QTc<500.  C/w VPA for now, monitor LFTs, amylase/lipase.    2. PRN: Zyprexa 5mg PO/IM q6h PRN agitation.  **Do not give IM Zyprexa within 3 hours of IM/IV Ativan**    3. CL psych will f/u.
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
## 2018-08-26 LAB
ALBUMIN SERPL ELPH-MCNC: 3.5 G/DL — SIGNIFICANT CHANGE UP (ref 3.3–5)
ALP SERPL-CCNC: 51 U/L — SIGNIFICANT CHANGE UP (ref 40–120)
ALT FLD-CCNC: 23 U/L — SIGNIFICANT CHANGE UP (ref 10–45)
ANION GAP SERPL CALC-SCNC: 13 MMOL/L — SIGNIFICANT CHANGE UP (ref 5–17)
APTT BLD: 25.4 SEC — LOW (ref 27.5–37.4)
AST SERPL-CCNC: 19 U/L — SIGNIFICANT CHANGE UP (ref 10–40)
BILIRUB SERPL-MCNC: 0.3 MG/DL — SIGNIFICANT CHANGE UP (ref 0.2–1.2)
BUN SERPL-MCNC: 6 MG/DL — LOW (ref 7–23)
CALCIUM SERPL-MCNC: 9.3 MG/DL — SIGNIFICANT CHANGE UP (ref 8.4–10.5)
CHLORIDE SERPL-SCNC: 99 MMOL/L — SIGNIFICANT CHANGE UP (ref 96–108)
CK SERPL-CCNC: 44 U/L — SIGNIFICANT CHANGE UP (ref 30–200)
CO2 SERPL-SCNC: 29 MMOL/L — SIGNIFICANT CHANGE UP (ref 22–31)
CREAT SERPL-MCNC: 0.79 MG/DL — SIGNIFICANT CHANGE UP (ref 0.5–1.3)
CULTURE RESULTS: SIGNIFICANT CHANGE UP
GLUCOSE BLDC GLUCOMTR-MCNC: 110 MG/DL — HIGH (ref 70–99)
GLUCOSE BLDC GLUCOMTR-MCNC: 115 MG/DL — HIGH (ref 70–99)
GLUCOSE BLDC GLUCOMTR-MCNC: 54 MG/DL — LOW (ref 70–99)
GLUCOSE BLDC GLUCOMTR-MCNC: 63 MG/DL — LOW (ref 70–99)
GLUCOSE BLDC GLUCOMTR-MCNC: 69 MG/DL — LOW (ref 70–99)
GLUCOSE BLDC GLUCOMTR-MCNC: 81 MG/DL — SIGNIFICANT CHANGE UP (ref 70–99)
GLUCOSE BLDC GLUCOMTR-MCNC: 86 MG/DL — SIGNIFICANT CHANGE UP (ref 70–99)
GLUCOSE SERPL-MCNC: 108 MG/DL — HIGH (ref 70–99)
HCT VFR BLD CALC: 39.9 % — SIGNIFICANT CHANGE UP (ref 39–50)
HGB BLD-MCNC: 13.2 G/DL — SIGNIFICANT CHANGE UP (ref 13–17)
INR BLD: 1.24 RATIO — HIGH (ref 0.88–1.16)
MAGNESIUM SERPL-MCNC: 2.2 MG/DL — SIGNIFICANT CHANGE UP (ref 1.6–2.6)
MCHC RBC-ENTMCNC: 31.5 PG — SIGNIFICANT CHANGE UP (ref 27–34)
MCHC RBC-ENTMCNC: 33 GM/DL — SIGNIFICANT CHANGE UP (ref 32–36)
MCV RBC AUTO: 95.4 FL — SIGNIFICANT CHANGE UP (ref 80–100)
PHOSPHATE SERPL-MCNC: 2.5 MG/DL — SIGNIFICANT CHANGE UP (ref 2.5–4.5)
PLATELET # BLD AUTO: 194 K/UL — SIGNIFICANT CHANGE UP (ref 150–400)
POTASSIUM SERPL-MCNC: 3.6 MMOL/L — SIGNIFICANT CHANGE UP (ref 3.5–5.3)
POTASSIUM SERPL-SCNC: 3.6 MMOL/L — SIGNIFICANT CHANGE UP (ref 3.5–5.3)
PROT SERPL-MCNC: 6.6 G/DL — SIGNIFICANT CHANGE UP (ref 6–8.3)
PROTHROM AB SERPL-ACNC: 13.5 SEC — HIGH (ref 9.8–12.7)
RBC # BLD: 4.19 M/UL — LOW (ref 4.2–5.8)
RBC # FLD: 12.5 % — SIGNIFICANT CHANGE UP (ref 10.3–14.5)
SODIUM SERPL-SCNC: 141 MMOL/L — SIGNIFICANT CHANGE UP (ref 135–145)
SPECIMEN SOURCE: SIGNIFICANT CHANGE UP
WBC # BLD: 6.2 K/UL — SIGNIFICANT CHANGE UP (ref 3.8–10.5)
WBC # FLD AUTO: 6.2 K/UL — SIGNIFICANT CHANGE UP (ref 3.8–10.5)

## 2018-08-26 PROCEDURE — 99232 SBSQ HOSP IP/OBS MODERATE 35: CPT

## 2018-08-26 PROCEDURE — 99233 SBSQ HOSP IP/OBS HIGH 50: CPT | Mod: GC

## 2018-08-26 PROCEDURE — 93010 ELECTROCARDIOGRAM REPORT: CPT

## 2018-08-26 RX ORDER — HALOPERIDOL DECANOATE 100 MG/ML
5 INJECTION INTRAMUSCULAR EVERY 12 HOURS
Qty: 0 | Refills: 0 | Status: DISCONTINUED | OUTPATIENT
Start: 2018-08-26 | End: 2018-08-26

## 2018-08-26 RX ORDER — FOLIC ACID 0.8 MG
1 TABLET ORAL DAILY
Qty: 0 | Refills: 0 | Status: DISCONTINUED | OUTPATIENT
Start: 2018-08-26 | End: 2018-08-31

## 2018-08-26 RX ORDER — SENNA PLUS 8.6 MG/1
2 TABLET ORAL AT BEDTIME
Qty: 0 | Refills: 0 | Status: DISCONTINUED | OUTPATIENT
Start: 2018-08-26 | End: 2018-08-31

## 2018-08-26 RX ORDER — THIAMINE MONONITRATE (VIT B1) 100 MG
100 TABLET ORAL DAILY
Qty: 0 | Refills: 0 | Status: DISCONTINUED | OUTPATIENT
Start: 2018-08-26 | End: 2018-08-31

## 2018-08-26 RX ORDER — PANTOPRAZOLE SODIUM 20 MG/1
40 TABLET, DELAYED RELEASE ORAL
Qty: 0 | Refills: 0 | Status: DISCONTINUED | OUTPATIENT
Start: 2018-08-26 | End: 2018-08-26

## 2018-08-26 RX ORDER — VALPROIC ACID (AS SODIUM SALT) 250 MG/5ML
1500 SOLUTION, ORAL ORAL
Qty: 0 | Refills: 0 | Status: DISCONTINUED | OUTPATIENT
Start: 2018-08-26 | End: 2018-08-27

## 2018-08-26 RX ORDER — BENZOCAINE AND MENTHOL 5; 1 G/100ML; G/100ML
1 LIQUID ORAL EVERY 6 HOURS
Qty: 0 | Refills: 0 | Status: DISCONTINUED | OUTPATIENT
Start: 2018-08-26 | End: 2018-08-31

## 2018-08-26 RX ORDER — POLYETHYLENE GLYCOL 3350 17 G/17G
17 POWDER, FOR SOLUTION ORAL DAILY
Qty: 0 | Refills: 0 | Status: DISCONTINUED | OUTPATIENT
Start: 2018-08-26 | End: 2018-08-28

## 2018-08-26 RX ORDER — VALPROIC ACID (AS SODIUM SALT) 250 MG/5ML
750 SOLUTION, ORAL ORAL
Qty: 0 | Refills: 0 | Status: DISCONTINUED | OUTPATIENT
Start: 2018-08-26 | End: 2018-08-27

## 2018-08-26 RX ORDER — HALOPERIDOL DECANOATE 100 MG/ML
2.5 INJECTION INTRAMUSCULAR DAILY
Qty: 0 | Refills: 0 | Status: DISCONTINUED | OUTPATIENT
Start: 2018-08-26 | End: 2018-08-28

## 2018-08-26 RX ADMIN — Medication 100 MILLIGRAM(S): at 12:11

## 2018-08-26 RX ADMIN — Medication 25 MILLIGRAM(S): at 06:12

## 2018-08-26 RX ADMIN — ENOXAPARIN SODIUM 40 MILLIGRAM(S): 100 INJECTION SUBCUTANEOUS at 12:11

## 2018-08-26 RX ADMIN — Medication 1 MILLIGRAM(S): at 12:11

## 2018-08-26 RX ADMIN — Medication 1 TABLET(S): at 06:12

## 2018-08-26 RX ADMIN — Medication 1 TABLET(S): at 12:11

## 2018-08-26 RX ADMIN — MAGNESIUM OXIDE 400 MG ORAL TABLET 200 MILLIGRAM(S): 241.3 TABLET ORAL at 17:18

## 2018-08-26 RX ADMIN — HALOPERIDOL DECANOATE 2.5 MILLIGRAM(S): 100 INJECTION INTRAMUSCULAR at 17:18

## 2018-08-26 RX ADMIN — BENZOCAINE AND MENTHOL 1 LOZENGE: 5; 1 LIQUID ORAL at 17:19

## 2018-08-26 RX ADMIN — Medication 1500 MILLIGRAM(S): at 22:27

## 2018-08-26 RX ADMIN — PANTOPRAZOLE SODIUM 40 MILLIGRAM(S): 20 TABLET, DELAYED RELEASE ORAL at 06:15

## 2018-08-26 RX ADMIN — BENZOCAINE AND MENTHOL 1 LOZENGE: 5; 1 LIQUID ORAL at 10:00

## 2018-08-26 RX ADMIN — Medication 25 MILLIGRAM(S): at 17:18

## 2018-08-26 RX ADMIN — Medication 750 MILLIGRAM(S): at 06:13

## 2018-08-26 RX ADMIN — Medication 1 TABLET(S): at 17:18

## 2018-08-26 RX ADMIN — MAGNESIUM OXIDE 400 MG ORAL TABLET 200 MILLIGRAM(S): 241.3 TABLET ORAL at 09:40
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
## 2018-08-26 NOTE — PHYSICAL THERAPY INITIAL EVALUATION ADULT - IMPAIRED TRANSFERS: SIT/STAND, REHAB EVAL
ataxic/impaired balance/decreased strength/impaired motor control/impaired coordination/impaired postural control
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
## 2018-08-26 NOTE — PHYSICAL THERAPY INITIAL EVALUATION ADULT - IMPAIRMENTS CONTRIBUTING IMPAIRED BED MOBILITY, REHAB EVAL
ataxic/impaired balance/decreased strength/impaired motor control/impaired postural control/impaired coordination
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
## 2018-08-26 NOTE — PROGRESS NOTE BEHAVIORAL HEALTH - NSBHCHARTREVIEWLAB_PSY_A_CORE FT
13.2   6.2   )-----------( 194      ( 26 Aug 2018 00:10 )             39.9     08-26    141  |  99  |  6<L>  ----------------------------<  108<H>  3.6   |  29  |  0.79    Ca    9.3      26 Aug 2018 00:10  Phos  2.5     08-26  Mg     2.2     08-26    TPro  6.6  /  Alb  3.5  /  TBili  0.3  /  DBili  x   /  AST  19  /  ALT  23  /  AlkPhos  51  08-26

## 2018-08-26 NOTE — PROGRESS NOTE ADULT - SUBJECTIVE AND OBJECTIVE BOX
INTERVAL HPI/OVERNIGHT EVENTS: remains stable from GI perpsective    MEDICATIONS  (STANDING):  amoxicillin  500 milliGRAM(s)/clavulanate 1 Tablet(s) Oral every 12 hours  enoxaparin Injectable 40 milliGRAM(s) SubCutaneous daily  folic acid 1 milliGRAM(s) Oral daily  haloperidol     Tablet 2.5 milliGRAM(s) Oral daily  magnesium oxide 200 milliGRAM(s) Oral two times a day with meals  metoprolol tartrate 25 milliGRAM(s) Oral every 12 hours  multivitamin 1 Tablet(s) Oral daily  polyethylene glycol 3350 17 Gram(s) Oral daily  senna 2 Tablet(s) Oral at bedtime  thiamine 100 milliGRAM(s) Oral daily  valproic  acid Syrup 750 milliGRAM(s) Oral <User Schedule>  valproic  acid Syrup 1500 milliGRAM(s) Oral <User Schedule>    MEDICATIONS  (PRN):  ALBUTerol/ipratropium for Nebulization 3 milliLiter(s) Nebulizer every 6 hours PRN pna  benzocaine 15 mG/menthol 3.6 mG Lozenge 1 Lozenge Oral every 6 hours PRN Sore Throat  guaiFENesin    Syrup 200 milliGRAM(s) Oral every 6 hours PRN Cough  OLANZapine Injectable 5 milliGRAM(s) IntraMuscular every 6 hours PRN Agitaition  ondansetron Injectable 4 milliGRAM(s) IV Push every 8 hours PRN Nausea      Allergies    No Known Allergies    Intolerances            PHYSICAL EXAM:   Vital Signs:  Vital Signs Last 24 Hrs  T(C): 36.9 (26 Aug 2018 08:00), Max: 37.2 (25 Aug 2018 15:00)  T(F): 98.4 (26 Aug 2018 08:00), Max: 98.9 (25 Aug 2018 15:00)  HR: 100 (26 Aug 2018 12:06) (84 - 104)  BP: 122/70 (26 Aug 2018 12:00) (112/76 - 146/89)  BP(mean): 88 (26 Aug 2018 12:00) (82 - 111)  RR: 23 (26 Aug 2018 12:06) (13 - 31)  SpO2: 95% (26 Aug 2018 12:06) (91% - 100%)  Daily     Daily Weight in k.9 (26 Aug 2018 04:00)    GENERAL:  no distress  HEENT:  NC/AT,  anicteric  CHEST:   no increased effort, breath sounds clear  HEART:  Regular rhythm  ABDOMEN:  Soft, non-tender, non-distended, normoactive bowel sounds,  no masses ,no hepato-splenomegaly, no signs of chronic liver disease  EXTEREMITIES:  no cyanosis      LABS:                        13.2   6.2   )-----------( 194      ( 26 Aug 2018 00:10 )             39.9     08-    141  |  99  |  6<L>  ----------------------------<  108<H>  3.6   |  29  |  0.79    Ca    9.3      26 Aug 2018 00:10  Phos  2.5       Mg     2.2         TPro  6.6  /  Alb  3.5  /  TBili  0.3  /  DBili  x   /  AST  19  /  ALT  23  /  AlkPhos  51      PT/INR - ( 26 Aug 2018 00:10 )   PT: 13.5 sec;   INR: 1.24 ratio         PTT - ( 26 Aug 2018 00:10 )  PTT:25.4 sec      RADIOLOGY & ADDITIONAL TESTS:

## 2018-08-26 NOTE — PROGRESS NOTE BEHAVIORAL HEALTH - NSBHCHARTREVIEWVS_PSY_A_CORE FT
T(C): 36.9 (08-26-18 @ 08:00), Max: 37.2 (08-25-18 @ 15:00)  HR: 85 (08-26-18 @ 09:00) (84 - 104)  BP: 126/65 (08-26-18 @ 09:00) (115/79 - 146/89)  RR: 24 (08-26-18 @ 09:00) (13 - 29)  SpO2: 95% (08-26-18 @ 09:00) (91% - 100%)  Wt(kg): --

## 2018-08-26 NOTE — PROGRESS NOTE BEHAVIORAL HEALTH - NSBHCONSULTMEDS_PSY_A_CORE FT
1. Agree with decreasing Haldol to 2.5mg PO daily.  Monitor EKG for QTc<500.      2. Change VPA to 750mg PO daily and 1500mg PO qhS, monitor LFTs, amylase/lipase.  Can decrease dose over next few days if pt no longer agitated/delirious.    3. PRN: Haldol 5mg PO/IV/IM q6h PRN agitation    4. May f/u as outpatient at [facility]- [phone]    5. CL psych will f/u.
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
## 2018-08-26 NOTE — PROGRESS NOTE BEHAVIORAL HEALTH - NSBHFUPINTERVALHXFT_PSY_A_CORE
Pt AOx3, calm, cooperative, less lethargic and more verbal.  Denies SI/HI, AVH, or paranoia.  No PRNs required ON.  States he is having pain in his legs and trouble urinating.  Mood is "fine."  States he slept poorly last night.

## 2018-08-26 NOTE — PROGRESS NOTE ADULT - ATTENDING COMMENTS
1. Delirium improved. Pt very lethargic still. Also c/o unable to urinate. Will  decrease Haldol to 2.5 mg daily. May need to decrease Depakote.  2ETOH abuse. So longer withdrawing from ETOH.  3. Aspiration pneumonia. Continue Zosyn.

## 2018-08-26 NOTE — PROGRESS NOTE ADULT - ASSESSMENT
38yoM admitted to the hospital on 8/17 for non necrotic pancreatitis whose hospital stay has been c/b ETOH withdrawal severe violent agitation and transferred to the MICU 8/19 for further management. 38yoM admitted to the hospital on 8/17 for non necrotic pancreatitis whose hospital stay has been c/b ETOH withdrawal with severe violent agitation and transferred to the MICU 8/19 for further management. The patients pancreatitis is resolved along with his agitation, and symptoms of ETOH withdrawal. The pt remains cooperative, calm, and communicative on Depakote, titrating down doses of Haldol. The patient has not required PRN doses of Haldol nor Zyprexa.     Neuro: No neurological deficits, remains without signs of physical withdrawal  -titrate down on Haldol in the setting of daytime drowsiness- decrease Haldol to 2.5 po daily {freom 5mg bid)  -c/w Depakote- administer morning dose of 750mg and administer 1500mg at bedtime- to decrease the amount of daytime drowsiness and help enhance sleep  -f/u psyche- daily and outpatient      CV: Hx HTN, remains hemodynamically   -c/w lopressor twice daily    Pulm: Respiratory status stable, c/w oxygenating well on R/A  -chest PT, DB&C, Incentive Spirometery  -Duoneb PRN for SOB / wheezing  -Robitussin for cough-   -cepacol lozenges- for c/o sore throat    ID: Possible Aspiration . All cultures - BC / Ucx negative- suspect URI  -c/w Augmentin for a URI / aspiraton    GI: Pancreatitis resolved / LFT's WNL  -c/w trending LFT's in the setting of treatment with Depakote and Hx of ETOH abuse  -d/c protonix- pt tolerating a PO does  -zofran PRN for N/V    Renal: Renal function WNL, difficulty with urinary retention may be attributed   -s/p straight cath prn- continue  -f/u if decreasing Haldol will help in an effort to diminish an anticholinergic effects  -trend U/O and renal function {{serum} daily  -consider urology consult if urinary retention doest not improve.    OOB to chair / walking  /  physical therapy    Vascular: C/o BUE tenderness / pain to light tactile touch / +edema / swelling  -BUE doppler r/o DVT- concern in the setting of the pt HX of a LUE compartment syndrome in the past s/p fasciotomy   -elevate BUE  -c/w Lovenox for DVT prophylaxis

## 2018-08-27 DIAGNOSIS — R33.9 RETENTION OF URINE, UNSPECIFIED: ICD-10-CM

## 2018-08-27 LAB
ALBUMIN SERPL ELPH-MCNC: 3.6 G/DL — SIGNIFICANT CHANGE UP (ref 3.3–5)
ALP SERPL-CCNC: 52 U/L — SIGNIFICANT CHANGE UP (ref 40–120)
ALT FLD-CCNC: 24 U/L — SIGNIFICANT CHANGE UP (ref 10–45)
ANION GAP SERPL CALC-SCNC: 12 MMOL/L — SIGNIFICANT CHANGE UP (ref 5–17)
APTT BLD: 34 SEC — SIGNIFICANT CHANGE UP (ref 27.5–37.4)
AST SERPL-CCNC: 25 U/L — SIGNIFICANT CHANGE UP (ref 10–40)
BILIRUB SERPL-MCNC: 0.3 MG/DL — SIGNIFICANT CHANGE UP (ref 0.2–1.2)
BUN SERPL-MCNC: 6 MG/DL — LOW (ref 7–23)
CALCIUM SERPL-MCNC: 9.5 MG/DL — SIGNIFICANT CHANGE UP (ref 8.4–10.5)
CHLORIDE SERPL-SCNC: 97 MMOL/L — SIGNIFICANT CHANGE UP (ref 96–108)
CK SERPL-CCNC: 96 U/L — SIGNIFICANT CHANGE UP (ref 30–200)
CO2 SERPL-SCNC: 28 MMOL/L — SIGNIFICANT CHANGE UP (ref 22–31)
CREAT SERPL-MCNC: 0.74 MG/DL — SIGNIFICANT CHANGE UP (ref 0.5–1.3)
GLUCOSE BLDC GLUCOMTR-MCNC: 111 MG/DL — HIGH (ref 70–99)
GLUCOSE BLDC GLUCOMTR-MCNC: 128 MG/DL — HIGH (ref 70–99)
GLUCOSE BLDC GLUCOMTR-MCNC: 70 MG/DL — SIGNIFICANT CHANGE UP (ref 70–99)
GLUCOSE BLDC GLUCOMTR-MCNC: 91 MG/DL — SIGNIFICANT CHANGE UP (ref 70–99)
GLUCOSE BLDC GLUCOMTR-MCNC: 97 MG/DL — SIGNIFICANT CHANGE UP (ref 70–99)
GLUCOSE SERPL-MCNC: 114 MG/DL — HIGH (ref 70–99)
HCT VFR BLD CALC: 38.6 % — LOW (ref 39–50)
HGB BLD-MCNC: 13.2 G/DL — SIGNIFICANT CHANGE UP (ref 13–17)
INR BLD: 1.26 RATIO — HIGH (ref 0.88–1.16)
MAGNESIUM SERPL-MCNC: 2.1 MG/DL — SIGNIFICANT CHANGE UP (ref 1.6–2.6)
MCHC RBC-ENTMCNC: 32.6 PG — SIGNIFICANT CHANGE UP (ref 27–34)
MCHC RBC-ENTMCNC: 34.3 GM/DL — SIGNIFICANT CHANGE UP (ref 32–36)
MCV RBC AUTO: 95 FL — SIGNIFICANT CHANGE UP (ref 80–100)
PHOSPHATE SERPL-MCNC: 2 MG/DL — LOW (ref 2.5–4.5)
PLATELET # BLD AUTO: 210 K/UL — SIGNIFICANT CHANGE UP (ref 150–400)
POTASSIUM SERPL-MCNC: 3.6 MMOL/L — SIGNIFICANT CHANGE UP (ref 3.5–5.3)
POTASSIUM SERPL-SCNC: 3.6 MMOL/L — SIGNIFICANT CHANGE UP (ref 3.5–5.3)
PROT SERPL-MCNC: 6.6 G/DL — SIGNIFICANT CHANGE UP (ref 6–8.3)
PROTHROM AB SERPL-ACNC: 13.8 SEC — HIGH (ref 9.8–12.7)
RBC # BLD: 4.06 M/UL — LOW (ref 4.2–5.8)
RBC # FLD: 12 % — SIGNIFICANT CHANGE UP (ref 10.3–14.5)
SODIUM SERPL-SCNC: 137 MMOL/L — SIGNIFICANT CHANGE UP (ref 135–145)
WBC # BLD: 4.8 K/UL — SIGNIFICANT CHANGE UP (ref 3.8–10.5)
WBC # FLD AUTO: 4.8 K/UL — SIGNIFICANT CHANGE UP (ref 3.8–10.5)

## 2018-08-27 PROCEDURE — 99232 SBSQ HOSP IP/OBS MODERATE 35: CPT

## 2018-08-27 PROCEDURE — 99233 SBSQ HOSP IP/OBS HIGH 50: CPT

## 2018-08-27 RX ORDER — VALPROIC ACID (AS SODIUM SALT) 250 MG/5ML
500 SOLUTION, ORAL ORAL DAILY
Qty: 0 | Refills: 0 | Status: DISCONTINUED | OUTPATIENT
Start: 2018-08-27 | End: 2018-08-27

## 2018-08-27 RX ORDER — VALPROIC ACID (AS SODIUM SALT) 250 MG/5ML
500 SOLUTION, ORAL ORAL DAILY
Qty: 0 | Refills: 0 | Status: DISCONTINUED | OUTPATIENT
Start: 2018-08-27 | End: 2018-08-28

## 2018-08-27 RX ORDER — VALPROIC ACID (AS SODIUM SALT) 250 MG/5ML
750 SOLUTION, ORAL ORAL
Qty: 0 | Refills: 0 | Status: DISCONTINUED | OUTPATIENT
Start: 2018-08-27 | End: 2018-08-28

## 2018-08-27 RX ORDER — VALPROIC ACID (AS SODIUM SALT) 250 MG/5ML
750 SOLUTION, ORAL ORAL
Qty: 0 | Refills: 0 | Status: DISCONTINUED | OUTPATIENT
Start: 2018-08-27 | End: 2018-08-27

## 2018-08-27 RX ORDER — SODIUM,POTASSIUM PHOSPHATES 278-250MG
1 POWDER IN PACKET (EA) ORAL THREE TIMES A DAY
Qty: 0 | Refills: 0 | Status: COMPLETED | OUTPATIENT
Start: 2018-08-27 | End: 2018-08-27

## 2018-08-27 RX ORDER — TAMSULOSIN HYDROCHLORIDE 0.4 MG/1
0.4 CAPSULE ORAL AT BEDTIME
Qty: 0 | Refills: 0 | Status: DISCONTINUED | OUTPATIENT
Start: 2018-08-27 | End: 2018-08-27

## 2018-08-27 RX ORDER — SODIUM,POTASSIUM PHOSPHATES 278-250MG
1 POWDER IN PACKET (EA) ORAL
Qty: 0 | Refills: 0 | Status: DISCONTINUED | OUTPATIENT
Start: 2018-08-27 | End: 2018-08-27

## 2018-08-27 RX ORDER — TAMSULOSIN HYDROCHLORIDE 0.4 MG/1
0.4 CAPSULE ORAL DAILY
Qty: 0 | Refills: 0 | Status: DISCONTINUED | OUTPATIENT
Start: 2018-08-27 | End: 2018-08-31

## 2018-08-27 RX ADMIN — Medication 1 TABLET(S): at 03:08

## 2018-08-27 RX ADMIN — HALOPERIDOL DECANOATE 2.5 MILLIGRAM(S): 100 INJECTION INTRAMUSCULAR at 17:16

## 2018-08-27 RX ADMIN — Medication 1 MILLIGRAM(S): at 12:27

## 2018-08-27 RX ADMIN — Medication 25 MILLIGRAM(S): at 05:06

## 2018-08-27 RX ADMIN — Medication 200 MILLIGRAM(S): at 21:19

## 2018-08-27 RX ADMIN — SENNA PLUS 2 TABLET(S): 8.6 TABLET ORAL at 21:20

## 2018-08-27 RX ADMIN — TAMSULOSIN HYDROCHLORIDE 0.4 MILLIGRAM(S): 0.4 CAPSULE ORAL at 10:44

## 2018-08-27 RX ADMIN — Medication 1 TABLET(S): at 05:06

## 2018-08-27 RX ADMIN — MAGNESIUM OXIDE 400 MG ORAL TABLET 200 MILLIGRAM(S): 241.3 TABLET ORAL at 08:30

## 2018-08-27 RX ADMIN — MAGNESIUM OXIDE 400 MG ORAL TABLET 200 MILLIGRAM(S): 241.3 TABLET ORAL at 17:17

## 2018-08-27 RX ADMIN — Medication 100 MILLIGRAM(S): at 12:27

## 2018-08-27 RX ADMIN — Medication 750 MILLIGRAM(S): at 21:29

## 2018-08-27 RX ADMIN — Medication 25 MILLIGRAM(S): at 17:21

## 2018-08-27 RX ADMIN — Medication 1 TABLET(S): at 17:16

## 2018-08-27 RX ADMIN — ENOXAPARIN SODIUM 40 MILLIGRAM(S): 100 INJECTION SUBCUTANEOUS at 12:15

## 2018-08-27 RX ADMIN — Medication 1 TABLET(S): at 12:27

## 2018-08-27 RX ADMIN — Medication 500 MILLIGRAM(S): at 12:27

## 2018-08-27 RX ADMIN — Medication 1 TABLET(S): at 10:44

## 2018-08-27 RX ADMIN — Medication 1 TABLET(S): at 19:09

## 2018-08-27 NOTE — PROGRESS NOTE BEHAVIORAL HEALTH - NSBHCHARTREVIEWVS_PSY_A_CORE FT
T(C): 36.9 (08-26-18 @ 08:00), Max: 37.2 (08-25-18 @ 15:00)  HR: 85 (08-26-18 @ 09:00) (84 - 104)  BP: 126/65 (08-26-18 @ 09:00) (115/79 - 146/89)  RR: 24 (08-26-18 @ 09:00) (13 - 29)  SpO2: 95% (08-26-18 @ 09:00) (91% - 100%)  Wt(kg): -- Vital Signs Last 24 Hrs  T(C): 36.5 (27 Aug 2018 07:00), Max: 37.4 (26 Aug 2018 17:00)  T(F): 97.7 (27 Aug 2018 07:00), Max: 99.4 (26 Aug 2018 17:00)  HR: 101 (27 Aug 2018 10:00) (88 - 105)  BP: 137/86 (27 Aug 2018 10:00) (106/77 - 171/98)  BP(mean): 107 (27 Aug 2018 10:00) (79 - 126)  RR: 24 (27 Aug 2018 10:00) (12 - 31)  SpO2: 97% (27 Aug 2018 10:00) (93% - 100%)

## 2018-08-27 NOTE — PROGRESS NOTE ADULT - PROBLEM SELECTOR PLAN 1
abstain from alcohol  use  psych follow up for further care  continue valproic acid will eventually need to check a Depakote level

## 2018-08-27 NOTE — PROGRESS NOTE BEHAVIORAL HEALTH - NSBHCHARTREVIEWLAB_PSY_A_CORE FT
13.2   6.2   )-----------( 194      ( 26 Aug 2018 00:10 )             39.9     08-26    141  |  99  |  6<L>  ----------------------------<  108<H>  3.6   |  29  |  0.79    Ca    9.3      26 Aug 2018 00:10  Phos  2.5     08-26  Mg     2.2     08-26    TPro  6.6  /  Alb  3.5  /  TBili  0.3  /  DBili  x   /  AST  19  /  ALT  23  /  AlkPhos  51  08-26 13.2   4.8   )-----------( 210      ( 27 Aug 2018 00:26 )             38.6     08-27    137  |  97  |  6<L>  ----------------------------<  114<H>  3.6   |  28  |  0.74    Ca    9.5      27 Aug 2018 00:26  Phos  2.0     08-27  Mg     2.1     08-27    TPro  6.6  /  Alb  3.6  /  TBili  0.3  /  DBili  x   /  AST  25  /  ALT  24  /  AlkPhos  52  08-27

## 2018-08-27 NOTE — PROGRESS NOTE ADULT - GASTROINTESTINAL DETAILS
bowel sounds normal/no distention/soft/nontender/no rebound tenderness/no guarding/no rigidity
normal/nontender/no distention/bowel sounds normal/soft
nontender/no distention/no masses palpable/normal/soft
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
## 2018-08-27 NOTE — PROGRESS NOTE ADULT - ATTENDING COMMENTS
I am a non participating [facility] physician seeing Pt in coverage for Dr Augustine. The above patient examination was reviewed with Dr. Menard and I agree with his evaluation, assessment and treatment plan.  Sam Augustine M.D.
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
## 2018-08-27 NOTE — PROGRESS NOTE BEHAVIORAL HEALTH - NSBHFUPINTERVALHXFT_PSY_A_CORE
Pt AOx3, calm, cooperative, less lethargic and more verbal.  Denies SI/HI, AVH, or paranoia.  No PRNs required ON.  States he is having pain in his legs and trouble urinating.  Mood is "fine."  States he slept poorly last night. Pt AOx3, calm, cooperative,  Denies SI/HI, AVH, or paranoia.  No PRNs required ON. no withdrawal symptoms.

## 2018-08-27 NOTE — PROGRESS NOTE ADULT - NEGATIVE ENMT SYMPTOMS
no sinus symptoms/no tinnitus/no nose bleeds/no recurrent cold sores/no dysphagia/no hearing difficulty/no nasal congestion
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
## 2018-08-27 NOTE — PROGRESS NOTE ADULT - NEUROLOGICAL DETAILS
responds to pain/responds to verbal commands/deep reflexes intact/cranial nerves intact/sensation intact/superficial reflexes intact/alert and oriented x 3
alert and oriented x 3/responds to pain/deep reflexes intact/responds to verbal commands/sensation intact

## 2018-08-27 NOTE — PROGRESS NOTE ADULT - RS GEN PE MLT RESP DETAILS PC
airway patent/breath sounds equal/good air movement/respirations non-labored/clear to auscultation bilaterally
breath sounds equal/clear to auscultation bilaterally/no rales/airway patent/good air movement/no chest wall tenderness/no intercostal retractions/respirations non-labored/no wheezes

## 2018-08-27 NOTE — PROGRESS NOTE BEHAVIORAL HEALTH - NSBHCONSULTMEDS_PSY_A_CORE FT
1. Agree with decreasing Haldol to 2.5mg PO daily.  Monitor EKG for QTc<500.      2. Change VPA to 750mg PO daily and 1500mg PO qhS, monitor LFTs, amylase/lipase.  Can decrease dose over next few days if pt no longer agitated/delirious.    3. PRN: Haldol 5mg PO/IV/IM q6h PRN agitation    4. May f/u as outpatient at [facility]- [phone]    5. CL psych will f/u. 1. d/c haldol.  Monitor EKG for QTc<500.      2. Change VPA to 500mg po bid . monitor LFTs, amylase/lipase.  Can decrease dose over next few days if pt no longer agitated/delirious.    3. PRN: Haldol 5mg PO/IV/IM q6h PRN agitation    4. May f/u as outpatient at [facility]- [phone]    5. CL psych will f/u.

## 2018-08-28 ENCOUNTER — TRANSCRIPTION ENCOUNTER (OUTPATIENT)
Age: 38
End: 2018-08-28

## 2018-08-28 DIAGNOSIS — K85.21 ALCOHOL INDUCED ACUTE PANCREATITIS WITH UNINFECTED NECROSIS: ICD-10-CM

## 2018-08-28 LAB
GLUCOSE BLDC GLUCOMTR-MCNC: 125 MG/DL — HIGH (ref 70–99)
GLUCOSE BLDC GLUCOMTR-MCNC: 99 MG/DL — SIGNIFICANT CHANGE UP (ref 70–99)

## 2018-08-28 PROCEDURE — 93970 EXTREMITY STUDY: CPT | Mod: 26

## 2018-08-28 PROCEDURE — 99232 SBSQ HOSP IP/OBS MODERATE 35: CPT

## 2018-08-28 RX ORDER — ENOXAPARIN SODIUM 100 MG/ML
70 INJECTION SUBCUTANEOUS
Qty: 0 | Refills: 0 | Status: DISCONTINUED | OUTPATIENT
Start: 2018-08-28 | End: 2018-08-29

## 2018-08-28 RX ORDER — POLYETHYLENE GLYCOL 3350 17 G/17G
17 POWDER, FOR SOLUTION ORAL DAILY
Qty: 0 | Refills: 0 | Status: DISCONTINUED | OUTPATIENT
Start: 2018-08-28 | End: 2018-08-31

## 2018-08-28 RX ORDER — VALPROIC ACID (AS SODIUM SALT) 250 MG/5ML
500 SOLUTION, ORAL ORAL
Qty: 0 | Refills: 0 | Status: DISCONTINUED | OUTPATIENT
Start: 2018-08-28 | End: 2018-08-29

## 2018-08-28 RX ADMIN — Medication 200 MILLIGRAM(S): at 22:26

## 2018-08-28 RX ADMIN — SENNA PLUS 2 TABLET(S): 8.6 TABLET ORAL at 22:26

## 2018-08-28 RX ADMIN — MAGNESIUM OXIDE 400 MG ORAL TABLET 200 MILLIGRAM(S): 241.3 TABLET ORAL at 09:22

## 2018-08-28 RX ADMIN — TAMSULOSIN HYDROCHLORIDE 0.4 MILLIGRAM(S): 0.4 CAPSULE ORAL at 13:40

## 2018-08-28 RX ADMIN — ENOXAPARIN SODIUM 40 MILLIGRAM(S): 100 INJECTION SUBCUTANEOUS at 13:41

## 2018-08-28 RX ADMIN — Medication 25 MILLIGRAM(S): at 17:06

## 2018-08-28 RX ADMIN — Medication 1 MILLIGRAM(S): at 11:46

## 2018-08-28 RX ADMIN — MAGNESIUM OXIDE 400 MG ORAL TABLET 200 MILLIGRAM(S): 241.3 TABLET ORAL at 17:06

## 2018-08-28 RX ADMIN — Medication 1 TABLET(S): at 11:45

## 2018-08-28 RX ADMIN — Medication 500 MILLIGRAM(S): at 17:07

## 2018-08-28 RX ADMIN — Medication 25 MILLIGRAM(S): at 05:52

## 2018-08-28 RX ADMIN — ENOXAPARIN SODIUM 70 MILLIGRAM(S): 100 INJECTION SUBCUTANEOUS at 22:26

## 2018-08-28 RX ADMIN — Medication 100 MILLIGRAM(S): at 13:40

## 2018-08-28 NOTE — DISCHARGE NOTE ADULT - PROVIDER TOKENS
TOKEN:'[id number]:'[id number]' TOKEN:'[id number]:'[id number]:'[id number]'

## 2018-08-28 NOTE — PROGRESS NOTE BEHAVIORAL HEALTH - NSBHFUPINTERVALHXFT_PSY_A_CORE
Pt AOx3, calm, cooperative, no complaints. pt denies psychosis, mayito, mood symptoms. pt slept well, has fair appetite. No PRNs required ON. no withdrawal symptoms.

## 2018-08-28 NOTE — PROGRESS NOTE BEHAVIORAL HEALTH - NSBHFUPREASONCONS_PSY_A_CORE
agitation/alcohol/other substance/delerium
alcohol
alcohol/agitation/other substance
car
agitation/alcohol
car

## 2018-08-28 NOTE — PROGRESS NOTE BEHAVIORAL HEALTH - PERCEPTIONS
Other
No abnormalities
No abnormalities/Other
No abnormalities
Other/No abnormalities

## 2018-08-28 NOTE — PROGRESS NOTE BEHAVIORAL HEALTH - NSBHCHARTREVIEWINVESTIGATE_PSY_A_CORE FT
< from: 12 Lead ECG (08.23.18 @ 06:21) >      Ventricular Rate 85 BPM    Atrial Rate 85 BPM    P-R Interval 130 ms    QRS Duration 94 ms    Q-T Interval 370 ms    QTC Calculation(Bezet) 440 ms    P Axis 60 degrees    R Axis 55 degrees    T Axis 24 degrees    Diagnosis Line NORMAL SINUS RHYTHM  NORMAL ECG    Confirmed by MD RAYMUNDO, LAKIA ([id number]) on 8/23/2018 7:56:47 PM    < end of copied text >
< from: 12 Lead ECG (08.23.18 @ 06:21) >      Ventricular Rate 85 BPM    Atrial Rate 85 BPM    P-R Interval 130 ms    QRS Duration 94 ms    Q-T Interval 370 ms    QTC Calculation(Bezet) 440 ms    P Axis 60 degrees    R Axis 55 degrees    T Axis 24 degrees    Diagnosis Line NORMAL SINUS RHYTHM  NORMAL ECG    Confirmed by MD RAYMUNDO, LAKIA ([id number]) on 8/23/2018 7:56:47 PM    < end of copied text >
< from: 12 Lead ECG (08.17.18 @ 16:54) >      Ventricular Rate 97 BPM    Atrial Rate 97 BPM    P-R Interval 134 ms    QRS Duration 92 ms    Q-T Interval 344 ms    QTC Calculation(Bezet) 436 ms    P Axis 73 degrees    R Axis 50 degrees    T Axis 45 degrees    Diagnosis Line NORMAL SINUS RHYTHM  NORMAL ECG    Confirmed by ATTENDING, ED ([id number]),  ADELINA WILDER ([id number]) on 8/18/2018 7:02:36 AM    < end of copied text >
< from: 12 Lead ECG (08.23.18 @ 06:21) >      Ventricular Rate 85 BPM    Atrial Rate 85 BPM    P-R Interval 130 ms    QRS Duration 94 ms    Q-T Interval 370 ms    QTC Calculation(Bezet) 440 ms    P Axis 60 degrees    R Axis 55 degrees    T Axis 24 degrees    Diagnosis Line NORMAL SINUS RHYTHM  NORMAL ECG    Confirmed by MD RAYMUNDO, LAKIA ([id number]) on 8/23/2018 7:56:47 PM    < end of copied text >
< from: 12 Lead ECG (08.22.18 @ 00:43) >      Ventricular Rate 81 BPM    Atrial Rate 81 BPM    P-R Interval 140 ms    QRS Duration 96 ms    Q-T Interval 386 ms    QTC Calculation(Bezet) 448 ms    P Axis 51 degrees    R Axis 38 degrees    T Axis 18 degrees    Diagnosis Line NORMAL SINUS RHYTHM  NORMAL ECG    Confirmed by JENNIFER MILLER NADA ([id number]) on 8/22/2018 10:19:25 AM    < end of copied text >
< from: 12 Lead ECG (08.22.18 @ 00:43) >      Ventricular Rate 81 BPM    Atrial Rate 81 BPM    P-R Interval 140 ms    QRS Duration 96 ms    Q-T Interval 386 ms    QTC Calculation(Bezet) 448 ms    P Axis 51 degrees    R Axis 38 degrees    T Axis 18 degrees    Diagnosis Line NORMAL SINUS RHYTHM  NORMAL ECG    Confirmed by JENNIFER MILLER NADA ([id number]) on 8/22/2018 10:19:25 AM    < end of copied text >
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
## 2018-08-28 NOTE — DISCHARGE NOTE ADULT - MEDICATION SUMMARY - MEDICATIONS TO CHANGE
I will SWITCH the dose or number of times a day I take the medications listed below when I get home from the hospital:    metoprolol tartrate 50 mg oral tablet  -- 1 tab(s) by mouth 3 times a day

## 2018-08-28 NOTE — PROGRESS NOTE ADULT - ASSESSMENT
39 yo male with a long hx of ETOH abuse and multiple episodes of alcoholic pancreatis, admitted for recurrent pancreatitis and then developed DTs complicated by aspiration PNA and respiratory distress. seen no resting comfortably. tolerating PO 39 yo male with a long hx of ETOH abuse and multiple episodes of alcoholic pancreatis, admitted for recurrent pancreatitis and then developed DTs complicated by aspiration PNA and respiratory distress. seen no resting comfortably. Tolerating oral diet. For repeat CXR to assess the aspiration pneumonia.

## 2018-08-28 NOTE — DISCHARGE NOTE ADULT - CARE PROVIDER_API CALL
Andi Krishnan), Urology Surgery  [address] [place] [address]  Phone: [phone]  Fax: [phone]    Elkin Yu), Gastroenterology  [address] [place] [address] [address]  [place] [address]  Phone: [phone]  Fax: [phone] Andi Krishnan), Urology Surgery  [address] [place] [address]  Phone: [phone]  Fax: [phone]    Elkin Yu), Gastroenterology  [address] [place] [address] [address]  [place] [address]  Phone: [phone]  Fax: [phone]    Moe Menard (DO), [place] [address]  Phone: [phone]  Fax: [phone]

## 2018-08-28 NOTE — PROGRESS NOTE BEHAVIORAL HEALTH - BODY HABITUS
Well nourished

## 2018-08-28 NOTE — DISCHARGE NOTE ADULT - HOSPITAL COURSE
39 yo male with a long hx of ETOH abuse and multiple episodes of alcoholic pancreatis, admitted for recurrent pancreatitis and then developed DTs complicated by aspiration PNA and respiratory distress  . acute urinary retention without suggestion of chronic obstruction. Possible neurogenic bladder    -Continue alpha blockers.  patient on side effects of retrograde ejaculation.  -Stool softeners   -Minimize narcotics, anti-cholinergics, anti-histamines  -Will need urology follow-up for evaluation of urinary retention/incontinence  - 37 yo male with a long hx of ETOH abuse and multiple episodes of alcoholic pancreatis, admitted for recurrent pancreatitis and then developed DTs complicated by aspiration PNA and respiratory distress  . acute urinary retention without suggestion of chronic obstruction. Possible neurogenic bladder  Continue alpha blockers.  patient on side effects of retrograde ejaculation.  -Stool softeners   -Minimize narcotics, anti-cholinergics, anti-histamines  - urology follow-up for evaluation of urinary retention/incontinence;  seen by psychiatrist and tapering depakote dose;    -Thrombosis in   the right basilic and cephalic veins.    Superficial thrombosis of the left basilic and cephalic veins.  	started on eliquis  follow up with primary doctor as outpatient 39 yo male with a long hx of ETOH abuse and multiple episodes of alcoholic pancreatis, admitted for recurrent pancreatitis and then developed DTs complicated by aspiration PNA and respiratory distress  . acute urinary retention without suggestion of chronic obstruction. Possible neurogenic bladder  Continue alpha blockers.  patient on side effects of retrograde ejaculation.  -Stool softeners   -Minimize narcotics, anti-cholinergics, anti-histamines  - urology follow-up for evaluation of urinary retention/incontinence as outpatient  seen by psychiatrist and tapering depakote dose;  follow up with primary doctor in 1 week    -Thrombosis in   the right basilic and cephalic veins.    Superficial thrombosis of the left basilic and cephalic veins.  	started on eliquis  follow up with primary doctor as outpatient

## 2018-08-28 NOTE — PROGRESS NOTE BEHAVIORAL HEALTH - NSBHCHARTREVIEWLAB_PSY_A_CORE FT
13.2   4.8   )-----------( 210      ( 27 Aug 2018 00:26 )             38.6     08-27    137  |  97  |  6<L>  ----------------------------<  114<H>  3.6   |  28  |  0.74    Ca    9.5      27 Aug 2018 00:26  Phos  2.0     08-27  Mg     2.1     08-27    TPro  6.6  /  Alb  3.6  /  TBili  0.3  /  DBili  x   /  AST  25  /  ALT  24  /  AlkPhos  52  08-27

## 2018-08-28 NOTE — PROGRESS NOTE BEHAVIORAL HEALTH - NSBHCONSULTFOLLOWAFTERCARE_PSY_A_CORE FT
May f/u as outpatient at [facility]- [phone]
May f/u as outpatient at [facility]- [phone]
May f/u as outpatient at [facility]- [phone]

## 2018-08-28 NOTE — DISCHARGE NOTE ADULT - PLAN OF CARE
resolve symptoms diet as tolerated  follow up with alcohol rehab monitor voiding and if develop any urinary retention symptoms please get medical assistance  follow up with urologist as outpatient follow up AA follow up with alcohol  rehab

## 2018-08-28 NOTE — PROGRESS NOTE BEHAVIORAL HEALTH - NSBHCONSULTOBS_PSY_A_CORE
Constant observation
Enhanced supervision
Constant observation
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
## 2018-08-28 NOTE — PROGRESS NOTE BEHAVIORAL HEALTH - NSBHPTASSESSDT_PSY_A_CORE
19-Aug-2018 15:07
20-Aug-2018 15:22
23-Aug-2018 12:49
25-Aug-2018 11:29
26-Aug-2018 11:59
27-Aug-2018 10:18
24-Aug-2018 11:08
22-Aug-2018 16:48
28-Aug-2018 11:14

## 2018-08-28 NOTE — PROGRESS NOTE BEHAVIORAL HEALTH - THOUGHT CONTENT
Other/Ruminations
Unremarkable
Other/Ruminations
Unremarkable/Ruminations
Unremarkable
Unremarkable
Other/Ruminations

## 2018-08-28 NOTE — PROGRESS NOTE BEHAVIORAL HEALTH - NS ED BHA MED ROS GENITOURINARY
No complaints
Unable to assess
Yes
Yes
Unable to assess
Unable to assess
No complaints

## 2018-08-28 NOTE — DISCHARGE NOTE ADULT - MEDICATION SUMMARY - MEDICATIONS TO TAKE
I will START or STAY ON the medications listed below when I get home from the hospital:    tamsulosin 0.4 mg oral capsule  -- 1 cap(s) by mouth once a day  -- Indication: For Urinary retention with incomplete bladder emptying    Eliquis 5 mg oral tablet  -- 2 tab(s) by mouth 2 times a day through 9/4/18  -- Check with your doctor before becoming pregnant.  It is very important that you take or use this exactly as directed.  Do not skip doses or discontinue unless directed by your doctor.  Obtain medical advice before taking any non-prescription drugs as some may affect the action of this medication.    -- Indication: For Anticoagulant    apixaban 5 mg oral tablet  -- 1 tab(s) by mouth 2 times a day starting 9/5/18  -- Check with your doctor before becoming pregnant.  It is very important that you take or use this exactly as directed.  Do not skip doses or discontinue unless directed by your doctor.  Obtain medical advice before taking any non-prescription drugs as some may affect the action of this medication.    -- Indication: For Anticoagulant    divalproex sodium 250 mg oral delayed release tablet  -- 1 tab(s) by mouth once a day  -- Indication: For mood    metoprolol tartrate 25 mg oral tablet  -- 1 tab(s) by mouth every 12 hours  -- Indication: For cardiac    cranberry oral tablet  -- 1 tab(s) by mouth once a day  -- Indication: For supplement    senna oral tablet  -- 2 tab(s) by mouth once a day (at bedtime), As Needed for constipation  -- Indication: For laxative    magnesium oxide 400 mg (241.3 mg elemental magnesium) oral tablet  -- 0.5 tab(s) by mouth 2 times a day (with meals)  -- Indication: For supplement    multivitamin  -- 1 tab(s) by mouth once a day  -- Indication: For vitamin    folic acid 1 mg oral tablet  -- 1 tab(s) by mouth once a day  -- Indication: For vitamin    thiamine 100 mg oral tablet  -- 1 tab(s) by mouth once a day  -- Indication: For vitamin

## 2018-08-28 NOTE — PROGRESS NOTE BEHAVIORAL HEALTH - NS ED BHA MED ROS SKIN
No complaints
No complaints
Unable to assess
No complaints
No complaints
Unable to assess
Unable to assess

## 2018-08-28 NOTE — PROGRESS NOTE BEHAVIORAL HEALTH - NSBHFUPSUICINTERVAL_PSY_A_CORE
none known
none known
unable to assess

## 2018-08-28 NOTE — DISCHARGE NOTE ADULT - CARE PROVIDERS DIRECT ADDRESSES
,DirectAddress_Unknown,[email or web address] ,DirectAddress_Unknown,[email or web address],DirectAddress_Unknown

## 2018-08-28 NOTE — PROGRESS NOTE ADULT - SUBJECTIVE AND OBJECTIVE BOX
Urology Progress Note    Overnight Events:  No acute events overnight. The patient has no new urologic complaints. Anthony feels well. He reports he is voiding well on his own.    Review of Systems:   Constitutional: No weight loss, no weakness  CV: No chest pain, no chest pressure  Pulm: No shortness of breath, cough, or sputum    Physical Exam:  Vital signs  T(C): 36.4 (08-28-18 @ 05:47), Max: 36.9 (08-27-18 @ 19:26)  HR: 95 (08-28-18 @ 05:47)  BP: 122/84 (08-28-18 @ 05:47)  SpO2: 94% (08-28-18 @ 05:47)  Wt(kg): --    Gen: No acute distress. Normal mood  Abd: soft, non-tender, non-distende  : Non-palpable bladder    Labs:      08-27 @ 00:26    WBC 4.8   / Hct 38.6  / SCr 0.74     08-27    137  |  97  |  6<L>  ----------------------------<  114<H>  3.6   |  28  |  0.74    Ca    9.5      27 Aug 2018 00:26  Phos  2.0     08-27  Mg     2.1     08-27    TPro  6.6  /  Alb  3.6  /  TBili  0.3  /  DBili  x   /  AST  25  /  ALT  24  /  AlkPhos  52  08-27    PT/INR - ( 27 Aug 2018 00:26 )   PT: 13.8 sec;   INR: 1.26 ratio         PTT - ( 27 Aug 2018 00:26 )  PTT:34.0 sec

## 2018-08-28 NOTE — PROGRESS NOTE BEHAVIORAL HEALTH - NSBHCHARTREVIEWVS_PSY_A_CORE FT
Vital Signs Last 24 Hrs  T(C): 36.4 (28 Aug 2018 09:00), Max: 36.9 (27 Aug 2018 19:26)  T(F): 97.6 (28 Aug 2018 09:00), Max: 98.5 (27 Aug 2018 19:26)  HR: 86 (28 Aug 2018 09:00) (84 - 106)  BP: 117/74 (28 Aug 2018 09:00) (117/74 - 135/89)  BP(mean): 97 (27 Aug 2018 13:00) (97 - 100)  RR: 18 (28 Aug 2018 09:00) (18 - 25)  SpO2: 98% (28 Aug 2018 09:00) (93% - 99%)
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
## 2018-08-28 NOTE — PROGRESS NOTE ADULT - ASSESSMENT
38yM with acute urinary retention without suggestion of chronic obstruction. Possible neurogenic bladder    -Continue alpha blockers.  patient on side effects of retrograde ejaculation.  -Stool softeners   -Minimize narcotics, anti-cholinergics, anti-histamines  -Will need urology follow-up for evaluation of urinary retention/incontinence  -Can consider PVR q shift to determine if fully emptying bladder. Please page urology if >200cc residual  -Thank you for the consult. Please call with questions.

## 2018-08-28 NOTE — PROGRESS NOTE BEHAVIORAL HEALTH - NSBHFUPVIOL_PSY_A_CORE
yes
none known
unable to assess

## 2018-08-28 NOTE — DISCHARGE NOTE ADULT - CARE PLAN
Principal Discharge DX:	Pancreatitis, alcoholic, acute  Secondary Diagnosis:	Urinary retention with incomplete bladder emptying  Secondary Diagnosis:	Alcohol withdrawal syndrome with complication Principal Discharge DX:	Pancreatitis, alcoholic, acute  Goal:	resolve symptoms  Assessment and plan of treatment:	diet as tolerated  follow up with alcohol rehab  Secondary Diagnosis:	Urinary retention with incomplete bladder emptying  Assessment and plan of treatment:	monitor voiding and if develop any urinary retention symptoms please get medical assistance  follow up with urologist as outpatient  Secondary Diagnosis:	Alcohol withdrawal syndrome with complication  Assessment and plan of treatment:	follow up AA Principal Discharge DX:	Pancreatitis, alcoholic, acute  Goal:	resolve symptoms  Assessment and plan of treatment:	diet as tolerated  follow up with alcohol rehab  Secondary Diagnosis:	Urinary retention with incomplete bladder emptying  Assessment and plan of treatment:	monitor voiding and if develop any urinary retention symptoms please get medical assistance  follow up with urologist as outpatient  Secondary Diagnosis:	Alcohol withdrawal syndrome with complication  Assessment and plan of treatment:	follow up with alcohol  rehab
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
## 2018-08-28 NOTE — CHART NOTE - NSCHARTNOTEFT_GEN_A_CORE
follow up- Per RN pt refused miralax; pt had BM today; changed miralax to 17gm daily PRN; monitor BM;  Hanna Summers(NP)  3 [facility], [phone]

## 2018-08-28 NOTE — PROGRESS NOTE ADULT - SUBJECTIVE AND OBJECTIVE BOX
INTERVAL HPI/OVERNIGHT EVENTS: Doing well, ambulating, no abd pain, tolerating PO    MEDICATIONS  (STANDING):  enoxaparin Injectable 40 milliGRAM(s) SubCutaneous daily  folic acid 1 milliGRAM(s) Oral daily  guaiFENesin    Syrup 200 milliGRAM(s) Oral at bedtime  magnesium oxide 200 milliGRAM(s) Oral two times a day with meals  metoprolol tartrate 25 milliGRAM(s) Oral every 12 hours  multivitamin 1 Tablet(s) Oral daily  polyethylene glycol 3350 17 Gram(s) Oral daily  senna 2 Tablet(s) Oral at bedtime  tamsulosin 0.4 milliGRAM(s) Oral daily  thiamine 100 milliGRAM(s) Oral daily  valproic  acid Syrup 500 milliGRAM(s) Oral two times a day    MEDICATIONS  (PRN):  ALBUTerol/ipratropium for Nebulization 3 milliLiter(s) Nebulizer every 6 hours PRN pna  benzocaine 15 mG/menthol 3.6 mG Lozenge 1 Lozenge Oral every 6 hours PRN Sore Throat      Allergies    No Known Allergies    Intolerances            PHYSICAL EXAM:   Vital Signs:  Vital Signs Last 24 Hrs  T(C): 36.4 (28 Aug 2018 09:00), Max: 36.9 (27 Aug 2018 19:26)  T(F): 97.6 (28 Aug 2018 09:00), Max: 98.5 (27 Aug 2018 19:26)  HR: 86 (28 Aug 2018 09:00) (84 - 106)  BP: 117/74 (28 Aug 2018 09:00) (117/74 - 138/86)  BP(mean): 97 (27 Aug 2018 13:00) (95 - 100)  RR: 18 (28 Aug 2018 09:00) (18 - 25)  SpO2: 98% (28 Aug 2018 09:00) (93% - 100%)  Daily     Daily Weight in k.7 (28 Aug 2018 05:47)    GENERAL:  no distress  HEENT:  NC/AT,  anicteric  CHEST:   no increased effort, breath sounds clear  HEART:  Regular rhythm  ABDOMEN:  Soft, non-tender, non-distended, normoactive bowel sounds,  no masses ,no hepato-splenomegaly, no signs of chronic liver disease  EXTEREMITIES:  no cyanosis      LABS:                        13.2   4.8   )-----------( 210      ( 27 Aug 2018 00:26 )             38.6         137  |  97  |  6<L>  ----------------------------<  114<H>  3.6   |  28  |  0.74    Ca    9.5      27 Aug 2018 00:26  Phos  2.0       Mg     2.1         TPro  6.6  /  Alb  3.6  /  TBili  0.3  /  DBili  x   /  AST  25  /  ALT  24  /  AlkPhos  52      PT/INR - ( 27 Aug 2018 00:26 )   PT: 13.8 sec;   INR: 1.26 ratio         PTT - ( 27 Aug 2018 00:26 )  PTT:34.0 sec      RADIOLOGY & ADDITIONAL TESTS:

## 2018-08-28 NOTE — PROGRESS NOTE ADULT - ATTENDING COMMENTS
I am a non participating [facility] physician seeing Pt in coverage for Dr Augustine. The above patient examination was reviewed with Dr. Menard and I agree with his evaluation, assessment and treatment plan.  Sam Augustine M.D. Recurrent alcoholism with acute alcoholic pancreatitis resulting in alcohol cssation and acute alcohol withdrawl with violent encephalopathy, now clearing. For CXR to assess aspiration and cough and repeat CTT of the Abdomen to R/O a post pancreatitis pseudocyst.

## 2018-08-28 NOTE — PROGRESS NOTE BEHAVIORAL HEALTH - NS ED BHA MED ROS MUSCULOSKELETAL
No complaints
No complaints
Unable to assess
Yes
Yes
Unable to assess
Unable to assess

## 2018-08-28 NOTE — PROGRESS NOTE BEHAVIORAL HEALTH - AXIS III
Pancreatitis

## 2018-08-28 NOTE — PROGRESS NOTE BEHAVIORAL HEALTH - NS ED BHA MED ROS RESPIRATORY
No complaints
Unable to assess
No complaints

## 2018-08-28 NOTE — PROGRESS NOTE BEHAVIORAL HEALTH - NSBHCONSULTMEDS_PSY_A_CORE FT
1. d/c haldol.  Monitor EKG for QTc<500.      2. Change VPA to 250mg po bid tomorrow. monitor LFTs, amylase/lipase.  Can decrease dose over next few days if pt no longer agitated/delirious.    3. PRN: Haldol 5mg PO/IV/IM q6h PRN agitation    4. May f/u as outpatient at [facility]- [phone]    5. [facility] psych will f/u.

## 2018-08-28 NOTE — PROGRESS NOTE BEHAVIORAL HEALTH - NS ED BHA MED ROS CONSTITUTIONAL SYMPTOMS
No complaints
Unable to assess
No complaints
No complaints

## 2018-08-28 NOTE — PROGRESS NOTE BEHAVIORAL HEALTH - NS ED BHA MED ROS NEUROLOGICAL
No complaints
No complaints
Unable to assess
Unable to assess
No complaints
No complaints
Unable to assess

## 2018-08-28 NOTE — PROGRESS NOTE BEHAVIORAL HEALTH - NSBHCONSFOLLOWNEEDS_PSY_A_CORE
Patient needs further psychiatric safety assessment prior to discharge
no psychiatric contraindications to discharge
Patient needs further psychiatric safety assessment prior to discharge
Patient needs further psychiatric safety assessment prior to discharge

## 2018-08-28 NOTE — DISCHARGE NOTE ADULT - PATIENT PORTAL LINK FT
You can access the [facility] system Patient Portal, offered by [facility], by registering with the following website: [email or web address]

## 2018-08-28 NOTE — PROGRESS NOTE BEHAVIORAL HEALTH - LANGUAGE
Other/No abnormalities noted
No abnormalities noted/Other
Other/No abnormalities noted
No abnormalities noted/Other

## 2018-08-28 NOTE — PROGRESS NOTE BEHAVIORAL HEALTH - PRIMARY DX
Delirium tremens
Alcohol dependence with withdrawal delirium
Delirium tremens
Delirium tremens

## 2018-08-29 LAB
CULTURE RESULTS: SIGNIFICANT CHANGE UP
CULTURE RESULTS: SIGNIFICANT CHANGE UP
SPECIMEN SOURCE: SIGNIFICANT CHANGE UP
SPECIMEN SOURCE: SIGNIFICANT CHANGE UP

## 2018-08-29 PROCEDURE — 71046 X-RAY EXAM CHEST 2 VIEWS: CPT | Mod: 26

## 2018-08-29 PROCEDURE — 74177 CT ABD & PELVIS W/CONTRAST: CPT | Mod: 26

## 2018-08-29 RX ORDER — APIXABAN 2.5 MG/1
10 TABLET, FILM COATED ORAL EVERY 12 HOURS
Qty: 0 | Refills: 0 | Status: DISCONTINUED | OUTPATIENT
Start: 2018-08-29 | End: 2018-08-31

## 2018-08-29 RX ORDER — VALPROIC ACID (AS SODIUM SALT) 250 MG/5ML
250 SOLUTION, ORAL ORAL
Qty: 0 | Refills: 0 | Status: DISCONTINUED | OUTPATIENT
Start: 2018-08-29 | End: 2018-08-29

## 2018-08-29 RX ORDER — DIVALPROEX SODIUM 500 MG/1
250 TABLET, DELAYED RELEASE ORAL
Qty: 0 | Refills: 0 | Status: DISCONTINUED | OUTPATIENT
Start: 2018-08-29 | End: 2018-08-31

## 2018-08-29 RX ADMIN — SENNA PLUS 2 TABLET(S): 8.6 TABLET ORAL at 22:03

## 2018-08-29 RX ADMIN — DIVALPROEX SODIUM 250 MILLIGRAM(S): 500 TABLET, DELAYED RELEASE ORAL at 17:37

## 2018-08-29 RX ADMIN — Medication 1 MILLIGRAM(S): at 11:43

## 2018-08-29 RX ADMIN — MAGNESIUM OXIDE 400 MG ORAL TABLET 200 MILLIGRAM(S): 241.3 TABLET ORAL at 17:37

## 2018-08-29 RX ADMIN — Medication 200 MILLIGRAM(S): at 22:03

## 2018-08-29 RX ADMIN — Medication 500 MILLIGRAM(S): at 06:01

## 2018-08-29 RX ADMIN — Medication 25 MILLIGRAM(S): at 17:37

## 2018-08-29 RX ADMIN — Medication 100 MILLIGRAM(S): at 17:37

## 2018-08-29 RX ADMIN — ENOXAPARIN SODIUM 70 MILLIGRAM(S): 100 INJECTION SUBCUTANEOUS at 05:56

## 2018-08-29 RX ADMIN — Medication 1 TABLET(S): at 11:43

## 2018-08-29 RX ADMIN — Medication 25 MILLIGRAM(S): at 05:56

## 2018-08-29 RX ADMIN — MAGNESIUM OXIDE 400 MG ORAL TABLET 200 MILLIGRAM(S): 241.3 TABLET ORAL at 09:52

## 2018-08-29 RX ADMIN — APIXABAN 10 MILLIGRAM(S): 2.5 TABLET, FILM COATED ORAL at 17:37

## 2018-08-29 RX ADMIN — TAMSULOSIN HYDROCHLORIDE 0.4 MILLIGRAM(S): 0.4 CAPSULE ORAL at 11:58

## 2018-08-29 NOTE — PROGRESS NOTE ADULT - ASSESSMENT
37 yo male with a long hx of ETOH abuse and multiple episodes of alcoholic pancreatis, admitted for recurrent pancreatitis and then developed DTs complicated by aspiration PNA and respiratory distress. seen no resting comfortably. Tolerating oral diet. For repeat CXR to assess the aspiration pneumonia.

## 2018-08-29 NOTE — CHART NOTE - NSCHARTNOTEFT_GEN_A_CORE
DVT right upper extremity    < from: VA Duplex Upper Ext Vein Scan, Bilat (08.28.18 @ 22:16) >    INTERPRETATION:  acute thrombosis in the right proximal brachial vein.    In addition, there is thrombosis in the bilateral basilic and cephalic   veins.  these findings were discussed with Pipo Kapoor at 22:07pm on   8/28/18 by the US technologist.  follow up official report    < end of copied text >    Plan  started on Lovenox 70mg BID  primary team, to follow up official read  pink band to right upper extremity    Dr. Augustine medical attending aware of the above    Will endorse to primary day team, attending to follow  Alis Kapoor NP  [facility] [id number]

## 2018-08-29 NOTE — PROGRESS NOTE ADULT - PROBLEM SELECTOR PLAN 1
discussed with Patient suggested AA but Patient states he doesn't want togo States he enjoys drinking  psych follow up for further care  continue valproic acid will eventually need to check a Depakote level

## 2018-08-29 NOTE — PROGRESS NOTE ADULT - ATTENDING COMMENTS
Recurrent alcoholism with acute alcoholic pancreatitis resulting in alcohol cssation and acute alcohol withdrawl with violent encephalopathy, now clearing. For CXR to assess aspiration and cough and repeat CTT of the Abdomen to R/O a post pancreatitis pseudocyst. Recurrent alcoholism with acute alcoholic pancreatitis resulting in alcohol cssation and acute alcohol withdrawl with violent encephalopathy, now clearing. For CXR to assess aspiration and cough and repeat CTT of the Abdomen to R/O a post pancreatitis pseudocyst.  I am a non participating [facility] physician seeing Pt in coverage for Dr Augustine Recurrent alcoholism with acute alcoholic pancreatitis resulting in alcohol cessation and acute alcohol withdrawal with violent encephalopathy, now clearing. For CXR to assess aspiration and cough and repeat CTT of the Abdomen to R/O a post pancreatitis pseudocyst.  I am a non participating [facility] physician seeing Pt in coverage for Dr Augustine. The above patient examination was reviewed with Dr. Menard and I agree with his evaluation, assessment and treatment plan.  Sam Augustine M.D.

## 2018-08-29 NOTE — CHART NOTE - NSCHARTNOTEFT_GEN_A_CORE
As per d/w with Dr. Menard, attending MD, pt is started on Eliquis- official report of doppler- Deep vein thrombosis in the right proximal brachial vein. Thrombosis in   the right basilic and cephalic veins.    Superficial thrombosis of the left basilic and cephalic veins.     D/w pt medication effect and side effect of risk of bleeding;  Hanna Summers(NP)  3 [facility], [phone]

## 2018-08-29 NOTE — PROGRESS NOTE ADULT - SUBJECTIVE AND OBJECTIVE BOX
MEDICATIONS  (STANDING):  apixaban 10 milliGRAM(s) Oral every 12 hours  diVALproex  milliGRAM(s) Oral two times a day  folic acid 1 milliGRAM(s) Oral daily  guaiFENesin    Syrup 200 milliGRAM(s) Oral at bedtime  magnesium oxide 200 milliGRAM(s) Oral two times a day with meals  metoprolol tartrate 25 milliGRAM(s) Oral every 12 hours  multivitamin 1 Tablet(s) Oral daily  senna 2 Tablet(s) Oral at bedtime  tamsulosin 0.4 milliGRAM(s) Oral daily  thiamine 100 milliGRAM(s) Oral daily    MEDICATIONS  (PRN):  ALBUTerol/ipratropium for Nebulization 3 milliLiter(s) Nebulizer every 6 hours PRN pna  benzocaine 15 mG/menthol 3.6 mG Lozenge 1 Lozenge Oral every 6 hours PRN Sore Throat  polyethylene glycol 3350 17 Gram(s) Oral daily PRN Constipation          VITALS:   T(C): 36.9 (08-30-18 @ 00:03), Max: 37.1 (08-29-18 @ 08:06)  HR: 86 (08-30-18 @ 00:03) (86 - 90)  BP: 127/81 (08-30-18 @ 00:03) (107/74 - 143/89)  RR: 18 (08-30-18 @ 00:03) (18 - 18)  SpO2: 99% (08-30-18 @ 00:03) (98% - 100%)  Wt(kg): --      PHYSICAL EXAM:  GENERAL: awake and alert  HEAD:  Atraumatic  EYES: EOM, PERRLA, conjunctiva pink and sclera white  ENT: No tonsillar erythema, exudates, or enlargement, moist mucous membranes, good dentition, no lesions  NECK: Supple, No JVD, normal thyroid, carotids with normal upstrokes and no bruits  CHEST/LUNG: Clear to auscultation bilaterally, No rales, rhonchi, wheezing, or rubs  HEART: Regular rate and rhythm, No murmurs, rubs, or gallops  ABDOMEN: Soft, nondistended, no masses, (No guarding, tenderness no rebound, bowel sounds present  EXTREMITIES:  2+ Peripheral Pulses, No clubbing, cyanosis, or edema. No arthritis of shoulders, elbows, hands, hips, knees, ankles, or feet. No DJD C spine, T spine, or L/S spine  LYMPH: No lymphadenopathy noted  SKIN: No rashes or lesions  NERVOUS SYSTEM:  awake and alert Motor Strength 5/5 right upper and right lower.  5/5 left upper and left lower extremities, DTRs 2+ intact and symmetric    LABS:                      CAPILLARY BLOOD GLUCOSE          RADIOLOGY & ADDITIONAL TESTS: MEDICATIONS  (STANDING):  apixaban 10 milliGRAM(s) Oral every 12 hours  diVALproex  milliGRAM(s) Oral two times a day  folic acid 1 milliGRAM(s) Oral daily  guaiFENesin    Syrup 200 milliGRAM(s) Oral at bedtime  magnesium oxide 200 milliGRAM(s) Oral two times a day with meals  metoprolol tartrate 25 milliGRAM(s) Oral every 12 hours  multivitamin 1 Tablet(s) Oral daily  senna 2 Tablet(s) Oral at bedtime  tamsulosin 0.4 milliGRAM(s) Oral daily  thiamine 100 milliGRAM(s) Oral daily    MEDICATIONS  (PRN):  ALBUTerol/ipratropium for Nebulization 3 milliLiter(s) Nebulizer every 6 hours PRN pna  benzocaine 15 mG/menthol 3.6 mG Lozenge 1 Lozenge Oral every 6 hours PRN Sore Throat  polyethylene glycol 3350 17 Gram(s) Oral daily PRN Constipation          VITALS:   T(C): 36.9 (08-30-18 @ 00:03), Max: 37.1 (08-29-18 @ 08:06)  HR: 86 (08-30-18 @ 00:03) (86 - 90)  BP: 127/81 (08-30-18 @ 00:03) (107/74 - 143/89)  RR: 18 (08-30-18 @ 00:03) (18 - 18)  SpO2: 99% (08-30-18 @ 00:03) (98% - 100%)  Wt(kg): --      PHYSICAL EXAM:  GENERAL: awake and alert  HEAD:  Atraumatic  EYES: EOM, PERRLA, conjunctiva pink and sclera white  ENT: No tonsillar erythema, exudates, or enlargement, moist mucous membranes, good dentition, no lesions  NECK: Supple, No JVD, normal thyroid, carotids with normal upstrokes and no bruits  CHEST/LUNG: Clear to auscultation bilaterally, No rales, rhonchi, wheezing, or rubs  HEART: Regular rate and rhythm, No murmurs, rubs, or gallops  ABDOMEN: Soft, nondistended, no masses, (No guarding, tenderness no rebound, bowel sounds present  EXTREMITIES:  2+ Peripheral Pulses, No clubbing, cyanosis, or edema. No arthritis of shoulders, elbows, hands, hips, knees, ankles, or feet. No DJD C spine, T spine, or L/S spine  LYMPH: No lymphadenopathy noted  SKIN: No rashes or lesions  NERVOUS SYSTEM:  awake and alert Motor Strength 5/5 right upper and right lower.  5/5 left upper and left lower extremities, DTRs 2+ intact and symmetric    RADIOLOGY & ADDITIONAL TESTS:    < from: CT Abdomen and Pelvis w/ Oral Cont and w/ IV Cont (08.29.18 @ 09:07) >   CT abdomen pelvis 8/17/2018    PROCEDURE:   CT of the Abdomen and Pelvis was performed with intravenous contrast.   Intravenous contrast: 90 ml Omnipaque 350. 10 ml discarded.  Oral contrast: positive contrast was administered.  Sagittal and coronal reformats were performed.    FINDINGS:    LOWER CHEST: Bibasilar linear type atelectasis.    LIVER: Punctate calcification in the seventh segment of the liver.  BILE DUCTS: Normal caliber.  GALLBLADDER: Within normal limits.  SPLEEN: Mild splenomegaly.  PANCREAS: Homogeneous pancreatic enhancement, without evidence of   necrosis. Minimal inflammation surrounds the pancreatic head, improved   from prior imaging. No peripancreatic collection.  ADRENALS: Within normal limits.  KIDNEYS/URETERS: Within normal limits.    BLADDER: Diffuse bladder wall thickening.  REPRODUCTIVE ORGANS: The prostate is within normal limits.    BOWEL: Mild scattered colonic diverticulosis without diverticulitis. No   bowel obstruction. Appendix is normal.  PERITONEUM: No ascites.  VESSELS:  Splenic vein, SMV and portal veins are patent.  RETROPERITONEUM: No lymphadenopathy.  ABDOMINAL WALL: Small bilateral fat-containing inguinal hernias.  BONES: Degenerative changes of the spine.     IMPRESSION:    Improving pancreatitis. No peripancreatic collection.    Mild diffuse bladder wall thickening is nonspecific. Correlate for   cystitis.    < end of copied text >  < from: CT Abdomen and Pelvis w/ Oral Cont and w/ IV Cont (08.29.18 @ 09:07) >   CT abdomen pelvis 8/17/2018
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
## 2018-08-29 NOTE — PROVIDER CONTACT NOTE (OTHER) - SITUATION
pt refusing bladder scan q 6 hrs. pt is saving urine in urinal for I&O purposes. pt denies abd distention.
pt refusing miralax at this time. per pt BM today.
pts labs were not drawn today. per pt phlebotomy was unable to get blood. Per lab slip phlebotomy wrote pt refused.
Increase CIWA score of 15. Severe hallucinations and disorientation.
Pt has secretions in the back of his throat that he is unable to clear. Pulse ox 91-93% when previously % the prior night.
Pt ordered for Stat Enoxaparin 70mg subcutaneous
Pt refusing all medications, telemetry and pulled PIV.
Pt's CIWA score increased to 9.
[facility] 22
pt refusing bladder scan q6hrs as ordered.

## 2018-08-30 RX ADMIN — DIVALPROEX SODIUM 250 MILLIGRAM(S): 500 TABLET, DELAYED RELEASE ORAL at 17:25

## 2018-08-30 RX ADMIN — APIXABAN 10 MILLIGRAM(S): 2.5 TABLET, FILM COATED ORAL at 17:24

## 2018-08-30 RX ADMIN — SENNA PLUS 2 TABLET(S): 8.6 TABLET ORAL at 21:42

## 2018-08-30 RX ADMIN — Medication 1 TABLET(S): at 11:38

## 2018-08-30 RX ADMIN — Medication 25 MILLIGRAM(S): at 06:18

## 2018-08-30 RX ADMIN — MAGNESIUM OXIDE 400 MG ORAL TABLET 200 MILLIGRAM(S): 241.3 TABLET ORAL at 17:24

## 2018-08-30 RX ADMIN — TAMSULOSIN HYDROCHLORIDE 0.4 MILLIGRAM(S): 0.4 CAPSULE ORAL at 11:38

## 2018-08-30 RX ADMIN — Medication 200 MILLIGRAM(S): at 21:42

## 2018-08-30 RX ADMIN — DIVALPROEX SODIUM 250 MILLIGRAM(S): 500 TABLET, DELAYED RELEASE ORAL at 05:45

## 2018-08-30 RX ADMIN — Medication 1 MILLIGRAM(S): at 11:38

## 2018-08-30 RX ADMIN — Medication 25 MILLIGRAM(S): at 17:24

## 2018-08-30 RX ADMIN — MAGNESIUM OXIDE 400 MG ORAL TABLET 200 MILLIGRAM(S): 241.3 TABLET ORAL at 08:20

## 2018-08-30 RX ADMIN — APIXABAN 10 MILLIGRAM(S): 2.5 TABLET, FILM COATED ORAL at 05:44

## 2018-08-30 RX ADMIN — Medication 100 MILLIGRAM(S): at 11:38

## 2018-08-30 NOTE — PROGRESS NOTE ADULT - ASSESSMENT
39 yo male with a long hx of ETOH abuse and multiple episodes of alcoholic pancreatis, admitted for recurrent pancreatitis and then developed DTs complicated by aspiration PNA and respiratory distress. seen no resting comfortably. Tolerating oral diet. For repeat CXR to assess the aspiration pneumonia.

## 2018-08-30 NOTE — PROVIDER CONTACT NOTE (OTHER) - ASSESSMENT
pt alert and oriented times three, pt afebrile
A&Ox4. Pt is agitated & code flight was called. Pt c/o of roommate family disruptive. Pt CIWA.
Pt AAOx4, extremely agitated, pacing in alarcon. Severe hallucinations and disorientation. Pt said he saw dogs in the hallway and thought he was in a gym. VSS.
Pt AAOx4, extremely agitated. VSS.
Pt AAOx4, in near panic state stating that we are kidnapping him. VSS.
When suctioning the patient his secretions were thick and yellow. However, the patient is severely agitated and will not allow suctioning.
pt a&ox4, VSS, able to make needs known.   pt states "I am urinating fine, I don't need and want that". pt denies any pain, no supra pubic tenderness noted. states able to void when necessary, denies any difficulty iniating urine
pt a&ox4, c/o of pain and discomfort to RUE

## 2018-08-30 NOTE — PROVIDER CONTACT NOTE (OTHER) - BACKGROUND
pt admitted alcohol withdrawl, pancreatitis, PNA
pts labs were not drawn yesterday
39 y/o male admitted for alcohol induced pancreatitis .
39 y/o male s/p VA duplex of upper extremity
Admitting diagnosis pancreatitis
Pt admitted for pancreatitis 2/2 to ETOH abuse. Increase CIWA score of 15. Severe hallucinations and disorientation. RRT called for concern for pt's safety.
Pt admitted for pancreatitis 2/2 to ETOH abuse. Pt is becoming increasing agitated threatening to elope. Pt now refusing all medication including PRN Ativan.
Pt admitted for pancreatitis 2/2 to ETOH abuse. Pt now refusing all medication including PRN Ativan. Pt also pulled PIV and telemetry leads and is refusing reinsertion and telemetry.
Pt is on a precedex gtt and is very lethargic.

## 2018-08-30 NOTE — PROGRESS NOTE ADULT - PROBLEM SELECTOR PLAN 1
discussed with Patient suggested AA but Patient states he doesn't want to go States he enjoys drinking  psych follow up for further care  continue valproic acid will eventually need to check a Depakote level
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
## 2018-08-30 NOTE — PROGRESS NOTE ADULT - ATTENDING COMMENTS
Recurrent alcoholism with acute alcoholic pancreatitis resulting in alcohol cessation and acute alcohol withdrawal with violent encephalopathy, now clearing. For CXR to assess aspiration and cough and repeat CTT of the Abdomen to R/O a post pancreatitis pseudocyst. DC planning home  I am a non participating [facility] physician seeing Pt in coverage for Dr Augustine. The above patient examination was reviewed with Dr. Menard and I agree with his evaluation, assessment and treatment plan.  Sam Augustine M.D.
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
## 2018-08-30 NOTE — PROVIDER CONTACT NOTE (OTHER) - NAME OF MD/NP/PA/DO NOTIFIED:
RACHELLE Summers
Elsy Sosa PA-C
Elsy Sosa PA-C
Hussein CORONA, Payton CORONA
Naa Sen NP
RACHELLE Kapoor
RACHELLE Kapoor
RACHELLE Summers
RRT

## 2018-08-30 NOTE — PROVIDER CONTACT NOTE (OTHER) - ACTION/TREATMENT ORDERED:
RACHELLE Summers aware of above, no addl tx at this time.
RACHELLE Summers aware of above,. no addl tx at this time
NP Hanna Summers aware of above, to order am  labs,
phlebotomy team attempted twice to obtain pt blood specimen unsuccesfully ,  NP Hanna Summers aware, will attempt to obtain blood specimens on 8/31/18
Continue to monitor
PA came to assess and educate pt. Pt still adamantly refusing. I am instructed to call RRT if situation worsens. Continue to monitor
Pt was given 2mg Ativan IV. Pt transferred off unit to [facility] for private room.
Security called. 4mg IM Ativan given.
as per NP Alis forte to give PT Stat Enoxaparin 70mg. educated pt on indication for Enoxaparin 70mg. risk and benefits explained.
Continue to monitor.
risk and benefits explained, pt educated on use of urinal to measure output.  verbalized understanding. will continue to monitor.

## 2018-08-30 NOTE — PROVIDER CONTACT NOTE (OTHER) - DATE AND TIME:
28-Aug-2018 12:00
28-Aug-2018 16:02
29-Aug-2018 15:30
18-Aug-2018 18:06
18-Aug-2018 22:00
19-Aug-2018 04:25
19-Aug-2018 23:00
20-Aug-2018 20:30
28-Aug-2018 22:20
29-Aug-2018 00:00
30-Aug-2018 16:10

## 2018-08-31 VITALS
RESPIRATION RATE: 18 BRPM | DIASTOLIC BLOOD PRESSURE: 81 MMHG | OXYGEN SATURATION: 99 % | HEART RATE: 96 BPM | SYSTOLIC BLOOD PRESSURE: 127 MMHG | TEMPERATURE: 98 F

## 2018-08-31 PROCEDURE — 84295 ASSAY OF SERUM SODIUM: CPT

## 2018-08-31 PROCEDURE — 82150 ASSAY OF AMYLASE: CPT

## 2018-08-31 PROCEDURE — 82947 ASSAY GLUCOSE BLOOD QUANT: CPT

## 2018-08-31 PROCEDURE — 82803 BLOOD GASES ANY COMBINATION: CPT

## 2018-08-31 PROCEDURE — 87086 URINE CULTURE/COLONY COUNT: CPT

## 2018-08-31 PROCEDURE — 85610 PROTHROMBIN TIME: CPT

## 2018-08-31 PROCEDURE — 83690 ASSAY OF LIPASE: CPT

## 2018-08-31 PROCEDURE — 85730 THROMBOPLASTIN TIME PARTIAL: CPT

## 2018-08-31 PROCEDURE — 84132 ASSAY OF SERUM POTASSIUM: CPT

## 2018-08-31 PROCEDURE — 83735 ASSAY OF MAGNESIUM: CPT

## 2018-08-31 PROCEDURE — 87070 CULTURE OTHR SPECIMN AEROBIC: CPT

## 2018-08-31 PROCEDURE — 81001 URINALYSIS AUTO W/SCOPE: CPT

## 2018-08-31 PROCEDURE — 94150 VITAL CAPACITY TEST: CPT

## 2018-08-31 PROCEDURE — 80053 COMPREHEN METABOLIC PANEL: CPT

## 2018-08-31 PROCEDURE — 85014 HEMATOCRIT: CPT

## 2018-08-31 PROCEDURE — 74177 CT ABD & PELVIS W/CONTRAST: CPT

## 2018-08-31 PROCEDURE — 84100 ASSAY OF PHOSPHORUS: CPT

## 2018-08-31 PROCEDURE — 82550 ASSAY OF CK (CPK): CPT

## 2018-08-31 PROCEDURE — 96375 TX/PRO/DX INJ NEW DRUG ADDON: CPT

## 2018-08-31 PROCEDURE — 96374 THER/PROPH/DIAG INJ IV PUSH: CPT

## 2018-08-31 PROCEDURE — 71046 X-RAY EXAM CHEST 2 VIEWS: CPT

## 2018-08-31 PROCEDURE — 83605 ASSAY OF LACTIC ACID: CPT

## 2018-08-31 PROCEDURE — 93005 ELECTROCARDIOGRAM TRACING: CPT

## 2018-08-31 PROCEDURE — 80307 DRUG TEST PRSMV CHEM ANLYZR: CPT

## 2018-08-31 PROCEDURE — 73630 X-RAY EXAM OF FOOT: CPT

## 2018-08-31 PROCEDURE — 99285 EMERGENCY DEPT VISIT HI MDM: CPT | Mod: 25

## 2018-08-31 PROCEDURE — 97162 PT EVAL MOD COMPLEX 30 MIN: CPT

## 2018-08-31 PROCEDURE — 94640 AIRWAY INHALATION TREATMENT: CPT

## 2018-08-31 PROCEDURE — 80184 ASSAY OF PHENOBARBITAL: CPT

## 2018-08-31 PROCEDURE — 80048 BASIC METABOLIC PNL TOTAL CA: CPT

## 2018-08-31 PROCEDURE — 93970 EXTREMITY STUDY: CPT

## 2018-08-31 PROCEDURE — 82330 ASSAY OF CALCIUM: CPT

## 2018-08-31 PROCEDURE — 71045 X-RAY EXAM CHEST 1 VIEW: CPT

## 2018-08-31 PROCEDURE — 82435 ASSAY OF BLOOD CHLORIDE: CPT

## 2018-08-31 PROCEDURE — 80074 ACUTE HEPATITIS PANEL: CPT

## 2018-08-31 PROCEDURE — 82962 GLUCOSE BLOOD TEST: CPT

## 2018-08-31 PROCEDURE — 87040 BLOOD CULTURE FOR BACTERIA: CPT

## 2018-08-31 PROCEDURE — 80076 HEPATIC FUNCTION PANEL: CPT

## 2018-08-31 PROCEDURE — 85027 COMPLETE CBC AUTOMATED: CPT

## 2018-08-31 RX ORDER — THIAMINE MONONITRATE (VIT B1) 100 MG
1 TABLET ORAL
Qty: 0 | Refills: 0 | DISCHARGE
Start: 2018-08-31

## 2018-08-31 RX ORDER — SENNA PLUS 8.6 MG/1
2 TABLET ORAL
Qty: 0 | Refills: 0 | DISCHARGE
Start: 2018-08-31

## 2018-08-31 RX ORDER — TAMSULOSIN HYDROCHLORIDE 0.4 MG/1
1 CAPSULE ORAL
Qty: 30 | Refills: 0
Start: 2018-08-31 | End: 2018-09-29

## 2018-08-31 RX ORDER — VALPROIC ACID (AS SODIUM SALT) 250 MG/5ML
250 SOLUTION, ORAL ORAL DAILY
Qty: 0 | Refills: 0 | Status: DISCONTINUED | OUTPATIENT
Start: 2018-08-31 | End: 2018-08-31

## 2018-08-31 RX ORDER — MAGNESIUM OXIDE 400 MG ORAL TABLET 241.3 MG
0.5 TABLET ORAL
Qty: 0 | Refills: 0 | COMMUNITY
Start: 2018-08-31

## 2018-08-31 RX ORDER — METOPROLOL TARTRATE 50 MG
1 TABLET ORAL
Qty: 60 | Refills: 0
Start: 2018-08-31 | End: 2018-09-29

## 2018-08-31 RX ORDER — MAGNESIUM OXIDE 400 MG ORAL TABLET 241.3 MG
0.5 TABLET ORAL
Qty: 7 | Refills: 0
Start: 2018-08-31 | End: 2018-09-06

## 2018-08-31 RX ORDER — TAMSULOSIN HYDROCHLORIDE 0.4 MG/1
1 CAPSULE ORAL
Qty: 0 | Refills: 0 | COMMUNITY
Start: 2018-08-31

## 2018-08-31 RX ORDER — APIXABAN 2.5 MG/1
1 TABLET, FILM COATED ORAL
Qty: 60 | Refills: 0 | OUTPATIENT
Start: 2018-08-31 | End: 2018-09-29

## 2018-08-31 RX ORDER — DIVALPROEX SODIUM 500 MG/1
250 TABLET, DELAYED RELEASE ORAL DAILY
Qty: 0 | Refills: 0 | Status: DISCONTINUED | OUTPATIENT
Start: 2018-08-31 | End: 2018-08-31

## 2018-08-31 RX ORDER — APIXABAN 2.5 MG/1
2 TABLET, FILM COATED ORAL
Qty: 16 | Refills: 0
Start: 2018-08-31 | End: 2018-09-03

## 2018-08-31 RX ORDER — MULTIVIT-MIN/FERROUS GLUCONATE 9 MG/15 ML
1 LIQUID (ML) ORAL
Qty: 0 | Refills: 0 | COMMUNITY

## 2018-08-31 RX ORDER — DIVALPROEX SODIUM 500 MG/1
1 TABLET, DELAYED RELEASE ORAL
Qty: 5 | Refills: 0
Start: 2018-08-31 | End: 2018-09-04

## 2018-08-31 RX ORDER — APIXABAN 2.5 MG/1
1 TABLET, FILM COATED ORAL
Qty: 60 | Refills: 0
Start: 2018-08-31 | End: 2018-09-29

## 2018-08-31 RX ORDER — VALPROIC ACID (AS SODIUM SALT) 250 MG/5ML
1 SOLUTION, ORAL ORAL
Qty: 0 | Refills: 0 | COMMUNITY
Start: 2018-08-31

## 2018-08-31 RX ADMIN — Medication 1 MILLIGRAM(S): at 11:40

## 2018-08-31 RX ADMIN — Medication 100 MILLIGRAM(S): at 11:40

## 2018-08-31 RX ADMIN — APIXABAN 10 MILLIGRAM(S): 2.5 TABLET, FILM COATED ORAL at 06:08

## 2018-08-31 RX ADMIN — DIVALPROEX SODIUM 250 MILLIGRAM(S): 500 TABLET, DELAYED RELEASE ORAL at 06:08

## 2018-08-31 RX ADMIN — MAGNESIUM OXIDE 400 MG ORAL TABLET 200 MILLIGRAM(S): 241.3 TABLET ORAL at 08:34

## 2018-08-31 RX ADMIN — TAMSULOSIN HYDROCHLORIDE 0.4 MILLIGRAM(S): 0.4 CAPSULE ORAL at 11:40

## 2018-08-31 RX ADMIN — Medication 25 MILLIGRAM(S): at 06:08

## 2018-08-31 RX ADMIN — Medication 1 TABLET(S): at 11:40

## 2018-08-31 NOTE — PROGRESS NOTE ADULT - PROBLEM SELECTOR PROBLEM 3
Alcohol abuse
Alcohol-induced acute pancreatitis with uninfected necrosis
Alcohol abuse

## 2018-08-31 NOTE — PROGRESS NOTE ADULT - PROBLEM SELECTOR PROBLEM 2
Essential hypertension
Alcohol dependence with withdrawal delirium
Essential hypertension

## 2018-08-31 NOTE — PROGRESS NOTE ADULT - ASSESSMENT
37 yo male with a long hx of ETOH abuse and multiple episodes of alcoholic pancreatis, admitted for recurrent pancreatitis and then developed DTs complicated by aspiration PNA and respiratory distress. seen no resting comfortably. Tolerating oral diet. For repeat CXR to assess the aspiration pneumonia.  Xray Clear  Physically back to baseline.

## 2018-08-31 NOTE — PROGRESS NOTE ADULT - SUBJECTIVE AND OBJECTIVE BOX
INTERVAL HPI/OVERNIGHT EVENTS: doing well, no gi complaints, repeat ct scan showed no complications from pancreatitis    MEDICATIONS  (STANDING):  apixaban 10 milliGRAM(s) Oral every 12 hours  diVALproex  milliGRAM(s) Oral two times a day  folic acid 1 milliGRAM(s) Oral daily  guaiFENesin    Syrup 200 milliGRAM(s) Oral at bedtime  magnesium oxide 200 milliGRAM(s) Oral two times a day with meals  metoprolol tartrate 25 milliGRAM(s) Oral every 12 hours  multivitamin 1 Tablet(s) Oral daily  senna 2 Tablet(s) Oral at bedtime  tamsulosin 0.4 milliGRAM(s) Oral daily  thiamine 100 milliGRAM(s) Oral daily    MEDICATIONS  (PRN):  ALBUTerol/ipratropium for Nebulization 3 milliLiter(s) Nebulizer every 6 hours PRN pna  benzocaine 15 mG/menthol 3.6 mG Lozenge 1 Lozenge Oral every 6 hours PRN Sore Throat  polyethylene glycol 3350 17 Gram(s) Oral daily PRN Constipation      Allergies    No Known Allergies    Intolerances            PHYSICAL EXAM:   Vital Signs:  Vital Signs Last 24 Hrs  T(C): 36.8 (31 Aug 2018 06:07), Max: 37.3 (30 Aug 2018 12:45)  T(F): 98.3 (31 Aug 2018 06:07), Max: 99.1 (30 Aug 2018 12:45)  HR: 71 (31 Aug 2018 06:07) (71 - 87)  BP: 149/93 (31 Aug 2018 06:07) (113/79 - 149/93)  BP(mean): --  RR: 18 (31 Aug 2018 06:07) (18 - 18)  SpO2: 98% (31 Aug 2018 06:07) (97% - 98%)  Daily     Daily Weight in k (31 Aug 2018 07:00)    GENERAL:  no distress  HEENT:  NC/AT,  anicteric  CHEST:   no increased effort, breath sounds clear  HEART:  Regular rhythm  ABDOMEN:  Soft, non-tender, non-distended, normoactive bowel sounds,  no masses ,no hepato-splenomegaly, no signs of chronic liver disease  EXTEREMITIES:  no cyanosis      LABS:                RADIOLOGY & ADDITIONAL TESTS:

## 2018-08-31 NOTE — PROGRESS NOTE ADULT - PROBLEM SELECTOR PROBLEM 1
Alcohol-induced acute pancreatitis without infection or necrosis
Urinary retention with incomplete bladder emptying
Urinary retention with incomplete bladder emptying
Alcohol-induced acute pancreatitis without infection or necrosis

## 2018-08-31 NOTE — PROGRESS NOTE ADULT - PROVIDER SPECIALTY LIST ADULT
Gastroenterology
Internal Medicine
MICU
Urology
Urology
Gastroenterology
MICU
MICU
Gastroenterology
MICU
Internal Medicine

## 2018-08-31 NOTE — CHART NOTE - NSCHARTNOTEFT_GEN_A_CORE
follow up- As per d/w Dr. Pablo from psychiatry, discontinue depakote; pt is with stable mood; d/w pt.  Hanna Summers(NP)  3 [facility], [phone] follow up- As per d/w Dr. Pablo from psychiatry,  depakote can be discontinued as pt  with stable mood; d/w Dr. Choi, attending MD and advised to taper dose to depakote 250mg oral daily for 5 days; d/w pt.  Hanna Summers(NP)  3 [facility], [phone]

## 2018-08-31 NOTE — PROGRESS NOTE ADULT - PROBLEM SELECTOR PLAN 4
no new fevers  continue incentive spirometer  continue physical therapy  Patient off abx
possible aspiration pneumonia on IV zosyn Repeat CXR  Repeat amylase lipase  pulmonary toilet with suggested aspiration
continue to follow temp   may need to reevaluated
no new fevers  continue incentive spirometer  continue physical therapy  Patient off abx
possible aspiration pneumonia on IV zosyn Repeat CXR  Repeat amylase lipase

## 2018-08-31 NOTE — PROGRESS NOTE ADULT - PROBLEM SELECTOR PLAN 3
CIWA protocol.  He progressed to full blown DTs and was transferred to the MICU for safety and further aggressive treatment of alcoholism.
CIWA protocol
CIWA protocol.  He progressed to full blown DTs and was transferred to the MICU for safety and further aggressive treatment of alcoholism.
YARI protocol.  He progressed to full blown DTs and was transferred to the MICU for safety and further aggressive treatment of alcoholism. symptoms beginning to wane. continue supervision
symptoms of DTs resolved  continue current meds
symptoms of DTs resolving  continue current meds
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
## 2018-08-31 NOTE — PROGRESS NOTE ADULT - PROBLEM SELECTOR PLAN 1
discussed with Patient suggested AA but Patient states he doesn't want to go States he enjoys drinking but will stop and only stick to marijuana.  psych follow up for further care  continue valproic acid will eventually need to check a Depakote level taper off medication Over 5 days.  discharge today

## 2019-01-15 ENCOUNTER — APPOINTMENT (OUTPATIENT)
Dept: ORTHOPEDIC SURGERY | Facility: HOSPITAL | Age: 39
End: 2019-01-15

## 2019-06-05 PROBLEM — K85.20 ALCOHOL INDUCED ACUTE PANCREATITIS WITHOUT NECROSIS OR INFECTION: Chronic | Status: ACTIVE | Noted: 2018-08-17

## 2019-06-19 ENCOUNTER — APPOINTMENT (OUTPATIENT)
Dept: ORTHOPEDIC SURGERY | Facility: CLINIC | Age: 39
End: 2019-06-19
Payer: COMMERCIAL

## 2019-06-19 VITALS — DIASTOLIC BLOOD PRESSURE: 86 MMHG | SYSTOLIC BLOOD PRESSURE: 145 MMHG | HEART RATE: 71 BPM

## 2019-06-19 PROCEDURE — 99204 OFFICE O/P NEW MOD 45 MIN: CPT

## 2019-07-12 NOTE — PROGRESS NOTE ADULT - SUBJECTIVE AND OBJECTIVE BOX
INTERVAL HPI/OVERNIGHT EVENTS: Having MS issues, labs improved, febrile yesterday on Abx, Ct shows focal pancreatitis without necrosis, amyles/lipase nl    MEDICATIONS  (STANDING):  azithromycin  IVPB      cefTRIAXone   IVPB      dexmedetomidine Infusion 0.1 MICROgram(s)/kG/Hr (1.815 mL/Hr) IV Continuous <Continuous>  dextrose 5% + lactated ringers. 1000 milliLiter(s) (100 mL/Hr) IV Continuous <Continuous>  folic acid 1 milliGRAM(s) Oral daily  magnesium oxide 200 milliGRAM(s) Oral two times a day with meals  metoprolol tartrate Injectable 5 milliGRAM(s) IV Push every 6 hours  multivitamin 1 Tablet(s) Oral daily  multivitamin/thiamine/folic acid in sodium chloride 0.9% 1000 milliLiter(s) (100 mL/Hr) IV Continuous <Continuous>  OLANZapine Injectable 7.5 milliGRAM(s) IntraMuscular every 8 hours  pantoprazole  Injectable 40 milliGRAM(s) IV Push two times a day  PHENobarbital Injectable 130 milliGRAM(s) IV Push every 6 hours  thiamine IVPB 500 milliGRAM(s) IV Intermittent three times a day    MEDICATIONS  (PRN):  haloperidol    Injectable 5 milliGRAM(s) IV Push every 4 hours PRN Agitiation  HYDROmorphone  Injectable 1 milliGRAM(s) IV Push every 4 hours PRN Severe Pain (7 - 10)  ondansetron Injectable 4 milliGRAM(s) IV Push every 8 hours PRN Nausea  PHENobarbital Injectable 130 milliGRAM(s) IV Push every 15 minutes PRN agitation      Allergies    No Known Allergies    Intolerances            PHYSICAL EXAM:   Vital Signs:  Vital Signs Last 24 Hrs  T(C): 36.9 (21 Aug 2018 07:00), Max: 38.5 (21 Aug 2018 00:00)  T(F): 98.5 (21 Aug 2018 07:00), Max: 101.3 (21 Aug 2018 00:00)  HR: 78 (21 Aug 2018 09:00) (78 - 95)  BP: 100/65 (21 Aug 2018 09:00) (92/51 - 191/111)  BP(mean): 79 (21 Aug 2018 09:00) (65 - 141)  RR: 22 (21 Aug 2018 09:00) (20 - 48)  SpO2: 100% (21 Aug 2018 09:00) (91% - 100%)  Daily     Daily Weight in k.3 (21 Aug 2018 04:00)    GENERAL:  no distress  HEENT:  NC/AT,  anicteric  CHEST:   no increased effort, breath sounds clear  HEART:  Regular rhythm  ABDOMEN:  Soft, non-tender, non-distended, normoactive bowel sounds,  no masses ,no hepato-splenomegaly, no signs of chronic liver disease  EXTEREMITIES:  no cyanosis      LABS:                        14.3   6.3   )-----------( 96       ( 21 Aug 2018 01:33 )             40.4     08    140  |  103  |  5<L>  ----------------------------<  127<H>  3.6   |  21<L>  |  0.82    Ca    8.8      21 Aug 2018 01:34  Phos  2.1       Mg     1.5         TPro  6.3  /  Alb  3.7  /  TBili  1.8<H>  /  DBili  x   /  AST  30  /  ALT  26  /  AlkPhos  37<L>  08-    PT/INR - ( 21 Aug 2018 01:34 )   PT: 15.7 sec;   INR: 1.43 ratio         PTT - ( 21 Aug 2018 01:34 )  PTT:23.7 sec  Urinalysis Basic - ( 21 Aug 2018 02:17 )    Color: Yellow / Appearance: SL Turbid / S.010 / pH: x  Gluc: x / Ketone: Moderate  / Bili: Negative / Urobili: Negative   Blood: x / Protein: Trace / Nitrite: Negative   Leuk Esterase: Large / RBC: 3-5 /HPF / WBC 26-50 /HPF   Sq Epi: x / Non Sq Epi: OCC /HPF / Bacteria: Few /HPF        RADIOLOGY & ADDITIONAL TESTS: see neck section

## 2019-07-31 ENCOUNTER — APPOINTMENT (OUTPATIENT)
Dept: ORTHOPEDIC SURGERY | Facility: CLINIC | Age: 39
End: 2019-07-31
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
## 2019-08-26 ENCOUNTER — APPOINTMENT (OUTPATIENT)
Dept: ORTHOPEDIC SURGERY | Facility: CLINIC | Age: 39
End: 2019-08-26

## 2019-09-04 ENCOUNTER — CHART COPY (OUTPATIENT)
Age: 39
End: 2019-09-04

## 2019-09-18 ENCOUNTER — APPOINTMENT (OUTPATIENT)
Dept: ORTHOPEDIC SURGERY | Facility: CLINIC | Age: 39
End: 2019-09-18
Payer: COMMERCIAL

## 2019-09-18 VITALS — BODY MASS INDEX: 24.11 KG/M2 | WEIGHT: 150 LBS | HEIGHT: 66 IN

## 2019-09-18 VITALS — HEART RATE: 66 BPM | DIASTOLIC BLOOD PRESSURE: 111 MMHG | SYSTOLIC BLOOD PRESSURE: 169 MMHG

## 2019-09-18 PROCEDURE — 99214 OFFICE O/P EST MOD 30 MIN: CPT

## 2019-10-03 ENCOUNTER — EMERGENCY (EMERGENCY)
Facility: HOSPITAL | Age: 39
LOS: 1 days | Discharge: HOME | End: 2019-10-03
Attending: EMERGENCY MEDICINE
Payer: COMMERCIAL

## 2019-10-03 VITALS
HEART RATE: 86 BPM | WEIGHT: 139.99 LBS | SYSTOLIC BLOOD PRESSURE: 192 MMHG | TEMPERATURE: 98 F | RESPIRATION RATE: 17 BRPM | OXYGEN SATURATION: 99 % | HEIGHT: 67 IN | DIASTOLIC BLOOD PRESSURE: 137 MMHG

## 2019-10-03 VITALS — SYSTOLIC BLOOD PRESSURE: 151 MMHG | HEART RATE: 88 BPM | DIASTOLIC BLOOD PRESSURE: 98 MMHG

## 2019-10-03 DIAGNOSIS — Z98.890 OTHER SPECIFIED POSTPROCEDURAL STATES: Chronic | ICD-10-CM

## 2019-10-03 DIAGNOSIS — Z87.09 PERSONAL HISTORY OF OTHER DISEASES OF THE RESPIRATORY SYSTEM: Chronic | ICD-10-CM

## 2019-10-03 LAB
ALBUMIN SERPL ELPH-MCNC: 5.3 G/DL — HIGH (ref 3.3–5)
ALP SERPL-CCNC: 45 U/L — SIGNIFICANT CHANGE UP (ref 40–120)
ALT FLD-CCNC: 42 U/L — SIGNIFICANT CHANGE UP (ref 10–45)
ANION GAP SERPL CALC-SCNC: 18 MMOL/L — HIGH (ref 5–17)
APPEARANCE UR: CLEAR — SIGNIFICANT CHANGE UP
APTT BLD: 31.8 SEC — SIGNIFICANT CHANGE UP (ref 27.5–36.3)
AST SERPL-CCNC: 46 U/L — HIGH (ref 10–40)
BACTERIA # UR AUTO: NEGATIVE — SIGNIFICANT CHANGE UP
BASE EXCESS BLDV CALC-SCNC: 5.9 MMOL/L — HIGH (ref -2–2)
BASOPHILS # BLD AUTO: 0 K/UL — SIGNIFICANT CHANGE UP (ref 0–0.2)
BASOPHILS NFR BLD AUTO: 0 % — SIGNIFICANT CHANGE UP (ref 0–2)
BILIRUB SERPL-MCNC: 1.8 MG/DL — HIGH (ref 0.2–1.2)
BILIRUB UR-MCNC: NEGATIVE — SIGNIFICANT CHANGE UP
BUN SERPL-MCNC: 16 MG/DL — SIGNIFICANT CHANGE UP (ref 7–23)
CA-I SERPL-SCNC: 1.13 MMOL/L — SIGNIFICANT CHANGE UP (ref 1.12–1.3)
CALCIUM SERPL-MCNC: 10.6 MG/DL — HIGH (ref 8.4–10.5)
CHLORIDE BLDV-SCNC: 98 MMOL/L — SIGNIFICANT CHANGE UP (ref 96–108)
CHLORIDE SERPL-SCNC: 93 MMOL/L — LOW (ref 96–108)
CO2 BLDV-SCNC: 31 MMOL/L — HIGH (ref 22–30)
CO2 SERPL-SCNC: 25 MMOL/L — SIGNIFICANT CHANGE UP (ref 22–31)
COLOR SPEC: YELLOW — SIGNIFICANT CHANGE UP
CREAT SERPL-MCNC: 0.75 MG/DL — SIGNIFICANT CHANGE UP (ref 0.5–1.3)
DIFF PNL FLD: NEGATIVE — SIGNIFICANT CHANGE UP
EOSINOPHIL # BLD AUTO: 0.04 K/UL — SIGNIFICANT CHANGE UP (ref 0–0.5)
EOSINOPHIL NFR BLD AUTO: 1 % — SIGNIFICANT CHANGE UP (ref 0–6)
EPI CELLS # UR: 1 /HPF — SIGNIFICANT CHANGE UP
ETHANOL SERPL-MCNC: SIGNIFICANT CHANGE UP MG/DL (ref 0–10)
GAS PNL BLDV: 132 MMOL/L — LOW (ref 135–145)
GAS PNL BLDV: SIGNIFICANT CHANGE UP
GLUCOSE BLDV-MCNC: 94 MG/DL — SIGNIFICANT CHANGE UP (ref 70–99)
GLUCOSE SERPL-MCNC: 100 MG/DL — HIGH (ref 70–99)
GLUCOSE UR QL: NEGATIVE — SIGNIFICANT CHANGE UP
HCO3 BLDV-SCNC: 30 MMOL/L — HIGH (ref 21–29)
HCT VFR BLD CALC: 51 % — HIGH (ref 39–50)
HCT VFR BLDA CALC: 55 % — HIGH (ref 39–50)
HGB BLD CALC-MCNC: 17.9 G/DL — HIGH (ref 13–17)
HGB BLD-MCNC: 17.8 G/DL — HIGH (ref 13–17)
HYALINE CASTS # UR AUTO: 6 /LPF — HIGH (ref 0–2)
KETONES UR-MCNC: ABNORMAL
LACTATE BLDV-MCNC: 2.1 MMOL/L — HIGH (ref 0.7–2)
LEUKOCYTE ESTERASE UR-ACNC: NEGATIVE — SIGNIFICANT CHANGE UP
LIDOCAIN IGE QN: 30 U/L — SIGNIFICANT CHANGE UP (ref 7–60)
LYMPHOCYTES # BLD AUTO: 0.84 K/UL — LOW (ref 1–3.3)
LYMPHOCYTES # BLD AUTO: 22 % — SIGNIFICANT CHANGE UP (ref 13–44)
MANUAL SMEAR VERIFICATION: SIGNIFICANT CHANGE UP
MCHC RBC-ENTMCNC: 33 PG — SIGNIFICANT CHANGE UP (ref 27–34)
MCHC RBC-ENTMCNC: 34.9 GM/DL — SIGNIFICANT CHANGE UP (ref 32–36)
MCV RBC AUTO: 94.6 FL — SIGNIFICANT CHANGE UP (ref 80–100)
MONOCYTES # BLD AUTO: 0.34 K/UL — SIGNIFICANT CHANGE UP (ref 0–0.9)
MONOCYTES NFR BLD AUTO: 9 % — SIGNIFICANT CHANGE UP (ref 2–14)
NEUTROPHILS # BLD AUTO: 2.44 K/UL — SIGNIFICANT CHANGE UP (ref 1.8–7.4)
NEUTROPHILS NFR BLD AUTO: 63 % — SIGNIFICANT CHANGE UP (ref 43–77)
NEUTS BAND # BLD: 1 % — SIGNIFICANT CHANGE UP (ref 0–8)
NITRITE UR-MCNC: NEGATIVE — SIGNIFICANT CHANGE UP
NRBC # BLD: 0 /100 — SIGNIFICANT CHANGE UP (ref 0–0)
PCO2 BLDV: 41 MMHG — SIGNIFICANT CHANGE UP (ref 35–50)
PH BLDV: 7.48 — HIGH (ref 7.35–7.45)
PH UR: 6.5 — SIGNIFICANT CHANGE UP (ref 5–8)
PLAT MORPH BLD: NORMAL — SIGNIFICANT CHANGE UP
PLATELET # BLD AUTO: 155 K/UL — SIGNIFICANT CHANGE UP (ref 150–400)
PO2 BLDV: 57 MMHG — HIGH (ref 25–45)
POTASSIUM BLDV-SCNC: 4.9 MMOL/L — SIGNIFICANT CHANGE UP (ref 3.5–5.3)
POTASSIUM SERPL-MCNC: 3.8 MMOL/L — SIGNIFICANT CHANGE UP (ref 3.5–5.3)
POTASSIUM SERPL-SCNC: 3.8 MMOL/L — SIGNIFICANT CHANGE UP (ref 3.5–5.3)
PROT SERPL-MCNC: 8.2 G/DL — SIGNIFICANT CHANGE UP (ref 6–8.3)
PROT UR-MCNC: ABNORMAL
RBC # BLD: 5.39 M/UL — SIGNIFICANT CHANGE UP (ref 4.2–5.8)
RBC # FLD: 11.8 % — SIGNIFICANT CHANGE UP (ref 10.3–14.5)
RBC BLD AUTO: SIGNIFICANT CHANGE UP
RBC CASTS # UR COMP ASSIST: 5 /HPF — HIGH (ref 0–4)
SAO2 % BLDV: 89 % — HIGH (ref 67–88)
SODIUM SERPL-SCNC: 136 MMOL/L — SIGNIFICANT CHANGE UP (ref 135–145)
SP GR SPEC: 1.03 — HIGH (ref 1.01–1.02)
TROPONIN T, HIGH SENSITIVITY RESULT: <6 NG/L — SIGNIFICANT CHANGE UP (ref 0–51)
UROBILINOGEN FLD QL: ABNORMAL
VARIANT LYMPHS # BLD: 4 % — SIGNIFICANT CHANGE UP (ref 0–6)
WBC # BLD: 5.1 K/UL — SIGNIFICANT CHANGE UP (ref 3.8–10.5)
WBC # FLD AUTO: 5.1 K/UL — SIGNIFICANT CHANGE UP (ref 3.8–10.5)
WBC UR QL: 3 /HPF — SIGNIFICANT CHANGE UP (ref 0–5)

## 2019-10-03 PROCEDURE — 71045 X-RAY EXAM CHEST 1 VIEW: CPT | Mod: 26

## 2019-10-03 PROCEDURE — 96375 TX/PRO/DX INJ NEW DRUG ADDON: CPT | Mod: XU

## 2019-10-03 PROCEDURE — 85610 PROTHROMBIN TIME: CPT

## 2019-10-03 PROCEDURE — 74174 CTA ABD&PLVS W/CONTRAST: CPT

## 2019-10-03 PROCEDURE — 96374 THER/PROPH/DIAG INJ IV PUSH: CPT | Mod: XU

## 2019-10-03 PROCEDURE — 93005 ELECTROCARDIOGRAM TRACING: CPT

## 2019-10-03 PROCEDURE — 85014 HEMATOCRIT: CPT

## 2019-10-03 PROCEDURE — 85730 THROMBOPLASTIN TIME PARTIAL: CPT

## 2019-10-03 PROCEDURE — 82330 ASSAY OF CALCIUM: CPT

## 2019-10-03 PROCEDURE — 71275 CT ANGIOGRAPHY CHEST: CPT | Mod: 26

## 2019-10-03 PROCEDURE — 74174 CTA ABD&PLVS W/CONTRAST: CPT | Mod: 26

## 2019-10-03 PROCEDURE — 87086 URINE CULTURE/COLONY COUNT: CPT

## 2019-10-03 PROCEDURE — 71275 CT ANGIOGRAPHY CHEST: CPT

## 2019-10-03 PROCEDURE — 83605 ASSAY OF LACTIC ACID: CPT

## 2019-10-03 PROCEDURE — 84295 ASSAY OF SERUM SODIUM: CPT

## 2019-10-03 PROCEDURE — 99285 EMERGENCY DEPT VISIT HI MDM: CPT

## 2019-10-03 PROCEDURE — 99284 EMERGENCY DEPT VISIT MOD MDM: CPT | Mod: 25

## 2019-10-03 PROCEDURE — 81001 URINALYSIS AUTO W/SCOPE: CPT

## 2019-10-03 PROCEDURE — 84484 ASSAY OF TROPONIN QUANT: CPT

## 2019-10-03 PROCEDURE — 82565 ASSAY OF CREATININE: CPT

## 2019-10-03 PROCEDURE — 84132 ASSAY OF SERUM POTASSIUM: CPT

## 2019-10-03 PROCEDURE — 80053 COMPREHEN METABOLIC PANEL: CPT

## 2019-10-03 PROCEDURE — 82435 ASSAY OF BLOOD CHLORIDE: CPT

## 2019-10-03 PROCEDURE — 82803 BLOOD GASES ANY COMBINATION: CPT

## 2019-10-03 PROCEDURE — 80307 DRUG TEST PRSMV CHEM ANLYZR: CPT

## 2019-10-03 PROCEDURE — 82947 ASSAY GLUCOSE BLOOD QUANT: CPT

## 2019-10-03 PROCEDURE — 83690 ASSAY OF LIPASE: CPT

## 2019-10-03 PROCEDURE — 71045 X-RAY EXAM CHEST 1 VIEW: CPT

## 2019-10-03 PROCEDURE — 93010 ELECTROCARDIOGRAM REPORT: CPT | Mod: NC

## 2019-10-03 PROCEDURE — 85027 COMPLETE CBC AUTOMATED: CPT

## 2019-10-03 RX ORDER — ACETAMINOPHEN 500 MG
975 TABLET ORAL ONCE
Refills: 0 | Status: COMPLETED | OUTPATIENT
Start: 2019-10-03 | End: 2019-10-03

## 2019-10-03 RX ORDER — ONDANSETRON 8 MG/1
4 TABLET, FILM COATED ORAL ONCE
Refills: 0 | Status: COMPLETED | OUTPATIENT
Start: 2019-10-03 | End: 2019-10-03

## 2019-10-03 RX ORDER — ONDANSETRON 8 MG/1
4 TABLET, FILM COATED ORAL ONCE
Refills: 0 | Status: DISCONTINUED | OUTPATIENT
Start: 2019-10-03 | End: 2019-10-03

## 2019-10-03 RX ORDER — LIDOCAINE 4 G/100G
1 CREAM TOPICAL ONCE
Refills: 0 | Status: COMPLETED | OUTPATIENT
Start: 2019-10-03 | End: 2019-10-03

## 2019-10-03 RX ORDER — SODIUM CHLORIDE 9 MG/ML
1000 INJECTION INTRAMUSCULAR; INTRAVENOUS; SUBCUTANEOUS ONCE
Refills: 0 | Status: COMPLETED | OUTPATIENT
Start: 2019-10-03 | End: 2019-10-03

## 2019-10-03 RX ORDER — KETOROLAC TROMETHAMINE 30 MG/ML
15 SYRINGE (ML) INJECTION ONCE
Refills: 0 | Status: DISCONTINUED | OUTPATIENT
Start: 2019-10-03 | End: 2019-10-03

## 2019-10-03 RX ORDER — HYDROMORPHONE HYDROCHLORIDE 2 MG/ML
0.5 INJECTION INTRAMUSCULAR; INTRAVENOUS; SUBCUTANEOUS ONCE
Refills: 0 | Status: DISCONTINUED | OUTPATIENT
Start: 2019-10-03 | End: 2019-10-03

## 2019-10-03 RX ADMIN — Medication 15 MILLIGRAM(S): at 21:19

## 2019-10-03 RX ADMIN — Medication 975 MILLIGRAM(S): at 21:18

## 2019-10-03 RX ADMIN — SODIUM CHLORIDE 1000 MILLILITER(S): 9 INJECTION INTRAMUSCULAR; INTRAVENOUS; SUBCUTANEOUS at 18:26

## 2019-10-03 RX ADMIN — HYDROMORPHONE HYDROCHLORIDE 0.5 MILLIGRAM(S): 2 INJECTION INTRAMUSCULAR; INTRAVENOUS; SUBCUTANEOUS at 18:21

## 2019-10-03 RX ADMIN — ONDANSETRON 4 MILLIGRAM(S): 8 TABLET, FILM COATED ORAL at 18:59

## 2019-10-03 RX ADMIN — LIDOCAINE 1 PATCH: 4 CREAM TOPICAL at 21:19

## 2019-10-03 RX ADMIN — Medication 1 MILLIGRAM(S): at 18:23

## 2019-10-03 NOTE — ED PROVIDER NOTE - CARE PROVIDER_API CALL
Jameel Choi)  Internal Medicine  [address] [place] [address]  Phone: [phone]  Fax: [phone]  Follow Up Time:

## 2019-10-03 NOTE — ED PROVIDER NOTE - NSFOLLOWUPCLINICS_GEN_ALL_ED_FT
[facility]  Orthopedic Surgery  [address], [address]rd & [address]th [place] [address]  Phone: [phone]  Fax:     [facility]  Urology  [address] - [address]rd [place] [address]  Phone: [phone]  Fax:   Follow Up Time:

## 2019-10-03 NOTE — ED PROVIDER NOTE - PROGRESS NOTE DETAILS
CTA negative for dissection. Pt primary on call  Dr Menard saw pt, advised likely [facility] back pain and advised sending home for followup if pt exam improved and test neg. Adam Singh PA-C

## 2019-10-03 NOTE — ED ADULT NURSE NOTE - NSIMPLEMENTINTERV_GEN_ALL_ED
Implemented All Fall Risk Interventions:  [place] to call system. Call bell, personal items and telephone within reach. Instruct patient to call for assistance. Room bathroom lighting operational. Non-slip footwear when patient is off stretcher. Physically safe environment: no spills, clutter or unnecessary equipment. Stretcher in lowest position, wheels locked, appropriate side rails in place. Provide visual cue, wrist band, yellow gown, etc. Monitor gait and stability. Monitor for mental status changes and reorient to person, place, and time. Review medications for side effects contributing to fall risk. Reinforce activity limits and safety measures with patient and family.

## 2019-10-03 NOTE — ED ADULT NURSE NOTE - OBJECTIVE STATEMENT
38 y/o male patient presents ambulatory to ED with complaint of intermittent bilateral flank pain x 4 days. Patient states last night the pain became persistent associated with nausea, vomiting and decreased PO intake. Per patient this afternoon, "loose bowel movement, very dark urine and numbness to BLE" - bringing him to the ED. Patient denies SOB and CP; afebrile in ED. PMHX alcohol abuse, pancreatitis - Patient states last drink was 4 days ago. 40 y/o male patient presents ambulatory to ED with complaint of intermittent bilateral flank pain x 4 days. Patient states last night the pain (worse on th left) became persistent associated with nausea, vomiting and decreased PO intake. Per patient this afternoon, "loose bowel movement, very dark urine and numbness to BLE - difficult to walk" - bringing him to the ED. Patient denies SOB and CP; afebrile in ED. PMHX alcohol abuse, pancreatitis - Patient states last drink was 4 days ago; marijuana use but denies cocaine, heroin and other drug use.

## 2019-10-03 NOTE — ED PROVIDER NOTE - NSFOLLOWUPINSTRUCTIONS_ED_ALL_ED_FT
1. Please follow up with your PCP in 1-2 days. Follow up with urology and orthopedic clinic within 1 week.  Your blood pressure was elevated today at your visit to the ED - please follow up with your PCP for this.     2. For continued pain, take 600 ibuprofen every 8 hours as needed with food. Purchase SalonPas at a pharmacy apply 12 hours on, 12 hours off.     3. Return to ED for worsening symptoms such as loss of consciousness, weakness, numbness, fecal or urinary incontinence, inability to walk, or any other concerning symptoms.

## 2019-10-03 NOTE — ED PROVIDER NOTE - OBJECTIVE STATEMENT
39 y male PMHx HTN, 5 episodes of etOH induced pancreatitis with ICU admission presenting with 2 days of severe sharp flank pain that began in his left flank and today started in his right flank. He also endorses hematuria/dysuria, endorses numbness in his feet for the past several hours and states "I cant walk." Pt also admits to multiple episodes of vomiting yesterday, no PO intake for 2-3 days, and whole-body redness, SOB, and some abdominal pain. He admits to heavy etOH use about 1pint/day, states his last drink was 2 days ago. Admits to marijuana use but denies cocaine, heroin, or other drug use. He denies chest pain, cough, hemoptysis, constipation, diarrhea, syncope, headache, vision changes, or trauma.

## 2019-10-03 NOTE — ED PROVIDER NOTE - SKIN TEMP
warm/COOL/pts feet feel cold. his thorax, head, neck and upper extremities are erythematous and warm with blanching.

## 2019-10-03 NOTE — ED PROVIDER NOTE - PATIENT PORTAL LINK FT
You can access the FollowMyHealth Patient Portal offered by [facility] by registering at the following website: [email or web address]. By joining [facility]’s FollowMyHealth portal, you will also be able to view your health information using other applications (apps) compatible with our system.

## 2019-10-03 NOTE — ED PROVIDER NOTE - ENMT, MLM
Pts face is flushed, diaphoretic. Airway patent, Nasal mucosa clear. Mouth with normal mucosa. Throat has no vesicles, no oropharyngeal exudates and uvula is midline.

## 2019-10-03 NOTE — ED PROVIDER NOTE - CLINICAL SUMMARY MEDICAL DECISION MAKING FREE TEXT BOX
SHAHID Manjarrez MD: Pt is a 38 y/o male with PMHx HTN, 5 episodes of etOH induced pancreatitis with ICU admission presenting with 2 days of severe sharp flank pain that began in his left flank and today started in his right flank. He also endorses hematuria/dysuria, endorses numbness in his feet for the past several hours and states "I cant walk." Pt also admits to multiple episodes of vomiting yesterday, no PO intake for 2-3 days, and whole-body redness, SOB, and some abdominal pain. He admits to heavy etOH use about 1pint/day, states his last drink was 2 days ago. Admits to marijuana use but denies cocaine, heroin, or other drug use. He denies chest pain, cough, hemoptysis, constipation, diarrhea, syncope, headache, vision changes, or trauma. Pt c/o some paresthesias to b/l feet. Ddx includes, however, is not limited to: MSK pain, aortic dissection, other acute pathology. Plan: CTA C/A/P, basic labs, trop, pain control, reassess

## 2019-10-03 NOTE — ED ADULT NURSE NOTE - ED STAT RN HANDOFF DETAILS
Bedside report given to on coming nurse Lissette. Understands pmh, medications given and plan of care for patient. Patient in stable condition, vital signs updated, has no complaints at this time and has been updated on care plan. Explained to patient that it is change of shift and new nurse is taking over, pt verbalized understanding.

## 2019-10-04 LAB
CULTURE RESULTS: NO GROWTH — SIGNIFICANT CHANGE UP
SPECIMEN SOURCE: SIGNIFICANT CHANGE UP

## 2019-10-09 LAB — DRUG SCREEN, SERUM: ABNORMAL

## 2019-11-02 ENCOUNTER — APPOINTMENT (OUTPATIENT)
Dept: ORTHOPEDIC SURGERY | Facility: CLINIC | Age: 39
End: 2019-11-02
Payer: COMMERCIAL

## 2019-11-02 VITALS — WEIGHT: 150 LBS | BODY MASS INDEX: 24.11 KG/M2 | HEIGHT: 66 IN

## 2019-11-02 PROCEDURE — 99214 OFFICE O/P EST MOD 30 MIN: CPT

## 2019-11-02 RX ORDER — METOPROLOL TARTRATE 25 MG/1
25 TABLET, FILM COATED ORAL
Qty: 60 | Refills: 0 | Status: ACTIVE | COMMUNITY
Start: 2019-01-23

## 2019-11-06 ENCOUNTER — APPOINTMENT (OUTPATIENT)
Dept: ORTHOPEDIC SURGERY | Facility: CLINIC | Age: 39
End: 2019-11-06
Payer: COMMERCIAL

## 2019-11-06 VITALS — HEIGHT: 66 IN | BODY MASS INDEX: 24.11 KG/M2 | WEIGHT: 150 LBS

## 2019-11-06 PROCEDURE — 99214 OFFICE O/P EST MOD 30 MIN: CPT

## 2019-12-04 ENCOUNTER — APPOINTMENT (OUTPATIENT)
Dept: ORTHOPEDIC SURGERY | Facility: CLINIC | Age: 39
End: 2019-12-04
Payer: COMMERCIAL

## 2019-12-04 VITALS — DIASTOLIC BLOOD PRESSURE: 106 MMHG | SYSTOLIC BLOOD PRESSURE: 150 MMHG | HEART RATE: 77 BPM

## 2019-12-04 PROCEDURE — 99213 OFFICE O/P EST LOW 20 MIN: CPT

## 2020-01-06 ENCOUNTER — APPOINTMENT (OUTPATIENT)
Dept: ORTHOPEDIC SURGERY | Facility: CLINIC | Age: 40
End: 2020-01-06
Payer: COMMERCIAL

## 2020-01-06 VITALS
DIASTOLIC BLOOD PRESSURE: 94 MMHG | HEART RATE: 73 BPM | HEIGHT: 66 IN | BODY MASS INDEX: 24.75 KG/M2 | SYSTOLIC BLOOD PRESSURE: 162 MMHG | WEIGHT: 154 LBS

## 2020-01-06 DIAGNOSIS — S12.591A: ICD-10-CM

## 2020-01-06 DIAGNOSIS — M51.26 OTHER INTERVERTEBRAL DISC DISPLACEMENT, LUMBAR REGION: ICD-10-CM

## 2020-01-06 DIAGNOSIS — Z86.79 PERSONAL HISTORY OF OTHER DISEASES OF THE CIRCULATORY SYSTEM: ICD-10-CM

## 2020-01-06 DIAGNOSIS — Z80.8 FAMILY HISTORY OF MALIGNANT NEOPLASM OF OTHER ORGANS OR SYSTEMS: ICD-10-CM

## 2020-01-06 DIAGNOSIS — Z80.0 FAMILY HISTORY OF MALIGNANT NEOPLASM OF DIGESTIVE ORGANS: ICD-10-CM

## 2020-01-06 DIAGNOSIS — Z78.9 OTHER SPECIFIED HEALTH STATUS: ICD-10-CM

## 2020-01-06 DIAGNOSIS — Z86.718 PERSONAL HISTORY OF OTHER VENOUS THROMBOSIS AND EMBOLISM: ICD-10-CM

## 2020-01-06 PROCEDURE — 99213 OFFICE O/P EST LOW 20 MIN: CPT

## 2020-01-06 RX ORDER — GABAPENTIN 100 MG/1
CAPSULE ORAL
Refills: 0 | Status: ACTIVE | COMMUNITY

## 2020-01-06 RX ORDER — IBUPROFEN 600 MG/1
600 TABLET, FILM COATED ORAL
Qty: 120 | Refills: 0 | Status: DISCONTINUED | COMMUNITY
Start: 2019-10-11 | End: 2020-01-06

## 2020-01-06 RX ORDER — METHYLPREDNISOLONE 4 MG/1
4 TABLET ORAL
Qty: 1 | Refills: 0 | Status: DISCONTINUED | COMMUNITY
Start: 2019-11-02 | End: 2020-01-06

## 2020-01-07 ENCOUNTER — OUTPATIENT (OUTPATIENT)
Dept: OUTPATIENT SERVICES | Facility: HOSPITAL | Age: 40
LOS: 1 days | End: 2020-01-07
Payer: COMMERCIAL

## 2020-01-07 VITALS
WEIGHT: 154.32 LBS | RESPIRATION RATE: 15 BRPM | DIASTOLIC BLOOD PRESSURE: 92 MMHG | TEMPERATURE: 98 F | SYSTOLIC BLOOD PRESSURE: 154 MMHG | HEART RATE: 63 BPM | HEIGHT: 67 IN | OXYGEN SATURATION: 100 %

## 2020-01-07 DIAGNOSIS — M53.82 OTHER SPECIFIED DORSOPATHIES, CERVICAL REGION: ICD-10-CM

## 2020-01-07 DIAGNOSIS — Z87.09 PERSONAL HISTORY OF OTHER DISEASES OF THE RESPIRATORY SYSTEM: Chronic | ICD-10-CM

## 2020-01-07 DIAGNOSIS — Z01.818 ENCOUNTER FOR OTHER PREPROCEDURAL EXAMINATION: ICD-10-CM

## 2020-01-07 DIAGNOSIS — Z98.890 OTHER SPECIFIED POSTPROCEDURAL STATES: Chronic | ICD-10-CM

## 2020-01-07 DIAGNOSIS — M54.16 RADICULOPATHY, LUMBAR REGION: ICD-10-CM

## 2020-01-07 DIAGNOSIS — F10.10 ALCOHOL ABUSE, UNCOMPLICATED: ICD-10-CM

## 2020-01-07 DIAGNOSIS — F12.90 CANNABIS USE, UNSPECIFIED, UNCOMPLICATED: ICD-10-CM

## 2020-01-07 DIAGNOSIS — M51.26 OTHER INTERVERTEBRAL DISC DISPLACEMENT, LUMBAR REGION: ICD-10-CM

## 2020-01-07 LAB
ALBUMIN SERPL ELPH-MCNC: 5.1 G/DL — HIGH (ref 3.3–5)
ALP SERPL-CCNC: 32 U/L — SIGNIFICANT CHANGE UP (ref 30–120)
ALT FLD-CCNC: 61 U/L DA — HIGH (ref 10–60)
ANION GAP SERPL CALC-SCNC: 8 MMOL/L — SIGNIFICANT CHANGE UP (ref 5–17)
APTT BLD: 32.6 SEC — SIGNIFICANT CHANGE UP (ref 28.5–37)
AST SERPL-CCNC: 36 U/L — SIGNIFICANT CHANGE UP (ref 10–40)
BILIRUB SERPL-MCNC: 0.7 MG/DL — SIGNIFICANT CHANGE UP (ref 0.2–1.2)
BUN SERPL-MCNC: 11 MG/DL — SIGNIFICANT CHANGE UP (ref 7–23)
CALCIUM SERPL-MCNC: 10 MG/DL — SIGNIFICANT CHANGE UP (ref 8.4–10.5)
CHLORIDE SERPL-SCNC: 100 MMOL/L — SIGNIFICANT CHANGE UP (ref 96–108)
CO2 SERPL-SCNC: 32 MMOL/L — HIGH (ref 22–31)
CREAT SERPL-MCNC: 0.63 MG/DL — SIGNIFICANT CHANGE UP (ref 0.5–1.3)
GLUCOSE SERPL-MCNC: 99 MG/DL — SIGNIFICANT CHANGE UP (ref 70–99)
HCT VFR BLD CALC: 45 % — SIGNIFICANT CHANGE UP (ref 39–50)
HGB BLD-MCNC: 16.1 G/DL — SIGNIFICANT CHANGE UP (ref 13–17)
INR BLD: 1.19 RATIO — HIGH (ref 0.88–1.16)
MCHC RBC-ENTMCNC: 33.9 PG — SIGNIFICANT CHANGE UP (ref 27–34)
MCHC RBC-ENTMCNC: 35.8 GM/DL — SIGNIFICANT CHANGE UP (ref 32–36)
MCV RBC AUTO: 94.7 FL — SIGNIFICANT CHANGE UP (ref 80–100)
NRBC # BLD: 0 /100 WBCS — SIGNIFICANT CHANGE UP (ref 0–0)
PLATELET # BLD AUTO: 148 K/UL — LOW (ref 150–400)
POTASSIUM SERPL-MCNC: 3.9 MMOL/L — SIGNIFICANT CHANGE UP (ref 3.5–5.3)
POTASSIUM SERPL-SCNC: 3.9 MMOL/L — SIGNIFICANT CHANGE UP (ref 3.5–5.3)
PROT SERPL-MCNC: 8.5 G/DL — HIGH (ref 6–8.3)
PROTHROM AB SERPL-ACNC: 13 SEC — HIGH (ref 10–12.9)
RBC # BLD: 4.75 M/UL — SIGNIFICANT CHANGE UP (ref 4.2–5.8)
RBC # FLD: 11.5 % — SIGNIFICANT CHANGE UP (ref 10.3–14.5)
SODIUM SERPL-SCNC: 140 MMOL/L — SIGNIFICANT CHANGE UP (ref 135–145)
WBC # BLD: 3.49 K/UL — LOW (ref 3.8–10.5)
WBC # FLD AUTO: 3.49 K/UL — LOW (ref 3.8–10.5)

## 2020-01-07 PROCEDURE — G0463: CPT

## 2020-01-07 PROCEDURE — 93005 ELECTROCARDIOGRAM TRACING: CPT

## 2020-01-07 PROCEDURE — 93010 ELECTROCARDIOGRAM REPORT: CPT

## 2020-01-07 NOTE — H&P PST ADULT - VENOUS THROMBOEMBOLISM HISTORY
(3) history of Deep Vein Thrombosis (DVT) or Pulmonary Embolism (PE)/(3) family history of thrombosis

## 2020-01-07 NOTE — H&P PST ADULT - HISTORY OF PRESENT ILLNESS
this is a 38 y/o male who woke up with severe pain 10/2/19; tests showed that a piece of disc broke off and is pressing on nerve; tried epidural and meds without relief; to have surgery to removed piece of disc; no pain meds currently

## 2020-01-07 NOTE — H&P PST ADULT - VENOUS THROMBOEMBOLISM CURRENT STATUS
(0) indicator not present/(1) other risk factor (includes escalating BMI, pack-years of smoking, diabetes requiring insulin, chemotherapy, female gender and length of surgery)
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
## 2020-01-07 NOTE — H&P PST ADULT - NSICDXPROBLEM_GEN_ALL_CORE_FT
PROBLEM DIAGNOSES  Problem: Decreased ROM of intervertebral discs of cervical spine  Assessment and Plan: anesthesia to be aware    Problem: Marijuana use  Assessment and Plan: chronic; anesthesia to be aware    Problem: Alcohol abuse  Assessment and Plan: medical clearance needed; will notify anesthesia and surgeon's office; message left for     Problem: Left lumbar radiculopathy  Assessment and Plan: microdiscectomy L4-5 left and any other indicated procedures; preop instructions given; needs medical clearance

## 2020-01-07 NOTE — H&P PST ADULT - NSICDXFAMILYHX_GEN_ALL_CORE_FT
FAMILY HISTORY:  Family history of alcoholism, father  Family history of cancer of mouth, father alive  Family history of hypertension  FH: melanoma, mother, also has HTN    Father  Still living? Unknown  Family history of lymphoma, Age at diagnosis: Age Unknown
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
## 2020-01-10 PROBLEM — Z86.718 PERSONAL HISTORY OF OTHER VENOUS THROMBOSIS AND EMBOLISM: Chronic | Status: ACTIVE | Noted: 2020-01-07

## 2020-01-10 PROBLEM — S12.9XXA FRACTURE OF NECK, UNSPECIFIED, INITIAL ENCOUNTER: Chronic | Status: ACTIVE | Noted: 2020-01-07
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
## 2020-01-10 NOTE — PROVIDER CONTACT NOTE (OTHER) - ASSESSMENT
The Spine Pre-Operative Education packet was given to the patient on 12/04/2019. The patient and I reviewed the information included in the packet. All his questions were answered and he gave a clear understanding of the instructions that were provided. He was advised to call the office at any time with further questions or concerns.

## 2020-01-13 ENCOUNTER — TRANSCRIPTION ENCOUNTER (OUTPATIENT)
Age: 40
End: 2020-01-13
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
## 2020-01-14 ENCOUNTER — APPOINTMENT (OUTPATIENT)
Dept: ORTHOPEDIC SURGERY | Facility: HOSPITAL | Age: 40
End: 2020-01-14

## 2020-01-14 ENCOUNTER — OUTPATIENT (OUTPATIENT)
Dept: OUTPATIENT SERVICES | Facility: HOSPITAL | Age: 40
LOS: 1 days | Discharge: HOME | End: 2020-01-14
Payer: COMMERCIAL

## 2020-01-14 ENCOUNTER — RESULT REVIEW (OUTPATIENT)
Age: 40
End: 2020-01-14

## 2020-01-14 VITALS
HEART RATE: 81 BPM | SYSTOLIC BLOOD PRESSURE: 143 MMHG | OXYGEN SATURATION: 100 % | DIASTOLIC BLOOD PRESSURE: 83 MMHG | RESPIRATION RATE: 17 BRPM

## 2020-01-14 VITALS
WEIGHT: 152.12 LBS | HEART RATE: 73 BPM | OXYGEN SATURATION: 100 % | HEIGHT: 67 IN | TEMPERATURE: 99 F | RESPIRATION RATE: 11 BRPM | DIASTOLIC BLOOD PRESSURE: 97 MMHG | SYSTOLIC BLOOD PRESSURE: 156 MMHG

## 2020-01-14 DIAGNOSIS — M51.26 OTHER INTERVERTEBRAL DISC DISPLACEMENT, LUMBAR REGION: ICD-10-CM

## 2020-01-14 DIAGNOSIS — Z98.890 OTHER SPECIFIED POSTPROCEDURAL STATES: Chronic | ICD-10-CM

## 2020-01-14 DIAGNOSIS — Z01.818 ENCOUNTER FOR OTHER PREPROCEDURAL EXAMINATION: ICD-10-CM

## 2020-01-14 DIAGNOSIS — Z87.09 PERSONAL HISTORY OF OTHER DISEASES OF THE RESPIRATORY SYSTEM: Chronic | ICD-10-CM

## 2020-01-14 LAB — ABO RH CONFIRMATION: SIGNIFICANT CHANGE UP

## 2020-01-14 PROCEDURE — 63030 LAMOT DCMPRN NRV RT 1 LMBR: CPT | Mod: 82,LT

## 2020-01-14 PROCEDURE — 63030 LAMOT DCMPRN NRV RT 1 LMBR: CPT | Mod: LT

## 2020-01-14 PROCEDURE — 63030 LAMOT DCMPRN NRV RT 1 LMBR: CPT

## 2020-01-14 PROCEDURE — 88304 TISSUE EXAM BY PATHOLOGIST: CPT

## 2020-01-14 PROCEDURE — C1889: CPT

## 2020-01-14 PROCEDURE — 76000 FLUOROSCOPY <1 HR PHYS/QHP: CPT

## 2020-01-14 PROCEDURE — 97161 PT EVAL LOW COMPLEX 20 MIN: CPT

## 2020-01-14 PROCEDURE — 88304 TISSUE EXAM BY PATHOLOGIST: CPT | Mod: 26

## 2020-01-14 RX ORDER — APREPITANT 80 MG/1
40 CAPSULE ORAL ONCE
Refills: 0 | Status: COMPLETED | OUTPATIENT
Start: 2020-01-14 | End: 2020-01-14

## 2020-01-14 RX ORDER — HYDROMORPHONE HYDROCHLORIDE 2 MG/ML
1 INJECTION INTRAMUSCULAR; INTRAVENOUS; SUBCUTANEOUS
Qty: 30 | Refills: 0
Start: 2020-01-14

## 2020-01-14 RX ORDER — HYDROMORPHONE HYDROCHLORIDE 2 MG/ML
1 INJECTION INTRAMUSCULAR; INTRAVENOUS; SUBCUTANEOUS
Refills: 0 | Status: DISCONTINUED | OUTPATIENT
Start: 2020-01-14 | End: 2020-01-14

## 2020-01-14 RX ORDER — SODIUM CHLORIDE 9 MG/ML
1000 INJECTION, SOLUTION INTRAVENOUS
Refills: 0 | Status: DISCONTINUED | OUTPATIENT
Start: 2020-01-14 | End: 2020-01-14

## 2020-01-14 RX ORDER — OXYCODONE AND ACETAMINOPHEN 5; 325 MG/1; MG/1
1 TABLET ORAL ONCE
Refills: 0 | Status: DISCONTINUED | OUTPATIENT
Start: 2020-01-14 | End: 2020-01-14

## 2020-01-14 RX ORDER — GABAPENTIN 400 MG/1
1 CAPSULE ORAL
Qty: 0 | Refills: 0 | DISCHARGE

## 2020-01-14 RX ORDER — ONDANSETRON 8 MG/1
4 TABLET, FILM COATED ORAL ONCE
Refills: 0 | Status: DISCONTINUED | OUTPATIENT
Start: 2020-01-14 | End: 2020-01-14

## 2020-01-14 RX ORDER — DIAZEPAM 5 MG
1 TABLET ORAL
Qty: 10 | Refills: 0
Start: 2020-01-14

## 2020-01-14 RX ORDER — CEFAZOLIN SODIUM 1 G
2000 VIAL (EA) INJECTION ONCE
Refills: 0 | Status: COMPLETED | OUTPATIENT
Start: 2020-01-14 | End: 2020-01-14

## 2020-01-14 RX ORDER — HYDROMORPHONE HYDROCHLORIDE 2 MG/ML
0.5 INJECTION INTRAMUSCULAR; INTRAVENOUS; SUBCUTANEOUS
Refills: 0 | Status: DISCONTINUED | OUTPATIENT
Start: 2020-01-14 | End: 2020-01-14

## 2020-01-14 RX ADMIN — HYDROMORPHONE HYDROCHLORIDE 0.5 MILLIGRAM(S): 2 INJECTION INTRAMUSCULAR; INTRAVENOUS; SUBCUTANEOUS at 10:45

## 2020-01-14 RX ADMIN — HYDROMORPHONE HYDROCHLORIDE 0.5 MILLIGRAM(S): 2 INJECTION INTRAMUSCULAR; INTRAVENOUS; SUBCUTANEOUS at 11:18

## 2020-01-14 RX ADMIN — OXYCODONE AND ACETAMINOPHEN 1 TABLET(S): 5; 325 TABLET ORAL at 12:15

## 2020-01-14 RX ADMIN — HYDROMORPHONE HYDROCHLORIDE 0.5 MILLIGRAM(S): 2 INJECTION INTRAMUSCULAR; INTRAVENOUS; SUBCUTANEOUS at 11:15

## 2020-01-14 RX ADMIN — OXYCODONE AND ACETAMINOPHEN 1 TABLET(S): 5; 325 TABLET ORAL at 11:30

## 2020-01-14 RX ADMIN — APREPITANT 40 MILLIGRAM(S): 80 CAPSULE ORAL at 07:30

## 2020-01-14 RX ADMIN — SODIUM CHLORIDE 75 MILLILITER(S): 9 INJECTION, SOLUTION INTRAVENOUS at 10:51

## 2020-01-14 RX ADMIN — HYDROMORPHONE HYDROCHLORIDE 0.5 MILLIGRAM(S): 2 INJECTION INTRAMUSCULAR; INTRAVENOUS; SUBCUTANEOUS at 11:00

## 2020-01-14 NOTE — PHYSICAL THERAPY INITIAL EVALUATION ADULT - WEIGHT-BEARING RESTRICTIONS: STAND/SIT, REHAB EVAL
Regarding: panic attack  ----- Message from Melissa CURTIS Kelly sent at 6/17/2019  5:05 PM CDT -----  Patient Name: Rocio Landa  Specialist or PCP:Dr Doty  Pregnant (If Yes, how long?):no  Symptoms:panic attack  Call Back #:[phone]  Is the patient’s permanent residence located in [place], [place], or a compact State? Yes [facility] [address]  Call Center Account #:[id number]         full weight-bearing

## 2020-01-14 NOTE — ASU DISCHARGE PLAN (ADULT/PEDIATRIC) - CARE PROVIDER_API CALL
Zak Puckett (MD)  Orthopaedic Surgery  [address], [address]  [place] [address]  Phone: [phone]  Fax: [phone]  Follow Up Time:

## 2020-01-14 NOTE — BRIEF OPERATIVE NOTE - NSICDXBRIEFPOSTOP_GEN_ALL_CORE_FT
POST-OP DIAGNOSIS:  HNP (herniated nucleus pulposus), lumbar 14-Jan-2020 10:23:16 L4-5 Left Javier Manning

## 2020-01-14 NOTE — ASU DISCHARGE PLAN (ADULT/PEDIATRIC) - CALL YOUR DOCTOR IF YOU HAVE ANY OF THE FOLLOWING:
Unable to urinate/Bleeding that does not stop/Wound/Surgical Site with redness, or foul smelling discharge or pus/Fever greater than (need to indicate Fahrenheit or Celsius)/Nausea and vomiting that does not stop

## 2020-01-14 NOTE — PHYSICAL THERAPY INITIAL EVALUATION ADULT - ADDITIONAL COMMENTS
Pt lives alone in a town house w/ 3 steps with no rail, 15 steps inside to bedroom to rail.  Pt reports his mother will be staying with him overnight

## 2020-01-14 NOTE — BRIEF OPERATIVE NOTE - NSICDXBRIEFPREOP_GEN_ALL_CORE_FT
PRE-OP DIAGNOSIS:  HNP (herniated nucleus pulposus), lumbar 14-Jan-2020 10:22:55 L4-5 Left Javier Manning

## 2020-01-21 RX ORDER — DIAZEPAM 2 MG/1
2 TABLET ORAL
Qty: 20 | Refills: 0 | Status: ACTIVE | COMMUNITY
Start: 2020-01-21 | End: 1900-01-01

## 2020-01-27 ENCOUNTER — APPOINTMENT (OUTPATIENT)
Dept: ORTHOPEDIC SURGERY | Facility: CLINIC | Age: 40
End: 2020-01-27

## 2020-01-29 ENCOUNTER — APPOINTMENT (OUTPATIENT)
Dept: ORTHOPEDIC SURGERY | Facility: CLINIC | Age: 40
End: 2020-01-29
Payer: COMMERCIAL

## 2020-01-29 VITALS
HEART RATE: 70 BPM | BODY MASS INDEX: 24.75 KG/M2 | HEIGHT: 66 IN | WEIGHT: 154 LBS | SYSTOLIC BLOOD PRESSURE: 160 MMHG | DIASTOLIC BLOOD PRESSURE: 117 MMHG

## 2020-01-29 DIAGNOSIS — Z00.00 ENCOUNTER FOR GENERAL ADULT MEDICAL EXAMINATION W/OUT ABNORMAL FINDINGS: ICD-10-CM

## 2020-01-29 PROCEDURE — 99024 POSTOP FOLLOW-UP VISIT: CPT

## 2020-01-29 PROCEDURE — 72100 X-RAY EXAM L-S SPINE 2/3 VWS: CPT

## 2020-01-29 RX ORDER — METHYLPREDNISOLONE 4 MG/1
4 TABLET ORAL
Qty: 1 | Refills: 0 | Status: ACTIVE | COMMUNITY
Start: 2020-01-29 | End: 1900-01-01

## 2020-02-01 NOTE — ED ADULT TRIAGE NOTE - BMI (KG/M2)
Addended by: Edu Brownlee on: 2/1/2020 10:46 AM     Modules accepted: Maria Guadalupe
Addended by: Ryan Mast on: 2/1/2020 12:02 PM     Modules accepted: Orders
25.1

## 2020-02-26 ENCOUNTER — APPOINTMENT (OUTPATIENT)
Dept: ORTHOPEDIC SURGERY | Facility: CLINIC | Age: 40
End: 2020-02-26
Payer: COMMERCIAL

## 2020-02-26 VITALS — BODY MASS INDEX: 24.75 KG/M2 | WEIGHT: 154 LBS | HEIGHT: 66 IN

## 2020-02-26 PROCEDURE — 99024 POSTOP FOLLOW-UP VISIT: CPT

## 2020-03-18 ENCOUNTER — APPOINTMENT (OUTPATIENT)
Dept: ORTHOPEDIC SURGERY | Facility: CLINIC | Age: 40
End: 2020-03-18

## 2020-04-20 ENCOUNTER — RX RENEWAL (OUTPATIENT)
Age: 40
End: 2020-04-20

## 2020-04-20 RX ORDER — TIZANIDINE 4 MG/1
4 TABLET ORAL
Qty: 60 | Refills: 2 | Status: ACTIVE | COMMUNITY
Start: 2020-01-29 | End: 1900-01-01

## 2020-05-28 ENCOUNTER — APPOINTMENT (OUTPATIENT)
Dept: ORTHOPEDIC SURGERY | Facility: CLINIC | Age: 40
End: 2020-05-28
Payer: COMMERCIAL

## 2020-05-28 VITALS — TEMPERATURE: 98.3 F

## 2020-05-28 PROCEDURE — 99213 OFFICE O/P EST LOW 20 MIN: CPT

## 2020-11-04 ENCOUNTER — APPOINTMENT (OUTPATIENT)
Dept: ORTHOPEDIC SURGERY | Facility: CLINIC | Age: 40
End: 2020-11-04
Payer: COMMERCIAL

## 2020-11-04 VITALS — WEIGHT: 54 LBS | HEIGHT: 66 IN | TEMPERATURE: 97.2 F | BODY MASS INDEX: 8.68 KG/M2

## 2020-11-04 PROCEDURE — 99213 OFFICE O/P EST LOW 20 MIN: CPT

## 2020-11-04 PROCEDURE — 99072 ADDL SUPL MATRL&STAF TM PHE: CPT

## 2020-11-04 RX ORDER — TIZANIDINE 4 MG/1
4 TABLET ORAL
Qty: 30 | Refills: 3 | Status: ACTIVE | COMMUNITY
Start: 2020-11-04 | End: 1900-01-01

## 2021-01-01 NOTE — PROGRESS NOTE BEHAVIORAL HEALTH - ORIENTATION OTHER
Sedated, on Precedex

Sedated


ABR (auditory brainstem response)
Sedated s/p Haldol PRN
Sedated
could give his name, month, year, and knows he is in a hospital
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
## 2021-03-19 NOTE — BEHAVIORAL HEALTH ASSESSMENT NOTE - NS ED BHA MSE SPEECH RATE
- patient is a 12-year-old metastatic prostate cancer status post radiation recurrence managed by Dr Radha Mcneill  Patient was on Lupron and his last Lupron shot was on 05/01/2020  Patient was electing to intermittent ADT due to sexual function concerns  -  Patient has been followed up closely with PSA  Was 0 7  Most recent was 2 3  Given interval change would recommend starting an additional dose of Lupron  -  Patient would like to postpone on from until after he has completed the COVID and 19 vaccination  This was discussed with Dr Radha Mcneill and he is okay with patient completing  COVID-19  Vaccinations  -  Patient will return after completion of vaccination, for  Lupron and PSA prior to visit  -  ZOILA performed today showed approximately 35 g prostate smooth with a palpable nodule on the left  Good rectal sphincter tone, no bogginess, firm  Normal

## 2021-04-20 NOTE — PROGRESS NOTE ADULT - NEGATIVE SKIN SYMPTOMS
no dryness/no tumor/no brittle nails/no hair loss/no rash/no itching/no change in size/color of mole/no pitted nails [FreeTextEntry1] : Vaping /nicotine  addiction/ post Covid April by hx\par clinically improved from pulmonary perspective, but bothered by fatigue\par Seeing multiple new specialist, neurology, dermatology and GI\par Decreased EF 45%( new) Cardiology following, negative cath 2018\par CT 10/20: upper lobe GG infiltrates, needs repeat CT scan\par Discussed with pt, differential includes post Covid residual, vape associated , RB-ILD associated to nicotine inhalation, \par PFTs are normal, prn Proair for hx asthma\par Vaping cessation discussed in detail\par repeat CT scan\par Cardiology follow up\par 6 months or sooner if needed, will call with results\par

## 2021-10-05 NOTE — ED ADULT TRIAGE NOTE - SPO2 (%)
Current Issues/Problems reviewed on call:  spoke with patient. Patient reports he has not found a therapist yet, and would like for SW to send additional list. SW sent list through email. SW also sent agencies that assist with counseling needs.     Call completed with: Srikanth  Social Determinants of Health Identified: Coping/Stress    Community Resources Needed? Yes   If Yes, what resources were provided?    Time Frame for Follow up if needed: within 2-3 weeks    SW Goal reviewed with patient. Patient agrees with the SW plan of care and call follow-up plan.        98

## 2021-10-10 NOTE — H&P PST ADULT - [facility] PE MLT D E PC
Right knee pain since Thursday, pain on steps, requesting glucose to be checked
no murmur/regular rate and rhythm

## 2021-10-20 ENCOUNTER — APPOINTMENT (OUTPATIENT)
Dept: INTERNAL MEDICINE | Facility: CLINIC | Age: 41
End: 2021-10-20

## 2021-12-22 ENCOUNTER — NON-APPOINTMENT (OUTPATIENT)
Age: 41
End: 2021-12-22

## 2021-12-22 ENCOUNTER — APPOINTMENT (OUTPATIENT)
Dept: ORTHOPEDIC SURGERY | Facility: CLINIC | Age: 41
End: 2021-12-22
Payer: MEDICAID

## 2021-12-22 VITALS — DIASTOLIC BLOOD PRESSURE: 90 MMHG | HEART RATE: 71 BPM | SYSTOLIC BLOOD PRESSURE: 138 MMHG

## 2021-12-22 DIAGNOSIS — M47.812 SPONDYLOSIS W/OUT MYELOPATHY OR RADICULOPATHY, CERVICAL REGION: ICD-10-CM

## 2021-12-22 PROCEDURE — 99214 OFFICE O/P EST MOD 30 MIN: CPT

## 2021-12-22 PROCEDURE — 72110 X-RAY EXAM L-2 SPINE 4/>VWS: CPT

## 2021-12-22 PROCEDURE — 72050 X-RAY EXAM NECK SPINE 4/5VWS: CPT

## 2021-12-22 NOTE — HISTORY OF PRESENT ILLNESS
[Pain Location] : pain [C-Spine] : C-spine region [Lumbar] : lumbar region [Stable] : stable [6] : a current pain level of 6/10 [de-identified] : Patient presents for follow-up for 2 issues.  Patient has been having trapezius pain bilaterally for 2 months following working out in the neck.  He describes a clicking in the tightness.  He has no radiculopathy.  Patient also has had some symptoms over the left SI joint for approximately 4 months in the lower back when exercising his lower extremities.  Patient is a  is not taking any medication it does not interfere with activities of daily living he presents for follow-up.  He has a history of laminectomy for herniated disc.

## 2021-12-22 NOTE — DISCUSSION/SUMMARY
[de-identified] : Patient is concerned about the occasional radiation of symptoms into the left thigh.  To rule out a recurrent disc herniation patient will require an MRI with gadolinium.  Neupro prescription was provided.  He will follow up with us in 1 to 2 weeks to review those results.

## 2021-12-22 NOTE — REASON FOR VISIT
[Follow-Up Visit] : a follow-up visit for [FreeTextEntry2] : S/P lumbar microdiscectomy 1/14/2020, Neck and lower back pain

## 2021-12-22 NOTE — PHYSICAL EXAM
[UE/LE] : Sensory: Intact in bilateral upper & lower extremities [Normal] : Oriented to person, place, and time, insight and judgement were intact and the affect was normal [de-identified] : Range of motion to the cervical spine is limited in all planes secondary to pain.  There is right and left trapezial tenderness.  Motor and sensation are grossly intact upper extremity testing.\par \par There is tenderness palpation of the SI joint.  Range of motion is full to the lumbar spine in all planes. 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  There appears to be a limited effort on flexion-extension.\par \par AP lateral and flexion-extension x-rays obtained of the lumbar spine show mild discogenic changes at L3-4.  There is no instability to flexion and extension.

## 2022-02-09 ENCOUNTER — APPOINTMENT (OUTPATIENT)
Dept: ORTHOPEDIC SURGERY | Facility: CLINIC | Age: 42
End: 2022-02-09
Payer: MEDICAID

## 2022-02-09 VITALS — DIASTOLIC BLOOD PRESSURE: 91 MMHG | HEART RATE: 77 BPM | SYSTOLIC BLOOD PRESSURE: 145 MMHG

## 2022-02-09 DIAGNOSIS — Z98.890 OTHER SPECIFIED POSTPROCEDURAL STATES: ICD-10-CM

## 2022-02-09 PROCEDURE — 99213 OFFICE O/P EST LOW 20 MIN: CPT
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
## 2023-01-16 NOTE — PROVIDER CONTACT NOTE (OTHER) - REASON
Increased CIWA score
[facility] 22
Pt not clearing thick secretions
Pt refusals, IV removal
RRT
Refusing Bladder Scan q6hrs
Stat Enoxaparin 70mg
phlebotomy unable to obtain  blood specimens on 8/30/18
ambulatory
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
## 2023-05-16 NOTE — H&P ADULT - NECK DETAILS
Comment: Interested in [address]  Recommended 4 treatments 1 month apart , then every 3 months for maintenance Render Risk Assessment In Note?: yes Detail Level: Detailed large scar over the right neck (throat slit), old tracheostomy scan

## 2023-06-16 NOTE — PHYSICAL THERAPY INITIAL EVALUATION ADULT - ADL SKILLS, REHAB EVAL
[de-identified] : General: No apparent distress\par Cardiovascular: extremities warm and well-perfused, no cyanosis\par Pulmonary: non labored respirations\par \par Left shoulder:\par No Tenderness to palpation about shoulder\par Active and passive range of motion: 140 forward flexion/80 external rotation/internal rotation to the thoracic spine\par Fires EPL/FPL/dorsal interossei/wrist extensors\par Sensation intact light touch in median/ulnar/radial/axillary distributions\par Radial pulse 2+ [de-identified] : 3 views of the left shoulder obtained today and interpreted by me demonstrate proximal humerus fracture in maintained alignment to previous x-rays with interval callus formation, and bony bridging independent

## 2023-11-29 NOTE — ED PROVIDER NOTE - NSTIMEPROVIDERCAREINITIATE_GEN_ER
Hide Include Location In Plan Question?: No
Detail Level: Generalized
Include Location In Plan?: Yes
03-Oct-2019 17:26
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
## 2024-12-19 ENCOUNTER — APPOINTMENT (OUTPATIENT)
Dept: GASTROENTEROLOGY | Facility: CLINIC | Age: 44
End: 2024-12-19
Payer: COMMERCIAL

## 2024-12-19 VITALS
OXYGEN SATURATION: 98 % | BODY MASS INDEX: 26.68 KG/M2 | HEART RATE: 78 BPM | RESPIRATION RATE: 12 BRPM | SYSTOLIC BLOOD PRESSURE: 134 MMHG | WEIGHT: 166 LBS | DIASTOLIC BLOOD PRESSURE: 80 MMHG | HEIGHT: 66 IN

## 2024-12-19 DIAGNOSIS — Z12.11 ENCOUNTER FOR SCREENING FOR MALIGNANT NEOPLASM OF COLON: ICD-10-CM

## 2024-12-19 DIAGNOSIS — K62.5 HEMORRHAGE OF ANUS AND RECTUM: ICD-10-CM

## 2024-12-19 DIAGNOSIS — K21.9 GASTRO-ESOPHAGEAL REFLUX DISEASE W/OUT ESOPHAGITIS: ICD-10-CM

## 2024-12-19 DIAGNOSIS — M47.812 SPONDYLOSIS W/OUT MYELOPATHY OR RADICULOPATHY, CERVICAL REGION: ICD-10-CM

## 2024-12-19 PROCEDURE — 99203 OFFICE O/P NEW LOW 30 MIN: CPT

## 2024-12-19 RX ORDER — ESOMEPRAZOLE MAGNESIUM 40 MG/1
40 CAPSULE, DELAYED RELEASE ORAL
Qty: 90 | Refills: 1 | Status: ACTIVE | COMMUNITY
Start: 2024-12-19 | End: 1900-01-01

## 2024-12-19 RX ORDER — SODIUM SULFATE, POTASSIUM SULFATE AND MAGNESIUM SULFATE 1.6; 3.13; 17.5 G/177ML; G/177ML; G/177ML
17.5-3.13-1.6 SOLUTION ORAL
Qty: 354 | Refills: 0 | Status: ACTIVE | COMMUNITY
Start: 2024-12-19 | End: 1900-01-01

## 2025-01-11 NOTE — PATIENT PROFILE ADULT. - HAS THE PATIENT HAD A SIGNIFICANT CHANGE IN FUNCTIONAL STATUS DUE TO CVA, HEAD TRAUMA, ORTHOPEDIC TRAUMA/SURGERY, OR FALL, WITH THE WEEK PRIOR TO ADMISSION
Problem: At Risk for Falls  Goal: Patient does not fall  Outcome: Monitoring/Evaluating progress     Problem: At Risk for Injury Due to Fall  Goal: Patient does not fall  Outcome: Monitoring/Evaluating progress     Problem: Alcohol Withdrawal Management  Goal: # No alcohol-related delirium or seizures  Outcome: Monitoring/Evaluating progress     
  Problem: At Risk for Falls  Goal: Patient does not fall  Outcome: Monitoring/Evaluating progress  Goal: Patient takes action to control fall-related risks  Outcome: Monitoring/Evaluating progress     Problem: At Risk for Injury Due to Fall  Goal: Patient does not fall  Outcome: Monitoring/Evaluating progress  Goal: Takes action to control condition specific risks  Outcome: Monitoring/Evaluating progress  Goal: Verbalizes understanding of fall-related injury personal risks  Description: Document education using the patient education activity  Outcome: Monitoring/Evaluating progress     Problem: Alcohol Withdrawal Management  Goal: # No alcohol-related delirium or seizures  Outcome: Monitoring/Evaluating progress  Goal: # Verbalizes understanding of alcohol withdrawal and health effects of excessive alcohol intake  Description: Documented on patient education activity  Outcome: Monitoring/Evaluating progress     
no

## 2025-01-29 ENCOUNTER — RESULT REVIEW (OUTPATIENT)
Age: 45
End: 2025-01-29

## 2025-01-29 ENCOUNTER — APPOINTMENT (OUTPATIENT)
Dept: GASTROENTEROLOGY | Facility: AMBULATORY SURGERY CENTER | Age: 45
End: 2025-01-29
Payer: MEDICAID

## 2025-01-29 PROCEDURE — 43239 EGD BIOPSY SINGLE/MULTIPLE: CPT

## 2025-01-29 PROCEDURE — 45380 COLONOSCOPY AND BIOPSY: CPT | Mod: 59

## 2025-01-29 PROCEDURE — 45385 COLONOSCOPY W/LESION REMOVAL: CPT

## 2025-01-30 ENCOUNTER — NON-APPOINTMENT (OUTPATIENT)
Age: 45
End: 2025-01-30

## 2025-01-30 DIAGNOSIS — D12.6 BENIGN NEOPLASM OF COLON, UNSPECIFIED: ICD-10-CM

## 2025-01-30 DIAGNOSIS — K29.70 GASTRITIS, UNSPECIFIED, W/OUT BLEEDING: ICD-10-CM

## 2025-01-30 DIAGNOSIS — B96.81 GASTRITIS, UNSPECIFIED, W/OUT BLEEDING: ICD-10-CM

## 2025-01-30 RX ORDER — OMEPRAZOLE 40 MG/1
40 CAPSULE, DELAYED RELEASE ORAL
Qty: 14 | Refills: 0 | Status: ACTIVE | COMMUNITY
Start: 2025-01-30 | End: 1900-01-01

## 2025-01-30 RX ORDER — AMOXICILLIN 500 MG/1
500 CAPSULE ORAL
Qty: 56 | Refills: 0 | Status: ACTIVE | COMMUNITY
Start: 2025-01-30 | End: 1900-01-01

## 2025-01-30 RX ORDER — CLARITHROMYCIN 500 MG/1
500 TABLET, FILM COATED ORAL
Qty: 28 | Refills: 0 | Status: ACTIVE | COMMUNITY
Start: 2025-01-30 | End: 1900-01-01

## 2025-04-02 ENCOUNTER — APPOINTMENT (OUTPATIENT)
Dept: GASTROENTEROLOGY | Facility: CLINIC | Age: 45
End: 2025-04-02
Payer: MEDICAID

## 2025-04-02 VITALS
HEART RATE: 90 BPM | WEIGHT: 165 LBS | SYSTOLIC BLOOD PRESSURE: 124 MMHG | OXYGEN SATURATION: 98 % | BODY MASS INDEX: 26.52 KG/M2 | DIASTOLIC BLOOD PRESSURE: 80 MMHG | HEIGHT: 66 IN | RESPIRATION RATE: 12 BRPM

## 2025-04-02 DIAGNOSIS — R11.2 NAUSEA WITH VOMITING, UNSPECIFIED: ICD-10-CM

## 2025-04-02 PROCEDURE — 99213 OFFICE O/P EST LOW 20 MIN: CPT

## 2025-04-02 PROCEDURE — 83014 H PYLORI DRUG ADMIN: CPT

## 2025-04-03 LAB — UREA BREATH TEST QL: NEGATIVE

## 2025-04-23 NOTE — H&P PST ADULT - BSA (M2)
"I have reviewed the surgical (or preoperative) H&P that is linked to this encounter, and examined the patient. There are no significant changes    Clinical Conditions Present on Arrival:  Clinically Significant Risk Factors Present on Admission                  # Drug Induced Platelet Defect: home medication list includes an antiplatelet medication      # Severe Obesity: Estimated body mass index is 40.99 kg/m  as calculated from the following:    Height as of 4/15/25: 1.626 m (5' 4\").    Weight as of 4/15/25: 108.3 kg (238 lb 12.8 oz).     Briana Jaimes MD  Ob/Gyn PGY-3  04/23/25 10:53 AM          "
1.81

## 2025-04-24 ENCOUNTER — OUTPATIENT (OUTPATIENT)
Dept: OUTPATIENT SERVICES | Facility: HOSPITAL | Age: 45
LOS: 1 days | End: 2025-04-24
Payer: MEDICAID

## 2025-04-24 ENCOUNTER — APPOINTMENT (OUTPATIENT)
Dept: ULTRASOUND IMAGING | Facility: CLINIC | Age: 45
End: 2025-04-24
Payer: MEDICAID

## 2025-04-24 DIAGNOSIS — Z98.890 OTHER SPECIFIED POSTPROCEDURAL STATES: Chronic | ICD-10-CM

## 2025-04-24 DIAGNOSIS — R11.2 NAUSEA WITH VOMITING, UNSPECIFIED: ICD-10-CM

## 2025-04-24 DIAGNOSIS — Z87.09 PERSONAL HISTORY OF OTHER DISEASES OF THE RESPIRATORY SYSTEM: Chronic | ICD-10-CM

## 2025-04-24 PROCEDURE — 76700 US EXAM ABDOM COMPLETE: CPT

## 2025-04-24 PROCEDURE — 76700 US EXAM ABDOM COMPLETE: CPT | Mod: 26

## 2025-06-16 ENCOUNTER — RX RENEWAL (OUTPATIENT)
Age: 45
End: 2025-06-16

## 2025-06-17 ENCOUNTER — APPOINTMENT (OUTPATIENT)
Dept: GASTROENTEROLOGY | Facility: CLINIC | Age: 45
End: 2025-06-17

## 2025-07-22 ENCOUNTER — NON-APPOINTMENT (OUTPATIENT)
Age: 45
End: 2025-07-22

## 2025-07-23 ENCOUNTER — APPOINTMENT (OUTPATIENT)
Dept: GASTROENTEROLOGY | Facility: CLINIC | Age: 45
End: 2025-07-23
Payer: MEDICAID

## 2025-07-23 VITALS
OXYGEN SATURATION: 98 % | SYSTOLIC BLOOD PRESSURE: 124 MMHG | RESPIRATION RATE: 12 BRPM | HEART RATE: 88 BPM | HEIGHT: 66 IN | DIASTOLIC BLOOD PRESSURE: 78 MMHG

## 2025-07-23 PROCEDURE — 99214 OFFICE O/P EST MOD 30 MIN: CPT

## 2025-07-23 RX ORDER — ONDANSETRON 8 MG/1
8 TABLET, ORALLY DISINTEGRATING ORAL
Qty: 90 | Refills: 2 | Status: ACTIVE | COMMUNITY
Start: 2025-07-23 | End: 1900-01-01